# Patient Record
Sex: MALE | Race: WHITE | Employment: OTHER | URBAN - METROPOLITAN AREA
[De-identification: names, ages, dates, MRNs, and addresses within clinical notes are randomized per-mention and may not be internally consistent; named-entity substitution may affect disease eponyms.]

---

## 2017-02-20 ENCOUNTER — OFFICE VISIT (OUTPATIENT)
Dept: FAMILY MEDICINE CLINIC | Age: 67
End: 2017-02-20

## 2017-02-20 VITALS
BODY MASS INDEX: 31.07 KG/M2 | HEIGHT: 72 IN | HEART RATE: 70 BPM | OXYGEN SATURATION: 98 % | DIASTOLIC BLOOD PRESSURE: 76 MMHG | RESPIRATION RATE: 12 BRPM | SYSTOLIC BLOOD PRESSURE: 122 MMHG | WEIGHT: 229.4 LBS

## 2017-02-20 DIAGNOSIS — Z00.00 WELL ADULT HEALTH CHECK: Primary | ICD-10-CM

## 2017-02-20 PROCEDURE — 99396 PREV VISIT EST AGE 40-64: CPT | Performed by: FAMILY MEDICINE

## 2017-02-20 RX ORDER — ALLOPURINOL 300 MG/1
TABLET ORAL
Qty: 90 TABLET | Refills: 3 | Status: SHIPPED | OUTPATIENT
Start: 2017-02-20 | End: 2018-01-22 | Stop reason: SDUPTHER

## 2017-02-20 RX ORDER — IRBESARTAN AND HYDROCHLOROTHIAZIDE 150; 12.5 MG/1; MG/1
1 TABLET, FILM COATED ORAL DAILY
Qty: 90 TABLET | Refills: 3 | Status: SHIPPED | OUTPATIENT
Start: 2017-02-20 | End: 2018-01-24 | Stop reason: SDUPTHER

## 2017-02-20 RX ORDER — METOPROLOL SUCCINATE 25 MG/1
TABLET, EXTENDED RELEASE ORAL
Qty: 90 TABLET | Refills: 3 | Status: SHIPPED | OUTPATIENT
Start: 2017-02-20 | End: 2018-01-22 | Stop reason: SDUPTHER

## 2017-02-20 RX ORDER — IRBESARTAN AND HYDROCHLOROTHIAZIDE 150; 12.5 MG/1; MG/1
1 TABLET, FILM COATED ORAL DAILY
Qty: 90 TABLET | Refills: 3 | Status: CANCELLED | OUTPATIENT
Start: 2017-02-20

## 2017-02-20 ASSESSMENT — ENCOUNTER SYMPTOMS
RESPIRATORY NEGATIVE: 1
GASTROINTESTINAL NEGATIVE: 1

## 2017-06-20 ENCOUNTER — OFFICE VISIT (OUTPATIENT)
Dept: FAMILY MEDICINE CLINIC | Age: 67
End: 2017-06-20

## 2017-06-20 VITALS
OXYGEN SATURATION: 96 % | HEART RATE: 87 BPM | HEIGHT: 72 IN | WEIGHT: 225 LBS | DIASTOLIC BLOOD PRESSURE: 80 MMHG | SYSTOLIC BLOOD PRESSURE: 136 MMHG | RESPIRATION RATE: 14 BRPM | BODY MASS INDEX: 30.48 KG/M2

## 2017-06-20 DIAGNOSIS — M54.12 CERVICAL RADICULITIS: Primary | ICD-10-CM

## 2017-06-20 PROCEDURE — G8417 CALC BMI ABV UP PARAM F/U: HCPCS | Performed by: FAMILY MEDICINE

## 2017-06-20 PROCEDURE — 1036F TOBACCO NON-USER: CPT | Performed by: FAMILY MEDICINE

## 2017-06-20 PROCEDURE — G8427 DOCREV CUR MEDS BY ELIG CLIN: HCPCS | Performed by: FAMILY MEDICINE

## 2017-06-20 PROCEDURE — 3017F COLORECTAL CA SCREEN DOC REV: CPT | Performed by: FAMILY MEDICINE

## 2017-06-20 PROCEDURE — 99213 OFFICE O/P EST LOW 20 MIN: CPT | Performed by: FAMILY MEDICINE

## 2017-06-20 RX ORDER — AMITRIPTYLINE HYDROCHLORIDE 50 MG/1
50 TABLET, FILM COATED ORAL NIGHTLY
Qty: 30 TABLET | Refills: 1 | Status: SHIPPED | OUTPATIENT
Start: 2017-06-20 | End: 2019-03-07

## 2017-06-20 RX ORDER — KETOROLAC TROMETHAMINE 10 MG/1
10 TABLET, FILM COATED ORAL EVERY 6 HOURS PRN
Qty: 20 TABLET | Refills: 0 | Status: SHIPPED | OUTPATIENT
Start: 2017-06-20 | End: 2018-02-01 | Stop reason: ALTCHOICE

## 2017-06-20 ASSESSMENT — PATIENT HEALTH QUESTIONNAIRE - PHQ9
1. LITTLE INTEREST OR PLEASURE IN DOING THINGS: 0
2. FEELING DOWN, DEPRESSED OR HOPELESS: 0
SUM OF ALL RESPONSES TO PHQ QUESTIONS 1-9: 0
SUM OF ALL RESPONSES TO PHQ9 QUESTIONS 1 & 2: 0

## 2017-06-20 ASSESSMENT — ENCOUNTER SYMPTOMS
GASTROINTESTINAL NEGATIVE: 1
RESPIRATORY NEGATIVE: 1

## 2017-12-21 LAB
ALBUMIN SERPL-MCNC: 4 G/DL (ref 3.2–5.3)
ALK PHOSPHATASE: 85 IU/L (ref 35–121)
ALT SERPL-CCNC: 31 IU/L (ref 5–59)
ANION GAP SERPL CALCULATED.3IONS-SCNC: 12 MMOL/L
AST SERPL-CCNC: 27 IU/L (ref 10–42)
BILIRUB SERPL-MCNC: 0.5 MG/DL (ref 0.2–1.3)
BUN BLDV-MCNC: 19 MG/DL (ref 10–20)
CALCIUM SERPL-MCNC: 9.6 MG/DL (ref 8.7–10.8)
CHLORIDE BLD-SCNC: 109 MMOL/L (ref 95–111)
CHOLESTEROL/HDL RATIO: 3.6
CHOLESTEROL: 164 MG/DL
CO2: 25 MMOL/L (ref 21–32)
CREAT SERPL-MCNC: 0.7 MG/DL (ref 0.5–1.3)
EGFR AFRICAN AMERICAN: 137
EGFR IF NONAFRICAN AMERICAN: 113
GLUCOSE: 108 MG/DL (ref 70–100)
HDLC SERPL-MCNC: 46 MG/DL (ref 40–60)
LDL CHOLESTEROL CALCULATED: 80 MG/DL
LDL/HDL RATIO: 1.7
POTASSIUM SERPL-SCNC: 4 MMOL/L (ref 3.5–5.4)
PSA, ULTRASENSITIVE: 0.45 NG/ML
SODIUM BLD-SCNC: 142 MMOL/L (ref 134–147)
TOTAL PROTEIN: 6.5 G/DL (ref 5.8–8)
TRIGL SERPL-MCNC: 192 MG/DL
TSH SERPL DL<=0.05 MIU/L-ACNC: 3.08 UIU/ML (ref 0.4–4.4)
URIC ACID: 4.7 MG/DL (ref 3.8–7.8)
VLDLC SERPL CALC-MCNC: 38 MG/DL

## 2017-12-22 LAB
AVERAGE GLUCOSE: 117 MG/DL (ref 66–114)
HBA1C MFR BLD: 5.7 % (ref 4.2–5.8)

## 2018-01-22 RX ORDER — METOPROLOL SUCCINATE 25 MG/1
TABLET, EXTENDED RELEASE ORAL
Qty: 90 TABLET | Refills: 3 | Status: SHIPPED | OUTPATIENT
Start: 2018-01-22 | End: 2018-02-06 | Stop reason: SDUPTHER

## 2018-01-22 RX ORDER — ALLOPURINOL 300 MG/1
TABLET ORAL
Qty: 90 TABLET | Refills: 3 | Status: SHIPPED | OUTPATIENT
Start: 2018-01-22 | End: 2018-02-06 | Stop reason: SDUPTHER

## 2018-01-24 RX ORDER — IRBESARTAN AND HYDROCHLOROTHIAZIDE 150; 12.5 MG/1; MG/1
1 TABLET, FILM COATED ORAL DAILY
Qty: 90 TABLET | Refills: 3 | Status: SHIPPED | OUTPATIENT
Start: 2018-01-24 | End: 2018-12-20 | Stop reason: SDUPTHER

## 2018-02-01 ENCOUNTER — OFFICE VISIT (OUTPATIENT)
Dept: FAMILY MEDICINE CLINIC | Age: 68
End: 2018-02-01
Payer: MEDICARE

## 2018-02-01 VITALS
WEIGHT: 227.8 LBS | RESPIRATION RATE: 13 BRPM | HEART RATE: 92 BPM | HEIGHT: 72 IN | DIASTOLIC BLOOD PRESSURE: 82 MMHG | BODY MASS INDEX: 30.85 KG/M2 | SYSTOLIC BLOOD PRESSURE: 120 MMHG | OXYGEN SATURATION: 98 %

## 2018-02-01 DIAGNOSIS — Z23 NEED FOR PROPHYLACTIC VACCINATION AGAINST STREPTOCOCCUS PNEUMONIAE (PNEUMOCOCCUS): ICD-10-CM

## 2018-02-01 DIAGNOSIS — Z23 NEED FOR INFLUENZA VACCINATION: ICD-10-CM

## 2018-02-01 DIAGNOSIS — I10 ESSENTIAL HYPERTENSION: Primary | ICD-10-CM

## 2018-02-01 DIAGNOSIS — M17.11 PRIMARY OSTEOARTHRITIS OF RIGHT KNEE: ICD-10-CM

## 2018-02-01 DIAGNOSIS — M10.00 IDIOPATHIC GOUT, UNSPECIFIED CHRONICITY, UNSPECIFIED SITE: ICD-10-CM

## 2018-02-01 DIAGNOSIS — Z00.00 ROUTINE GENERAL MEDICAL EXAMINATION AT A HEALTH CARE FACILITY: ICD-10-CM

## 2018-02-01 PROCEDURE — 3017F COLORECTAL CA SCREEN DOC REV: CPT | Performed by: FAMILY MEDICINE

## 2018-02-01 PROCEDURE — 90670 PCV13 VACCINE IM: CPT | Performed by: FAMILY MEDICINE

## 2018-02-01 PROCEDURE — 99214 OFFICE O/P EST MOD 30 MIN: CPT | Performed by: FAMILY MEDICINE

## 2018-02-01 PROCEDURE — 1036F TOBACCO NON-USER: CPT | Performed by: FAMILY MEDICINE

## 2018-02-01 PROCEDURE — 4040F PNEUMOC VAC/ADMIN/RCVD: CPT | Performed by: FAMILY MEDICINE

## 2018-02-01 PROCEDURE — G8484 FLU IMMUNIZE NO ADMIN: HCPCS | Performed by: FAMILY MEDICINE

## 2018-02-01 PROCEDURE — G0009 ADMIN PNEUMOCOCCAL VACCINE: HCPCS | Performed by: FAMILY MEDICINE

## 2018-02-01 PROCEDURE — G0008 ADMIN INFLUENZA VIRUS VAC: HCPCS | Performed by: FAMILY MEDICINE

## 2018-02-01 PROCEDURE — G8417 CALC BMI ABV UP PARAM F/U: HCPCS | Performed by: FAMILY MEDICINE

## 2018-02-01 PROCEDURE — G8427 DOCREV CUR MEDS BY ELIG CLIN: HCPCS | Performed by: FAMILY MEDICINE

## 2018-02-01 PROCEDURE — 1123F ACP DISCUSS/DSCN MKR DOCD: CPT | Performed by: FAMILY MEDICINE

## 2018-02-01 PROCEDURE — 90662 IIV NO PRSV INCREASED AG IM: CPT | Performed by: FAMILY MEDICINE

## 2018-02-01 PROCEDURE — G0402 INITIAL PREVENTIVE EXAM: HCPCS | Performed by: FAMILY MEDICINE

## 2018-02-01 ASSESSMENT — ENCOUNTER SYMPTOMS
GASTROINTESTINAL NEGATIVE: 1
RESPIRATORY NEGATIVE: 1

## 2018-02-01 ASSESSMENT — PATIENT HEALTH QUESTIONNAIRE - PHQ9: SUM OF ALL RESPONSES TO PHQ QUESTIONS 1-9: 0

## 2018-02-01 ASSESSMENT — LIFESTYLE VARIABLES: HOW OFTEN DO YOU HAVE A DRINK CONTAINING ALCOHOL: 0

## 2018-02-01 ASSESSMENT — ANXIETY QUESTIONNAIRES: GAD7 TOTAL SCORE: 0

## 2018-02-01 NOTE — PATIENT INSTRUCTIONS
Personalized Preventive Plan for Chris Holman - 2/1/2018  Medicare offers a range of preventive health benefits. Some of the tests and screenings are paid in full while other may be subject to a deductible, co-insurance, and/or copay. Some of these benefits include a comprehensive review of your medical history including lifestyle, illnesses that may run in your family, and various assessments and screenings as appropriate. After reviewing your medical record and screening and assessments performed today your provider may have ordered immunizations, labs, imaging, and/or referrals for you. A list of these orders (if applicable) as well as your Preventive Care list are included within your After Visit Summary for your review. Other Preventive Recommendations:    · A preventive eye exam performed by an eye specialist is recommended every 1-2 years to screen for glaucoma; cataracts, macular degeneration, and other eye disorders. · A preventive dental visit is recommended every 6 months. · Try to get at least 150 minutes of exercise per week or 10,000 steps per day on a pedometer . · Order or download the FREE \"Exercise & Physical Activity: Your Everyday Guide\" from [employer]. Call 8-801.822.3905 or search [employer] online. · You need 8941-7616 mg of calcium and 1959-1521 IU of vitamin D per day. It is possible to meet your calcium requirement with diet alone, but a vitamin D supplement is usually necessary to meet this goal.  · When exposed to the sun, use a sunscreen that protects against both UVA and UVB radiation with an SPF of 30 or greater. Reapply every 2 to 3 hours or after sweating, drying off with a towel, or swimming. · Always wear a seat belt when traveling in a car. Always wear a helmet when riding a bicycle or motorcycle.

## 2018-02-06 RX ORDER — METOPROLOL SUCCINATE 25 MG/1
TABLET, EXTENDED RELEASE ORAL
Qty: 90 TABLET | Refills: 3 | Status: SHIPPED | OUTPATIENT
Start: 2018-02-06 | End: 2019-02-14 | Stop reason: SDUPTHER

## 2018-02-06 RX ORDER — ALLOPURINOL 300 MG/1
TABLET ORAL
Qty: 90 TABLET | Refills: 3 | Status: SHIPPED | OUTPATIENT
Start: 2018-02-06 | End: 2019-02-14 | Stop reason: SDUPTHER

## 2018-02-23 ENCOUNTER — HOSPITAL ENCOUNTER (OUTPATIENT)
Dept: PHYSICAL THERAPY | Age: 68
Setting detail: THERAPIES SERIES
Discharge: HOME | End: 2018-02-23
Payer: MEDICARE

## 2018-02-23 PROCEDURE — 97110 THERAPEUTIC EXERCISES: CPT

## 2018-02-23 PROCEDURE — G8979 MOBILITY GOAL STATUS: HCPCS

## 2018-02-23 PROCEDURE — 97161 PT EVAL LOW COMPLEX 20 MIN: CPT

## 2018-02-23 PROCEDURE — G8978 MOBILITY CURRENT STATUS: HCPCS

## 2018-02-23 ASSESSMENT — PAIN SCALES - GENERAL: PAINLEVEL_OUTOF10: 2

## 2018-02-23 ASSESSMENT — PAIN DESCRIPTION - ORIENTATION: ORIENTATION: RIGHT

## 2018-02-23 ASSESSMENT — PAIN DESCRIPTION - LOCATION: LOCATION: KNEE

## 2018-02-23 ASSESSMENT — PAIN DESCRIPTION - PAIN TYPE: TYPE: CHRONIC PAIN;SURGICAL PAIN

## 2018-02-23 NOTE — PROGRESS NOTES
I certify that I have examined the patient below and determined that Physical Medicine and Rehabilitation service is necessary; that the secondary diagnosis for the provision of rehabilitation services is consistent with identified needs; that service will be furnished on an outpatient basis while the patient is in my care; that I approve the above plan of care for up to 90 days or as specifically noted above and will review it within that time frame or more often if the patients condition requires. Attestation, signature or co-signature of physician indicates approval of certification requirements.    ________________________ ____________ __________  Physician Signature   Date   Time       [address]     Time In: 1000  Time Out: 1055  Minutes: 55  Timed Code Treatment Minutes: 23 Minutes     Date: 2018  Patient Name: Nash Jolly,  Gender:  male        CSN: [id number]   : 1950  (74 y.o.)        Referring Practitioner: Cyndi Woody MD      Diagnosis: right s/p right total knee arthroplasty  Treatment Diagnosis: s/p right knee arthroplasty with difficulty walking, limited knee ROM/strength and knee pain  Additional Pertinent Hx: comorbidities: s/p right total knee arthroplasty 18, HTN       General:  PT Visit Information  Onset Date: 18  PT Insurance Information: Medicare $3700 cap per calendar year for PT and ST. $3700 cap per calendar year for OT.  aquatic therapy covered. modalties covered. iontophoresis not covered. HP/CP are not covered. Total # of Visits to Date: 1  Plan of Care/Certification Expiration Date: 18  Progress Note Counter: 1/10 for progress note                         See Medical History Questionnaire for information related to allergies and medications. Subjective:  Chart Reviewed: Yes  Comments: Patient follows up on 3/7/18 for staples to be removed.     Other Patient Tolerated treatment well    Assessment: Body structures, Functions, Activity limitations: Decreased functional mobility , Decreased ADL status, Decreased ROM, Decreased strength, Decreased balance  Assessment: 74year old male with s/p Right TKR on 2/21/18. Patient having minimal pain at right knee, moderate restriction with right knee ROM, decreased strength and gait deviations. Patient would benefit from PT 2-3x per week for up to 6 weeks. Prognosis: Good  Discharge Recommendations: Continue to assess pending progress    Patient Education:  Patient Education: Patient educated in plan of care. Issued home ex program to follow per ex in patient care record section. Instructed to continue to ice and elevate. Plan:  Times per week: 2-3x   Plan weeks: 6 weeks  Specific instructions for Next Treatment: Post total knee rehab for knee ROM, strengthening right hip and knee m., gait training, balance. Nustep, mat exercises with progression to standing 3 way hip open and closed chain, knee extension concentration for ROM and decreased quad lag,  balance. step negotiation. Current Treatment Recommendations: Strengthening, ROM, Balance Training, Transfer Training, Gait Training, Safety Education & Training, Home Exercise Program, Stair training    History: Personal factors or comorbidities that impact plan of care -  Moderate Complexity: 1-2 personal factors or comorbidities. See history section above for details. Examination: Body structures and functions, activity limitations, participation restrictions; using standardized tests and measures - Moderate Complexity: 3 or morebody structures and functional, activity limitations and/or participation restrictions. See restrictions and objective section above for details. Clinical Presentation: Low - Stable and Uncomplicated: Patient is s/p total knee Right, with limited knee ROM, strength, gait deviations and balance.      Decision Making:

## 2018-02-26 ENCOUNTER — HOSPITAL ENCOUNTER (OUTPATIENT)
Dept: PHYSICAL THERAPY | Age: 68
Setting detail: THERAPIES SERIES
Discharge: HOME | End: 2018-02-26
Payer: MEDICARE

## 2018-02-26 PROCEDURE — 97110 THERAPEUTIC EXERCISES: CPT

## 2018-02-26 ASSESSMENT — PAIN SCALES - GENERAL: PAINLEVEL_OUTOF10: 2

## 2018-02-26 ASSESSMENT — PAIN DESCRIPTION - PAIN TYPE: TYPE: CHRONIC PAIN;SURGICAL PAIN

## 2018-02-26 ASSESSMENT — PAIN DESCRIPTION - LOCATION: LOCATION: KNEE

## 2018-02-26 ASSESSMENT — PAIN DESCRIPTION - ORIENTATION: ORIENTATION: RIGHT

## 2018-02-26 NOTE — PROGRESS NOTES
Training, Transfer Training, Gait Training, Safety Education & Training, Home Exercise Program, Stair training    Goals:  Patient goals : Get full function at right knee back. Short term goals  Time Frame for Short term goals: 3 weeks  Short term goal 1: Patient to demonstrate decreased swelling at right knee from 46cm to 43 cm with increased ease with ROM and decreased knee pain. Short term goal 2: Patient to demonstrate increased right knee ROM from 20 degrees to 97 degrees flexion to 10 degrees to 110 degrees knee flexion with increased ease with transfers, improved walking pattern and negotiating steps. Short term goal 3: Patient to demonstrate increased strength right knee and hip m with hip strength at flexors from 3+/5 to 4-/5, no right quad lag with strength 3+/5, with increased stability with walking and standing. Short term goal 4: Patient to demonstrate ambulation with straight cane with equal step length, improved wt shift through RLE and knee extension at stance phase. Short term goal 5: Patient to demonstrate LE functional scale from 28/80 to at least 35/80 with less than 60% deficit. Long term goals  Time Frame for Long term goals : 6 weeks  Long term goal 1: Patient to demonstrate right knee ROM from  degrees flexion to 0 degrees to 120 degrees flexion with increased ease with transfers, ambulation and negotiating steps. Long term goal 2: Patient to demonstrate RLE hip and knee strength from 3+/5 to 4/5 at hip adn knee musculature with stability with walking and negotiating steps. Long term goal 3: Patient to ambulate without assistive aid with no gait deviations with equal wt shift, step length for household and community distances. Long term goal 4: LE Functional Scale no more than 40% deficit. Long term goal 5: Patient independent in home ex program in order to meet above goals.           Donavan Gil, [id number]

## 2018-02-28 ENCOUNTER — HOSPITAL ENCOUNTER (OUTPATIENT)
Dept: PHYSICAL THERAPY | Age: 68
Setting detail: THERAPIES SERIES
Discharge: HOME | End: 2018-02-28
Payer: MEDICARE

## 2018-02-28 PROCEDURE — 97110 THERAPEUTIC EXERCISES: CPT

## 2018-02-28 ASSESSMENT — PAIN DESCRIPTION - ORIENTATION: ORIENTATION: RIGHT

## 2018-02-28 ASSESSMENT — PAIN DESCRIPTION - LOCATION: LOCATION: KNEE

## 2018-02-28 ASSESSMENT — PAIN SCALES - GENERAL: PAINLEVEL_OUTOF10: 2

## 2018-02-28 ASSESSMENT — PAIN DESCRIPTION - PAIN TYPE: TYPE: SURGICAL PAIN

## 2018-02-28 NOTE — PROGRESS NOTES
[place]     Time In: 0845  Time Out: 9861  Minutes: 41  Timed Code Treatment Minutes: 41 Minutes     Date: 2018  Patient Name: Tessy Jain,  Gender:  male        CSN: [id number]   : 1950  (74 y.o.)       Referring Practitioner: Jackelin Ordaz MD      Diagnosis: right s/p right total knee arthroplasty  Treatment Diagnosis: s/p right knee arthroplasty with difficulty walking, limited knee ROM/strength and knee pain   Additional Pertinent Hx: comorbidities: s/p right total knee arthroplasty 18, HTN    General:  PT Visit Information  Onset Date: 18  PT Insurance Information: Medicare $3700 cap per calendar year for PT and ST. $3700 cap per calendar year for OT.  aquatic therapy covered. modalties covered. iontophoresis not covered. HP/CP are not covered. Total # of Visits to Date: 3  Progress Note Counter: 3/10 for PN. Subjective:  Chart Reviewed: Yes  Family / Caregiver Present: Yes  Comments: Patient follows up on 3/7/18 for staples to be removed. Other (Comment): PT 3x per week for 4 weeks     Subjective: Patient states he took perocet before therapy, \" it seems to help. \"     Pain:  Patient Currently in Pain: Yes  Pain Assessment: 0-10  Pain Level: 2  Pain Type: Surgical pain  Pain Location: Knee  Pain Orientation: Right  Objective     Exercises  Exercise 1: Airdyne seat 5 x 5 minutes rocking back/forth   Exercise 2: Stretches at step: knee flexion, hamstring, calf stretch 10-15 x 3 on R. Exercise 4: heel slides x 15, quad sets on bolster 15 x 5 second hold, SAQ x 15 hold for 5 seconds   Exercise 7: knee ROM -12 degrees to 103 degrees flexion at end of session following heel slides after stretches and distraction. Exercise 8: Prone hang x 2 minutes -adding opposite foot crossed over R foot for added over pressure to help with extension.   Exercise 9: heel raises, marching,

## 2018-03-01 ENCOUNTER — HOSPITAL ENCOUNTER (OUTPATIENT)
Age: 68
Discharge: HOME | End: 2018-03-01
Payer: MEDICARE

## 2018-03-01 LAB
HCT VFR BLD CALC: 39.9 % (ref 42–52)
HEMOGLOBIN: 13 GM/DL (ref 14–18)
MCH RBC QN AUTO: 30.8 PG (ref 27–31)
MCHC RBC AUTO-ENTMCNC: 32.5 GM/DL (ref 33–37)
MCV RBC AUTO: 94.6 FL (ref 80–94)
PDW BLD-RTO: 15.1 % (ref 11.5–14.5)
PLATELET # BLD: 342 THOU/MM3 (ref 130–400)
PMV BLD AUTO: 8.8 FL (ref 7.4–10.4)
RBC # BLD: 4.22 MILL/MM3 (ref 4.7–6.1)
WBC # BLD: 8.5 THOU/MM3 (ref 4.8–10.8)

## 2018-03-01 PROCEDURE — 36415 COLL VENOUS BLD VENIPUNCTURE: CPT

## 2018-03-01 PROCEDURE — 85027 COMPLETE CBC AUTOMATED: CPT

## 2018-03-02 ENCOUNTER — HOSPITAL ENCOUNTER (OUTPATIENT)
Dept: PHYSICAL THERAPY | Age: 68
Setting detail: THERAPIES SERIES
Discharge: HOME | End: 2018-03-02
Payer: MEDICARE

## 2018-03-02 PROCEDURE — 97110 THERAPEUTIC EXERCISES: CPT

## 2018-03-02 ASSESSMENT — PAIN SCALES - GENERAL: PAINLEVEL_OUTOF10: 2

## 2018-03-02 NOTE — PROGRESS NOTES
knee flexion with increased ease with transfers, improved walking pattern and negotiating steps. Short term goal 3: Patient to demonstrate increased strength right knee and hip m with hip strength at flexors from 3+/5 to 4-/5, no right quad lag with strength 3+/5, with increased stability with walking and standing. Short term goal 4: Patient to demonstrate ambulation with straight cane with equal step length, improved wt shift through RLE and knee extension at stance phase. Short term goal 5: Patient to demonstrate LE functional scale from 28/80 to at least 35/80 with less than 60% deficit. Long term goals  Time Frame for Long term goals : 6 weeks  Long term goal 1: Patient to demonstrate right knee ROM from  degrees flexion to 0 degrees to 120 degrees flexion with increased ease with transfers, ambulation and negotiating steps. Long term goal 2: Patient to demonstrate RLE hip and knee strength from 3+/5 to 4/5 at hip adn knee musculature with stability with walking and negotiating steps. Long term goal 3: Patient to ambulate without assistive aid with no gait deviations with equal wt shift, step length for household and community distances. Long term goal 4: LE Functional Scale no more than 40% deficit. Long term goal 5: Patient independent in home ex program in order to meet above goals. Chon Garcia.  Emanuel Blackwood # [id number]

## 2018-03-05 ENCOUNTER — HOSPITAL ENCOUNTER (OUTPATIENT)
Dept: PHYSICAL THERAPY | Age: 68
Setting detail: THERAPIES SERIES
Discharge: HOME | End: 2018-03-05
Payer: MEDICARE

## 2018-03-05 PROCEDURE — 97110 THERAPEUTIC EXERCISES: CPT

## 2018-03-05 ASSESSMENT — PAIN DESCRIPTION - PAIN TYPE: TYPE: SURGICAL PAIN

## 2018-03-05 ASSESSMENT — PAIN DESCRIPTION - LOCATION: LOCATION: KNEE

## 2018-03-05 ASSESSMENT — PAIN DESCRIPTION - ORIENTATION: ORIENTATION: RIGHT

## 2018-03-05 ASSESSMENT — PAIN SCALES - GENERAL: PAINLEVEL_OUTOF10: 1

## 2018-03-05 NOTE — PROGRESS NOTES
quad lag with strength 3+/5, with increased stability with walking and standing. Short term goal 4: Patient to demonstrate ambulation with straight cane with equal step length, improved wt shift through RLE and knee extension at stance phase. Short term goal 5: Patient to demonstrate LE functional scale from 28/80 to at least 35/80 with less than 60% deficit. Long term goals  Time Frame for Long term goals : 6 weeks  Long term goal 1: Patient to demonstrate right knee ROM from  degrees flexion to 0 degrees to 120 degrees flexion with increased ease with transfers, ambulation and negotiating steps. Long term goal 2: Patient to demonstrate RLE hip and knee strength from 3+/5 to 4/5 at hip adn knee musculature with stability with walking and negotiating steps. Long term goal 3: Patient to ambulate without assistive aid with no gait deviations with equal wt shift, step length for household and community distances. Long term goal 4: LE Functional Scale no more than 40% deficit. Long term goal 5: Patient independent in home ex program in order to meet above goals.           Andrea Medina, [id number]

## 2018-03-06 ENCOUNTER — HOSPITAL ENCOUNTER (OUTPATIENT)
Dept: PHYSICAL THERAPY | Age: 68
Setting detail: THERAPIES SERIES
Discharge: HOME | End: 2018-03-06
Payer: MEDICARE

## 2018-03-06 PROCEDURE — 97110 THERAPEUTIC EXERCISES: CPT

## 2018-03-06 ASSESSMENT — PAIN DESCRIPTION - LOCATION: LOCATION: KNEE

## 2018-03-06 ASSESSMENT — PAIN DESCRIPTION - ORIENTATION: ORIENTATION: RIGHT

## 2018-03-06 ASSESSMENT — PAIN SCALES - GENERAL: PAINLEVEL_OUTOF10: 2

## 2018-03-06 NOTE — PROGRESS NOTES
from 20 degrees to 97 degrees flexion to 10 degrees to 110 degrees knee flexion with increased ease with transfers, improved walking pattern and negotiating steps. Short term goal 3: Patient to demonstrate increased strength right knee and hip m with hip strength at flexors from 3+/5 to 4-/5, no right quad lag with strength 3+/5, with increased stability with walking and standing. Short term goal 4: Patient to demonstrate ambulation with straight cane with equal step length, improved wt shift through RLE and knee extension at stance phase. Short term goal 5: Patient to demonstrate LE functional scale from 28/80 to at least 35/80 with less than 60% deficit. Long term goals  Time Frame for Long term goals : 6 weeks  Long term goal 1: Patient to demonstrate right knee ROM from  degrees flexion to 0 degrees to 120 degrees flexion with increased ease with transfers, ambulation and negotiating steps. Long term goal 2: Patient to demonstrate RLE hip and knee strength from 3+/5 to 4/5 at hip adn knee musculature with stability with walking and negotiating steps. Long term goal 3: Patient to ambulate without assistive aid with no gait deviations with equal wt shift, step length for household and community distances. Long term goal 4: LE Functional Scale no more than 40% deficit. Long term goal 5: Patient independent in home ex program in order to meet above goals. Mundo Gallegos.  [place] # [id number]

## 2018-03-09 ENCOUNTER — HOSPITAL ENCOUNTER (OUTPATIENT)
Dept: PHYSICAL THERAPY | Age: 68
Setting detail: THERAPIES SERIES
Discharge: HOME | End: 2018-03-09
Payer: MEDICARE

## 2018-03-09 PROCEDURE — 97110 THERAPEUTIC EXERCISES: CPT

## 2018-03-09 ASSESSMENT — PAIN DESCRIPTION - PAIN TYPE: TYPE: SURGICAL PAIN

## 2018-03-09 ASSESSMENT — PAIN SCALES - GENERAL: PAINLEVEL_OUTOF10: 2

## 2018-03-09 ASSESSMENT — PAIN DESCRIPTION - LOCATION: LOCATION: KNEE

## 2018-03-09 ASSESSMENT — PAIN DESCRIPTION - ORIENTATION: ORIENTATION: RIGHT

## 2018-03-09 NOTE — PROGRESS NOTES
[address] PHYSICAL THERAPY  DAILY NOTE  [facility].     Time In: 7631  Time Out: 1440  Minutes: 43  Timed Code Treatment Minutes: 43 Minutes     Date: 3/9/2018  Patient Name: Elaine Bhakta,  Gender:  male        CSN: [id number]   : 1950  (74 y.o.)       Referring Practitioner: Arley Carter MD      Diagnosis: right s/p right total knee arthroplasty   Additional Pertinent Hx: comorbidities: s/p right total knee arthroplasty 18, HTN                  General:  PT Visit Information  Onset Date: 18  PT Insurance Information: Medicare $3700 cap per calendar year for PT and ST. $3700 cap per calendar year for OT.  aquatic therapy covered. modalties covered. iontophoresis not covered. HP/CP are not covered. Total # of Visits to Date: 7  Plan of Care/Certification Expiration Date: 18  Progress Note Counter: 7/10 for PN. Subjective:  Family / Caregiver Present: No  Comments: f/u with MD on .  6/10 for PN. cert due 86  Other (Comment): PT 3x per week for 4 weeks     Subjective: Pt reporting that knee pain is 2/10 today and reporting good compliance with HEP. Pain:  Patient Currently in Pain: Yes  Pain Assessment: 0-10  Pain Level: 2  Pain Type: Surgical pain  Pain Location: Knee  Pain Orientation: Right      Objective  Exercises  Exercise 1: Airdyne seat 5 x 5 minutes full forward revolution.    Exercise 2: heel slide with strap 30 x  Exercise 3: quad set ankle on bolster 30 x 5 seconds (AROM R knee 7- 110 degrees)  Exercise 4: SLR 20x 5  seconds  Exercise 5: prone hang with over pressure from L 5 minutes  Exercise 6: step up forward and lateral 4 inch R 20 x - cues for knee flexion during   Exercise 8: step flexion, extension, calf 10 x 5 seconds  Exercise 9: standing heel raises, mini-squat 20 x  Exercise 10: Rocker Board 2 directions x 20 ; balance 30 seconds each direction with min UE support  Exercise 11: total gym

## 2018-03-12 ENCOUNTER — HOSPITAL ENCOUNTER (OUTPATIENT)
Dept: PHYSICAL THERAPY | Age: 68
Setting detail: THERAPIES SERIES
Discharge: HOME | End: 2018-03-12
Payer: MEDICARE

## 2018-03-12 PROCEDURE — G8979 MOBILITY GOAL STATUS: HCPCS

## 2018-03-12 PROCEDURE — 97110 THERAPEUTIC EXERCISES: CPT

## 2018-03-12 PROCEDURE — G8978 MOBILITY CURRENT STATUS: HCPCS

## 2018-03-12 NOTE — PROGRESS NOTES
extension, 0 x 10 seconds  Exercise 9: standing heel raises, mini-squat 20 x  Exercise 10: Rocker Board 2 directions x 20 ; balance 30 seconds each direction with min UE support  Exercise 11: total gym level 8 B squat 30x  Exercise 13:    Exercise 14: Activity Tolerance:  Activity Tolerance: Patient Tolerated treatment well    Assessment: Body structures, Functions, Activity limitations: Decreased functional mobility , Decreased strength, Decreased endurance, Decreased balance, Decreased ADL status  Assessment: patient is progressing well.  extension AROM was very tight prior to TKR , is having slow but steady progress  Prognosis: Good  REQUIRES PT FOLLOW UP: Yes  Discharge Recommendations: Continue to assess pending progress    Patient Education:  Patient Education: Continue with HEP and ice. Keep cane with pt and working on heel strike. Barriers to Learning: none                      Plan:  Times per week: 2-3x   Plan weeks: 4 weeks  Specific instructions for Next Treatment: Post total knee rehab for knee ROM, strengthening right hip and knee m., gait training, balance. Nustep, mat exercises with progression to standing 3 way hip open and closed chain, knee extension concentration for ROM and decreased quad lag,  balance. step negotiation. Current Treatment Recommendations: Strengthening, ROM, Balance Training, Transfer Training, Gait Training, Safety Education & Training, Home Exercise Program, Stair training    Goals:  Patient goals : Get full function at right knee back. Short term goals  Time Frame for Short term goals: 3 weeks  Short term goal 1: Patient to demonstrate decreased swelling at right knee from 46cm to 43 cm with increased ease with ROM and decreased knee pain.    MET- R KNEE JOINT LINE 43 CM  Short term goal 2: Patient to demonstrate increased right knee ROM from 20 degrees to 97 degrees flexion to 10 degrees to 110 degrees knee flexion with increased ease with transfers,

## 2018-03-14 ENCOUNTER — HOSPITAL ENCOUNTER (OUTPATIENT)
Dept: PHYSICAL THERAPY | Age: 68
Setting detail: THERAPIES SERIES
Discharge: HOME | End: 2018-03-14
Payer: MEDICARE

## 2018-03-14 PROCEDURE — 97110 THERAPEUTIC EXERCISES: CPT

## 2018-03-14 NOTE — PROGRESS NOTES
flexion to 0 degrees to 120 degrees flexion with increased ease with transfers, ambulation and negotiating steps. Long term goal 2: Patient to demonstrate RLE hip and knee strength from 3+/5 to 4/5 at hip adn knee musculature with stability with walking and negotiating steps. Long term goal 3: Patient to ambulate without assistive aid with no gait deviations with equal wt shift, step length for household and community distances. Long term goal 4: Patient independent in home ex program in order to meet above goals. Deepak Louis.  Arlet Woody # [id number]

## 2018-03-15 ENCOUNTER — HOSPITAL ENCOUNTER (OUTPATIENT)
Dept: PHYSICAL THERAPY | Age: 68
Setting detail: THERAPIES SERIES
Discharge: HOME | End: 2018-03-15
Payer: MEDICARE

## 2018-03-15 PROCEDURE — 97110 THERAPEUTIC EXERCISES: CPT

## 2018-03-15 NOTE — PROGRESS NOTES
independent in home ex program in order to meet above goals. Long term goal 5: Patient independent in home ex program in order to meet above goals.   [address], [id number]

## 2018-03-20 ENCOUNTER — HOSPITAL ENCOUNTER (OUTPATIENT)
Dept: PHYSICAL THERAPY | Age: 68
Setting detail: THERAPIES SERIES
Discharge: HOME | End: 2018-03-20
Payer: MEDICARE

## 2018-03-20 PROCEDURE — 97110 THERAPEUTIC EXERCISES: CPT

## 2018-03-20 NOTE — PROGRESS NOTES
lateral 4 inch R 30 x -  Exercise 8: step flexion, extension, 10 x 10 seconds  Exercise 9: standing heel raises, mini-squat 30 x  Exercise 10: Rocker Board 2 directions x 30 ; balance 30 seconds each direction with min UE support  Exercise 11: total gym level 8 B squat 30x         Activity Tolerance:  Activity Tolerance: Patient Tolerated treatment well    Assessment: Body structures, Functions, Activity limitations: Decreased functional mobility , Decreased strength, Decreased endurance, Decreased balance, Decreased ADL status  Assessment: PT added PROM for R knee extension to continue to work on extension AROM which was down to 4 degrees after quad set and prone  Prognosis: Good  REQUIRES PT FOLLOW UP: Yes    Patient Education:  Patient Education: Continue with HEP and importance of correct technique with. Heel strike as needed. Barriers to Learning: none                      Plan:  Times per week: 2-3x   Plan weeks: 4 weeks  Specific instructions for Next Treatment: Post total knee rehab for knee ROM, strengthening right hip and knee m., gait training, balance. Nustep, mat exercises with progression to standing 3 way hip open and closed chain, knee extension concentration for ROM and decreased quad lag,  balance. step negotiation. Current Treatment Recommendations: Strengthening, ROM, Balance Training, Transfer Training, Gait Training, Safety Education & Training, Home Exercise Program, Stair training  Plan Comment: Try to lower seat height on bike on 3/21    Goals:  Patient goals : Get full function at right knee back. Short term goals  Time Frame for Short term goals: deferred to LTG's    Long term goals  Time Frame for Long term goals : 4 weeks  Long term goal 1: Patient to demonstrate right knee ROM from  degrees flexion to 0 degrees to 120 degrees flexion with increased ease with transfers, ambulation and negotiating steps.    Long term goal 2: Patient to demonstrate RLE hip and knee strength

## 2018-03-21 ENCOUNTER — HOSPITAL ENCOUNTER (OUTPATIENT)
Dept: PHYSICAL THERAPY | Age: 68
Setting detail: THERAPIES SERIES
Discharge: HOME | End: 2018-03-21
Payer: MEDICARE

## 2018-03-21 PROCEDURE — 97110 THERAPEUTIC EXERCISES: CPT

## 2018-03-21 NOTE — PROGRESS NOTES
[facility] 62  OUTPATIENT PHYSICAL THERAPY  DAILY NOTE  [facility].     Time In: 3146  Time Out: [address]  Minutes: 29  Timed Code Treatment Minutes: 29 Minutes     Date: 3/21/2018  Patient Name: Jessica Nicolas,  Gender:  male        CSN: [id number]   : 1950  (74 y.o.)       Referring Practitioner: Lisa Avina MD      Diagnosis: right s/p right total knee arthroplasty  Treatment Diagnosis: s/p right knee arthroplasty with difficulty walking, limited knee ROM/strength and knee pain   Additional Pertinent Hx: comorbidities: s/p right total knee arthroplasty 18, HTN                  General:  PT Visit Information  Onset Date: 18  PT Insurance Information: Medicare $3700 cap per calendar year for PT and ST. $3700 cap per calendar year for OT.  aquatic therapy covered. modalties covered. iontophoresis not covered. HP/CP are not covered. Total # of Visits to Date: 6  Plan of Care/Certification Expiration Date: 18  Progress Note Counter: 3/10 for PN. Subjective:  Family / Caregiver Present: No  Comments: f/u with MD on .  6/10 for PN. cert due 52  Other (Comment): PT 3x per week for 4 weeks     Subjective: Pt reporting not having any knee pain today. Did do HEP yesterday and was having more pain at night but took a tylenol and that took his pain away. Pain:  Patient Currently in Pain: Denies         Objective  Exercises  Exercise 1: Airdyne seat 4 x 5 minutes full forward revolution.    Exercise 2: heel slide with strap 30 x  Exercise 3: quad set ankle on bolster 30 x 5 seconds (AROM R knee 4- 115 degrees)  Exercise 4: SLR 2 x 15 x 5  seconds  Exercise 5: PROM by PT knee extension with ankle on bolster 10 x 5 seconds-needing break in middle  Exercise 7: step up forward and lateral 4 inch R 30 x -  Exercise 8: step flexion, extension, 10 x 10 seconds  Exercise 11: total gym level 8 B squat 30x         Activity Tolerance:  Activity

## 2018-03-22 ENCOUNTER — HOSPITAL ENCOUNTER (OUTPATIENT)
Dept: OCCUPATIONAL THERAPY | Age: 68
Setting detail: THERAPIES SERIES
Discharge: HOME | End: 2018-03-22
Payer: MEDICARE

## 2018-03-22 PROCEDURE — G8985 CARRY GOAL STATUS: HCPCS

## 2018-03-22 PROCEDURE — G8984 CARRY CURRENT STATUS: HCPCS

## 2018-03-22 PROCEDURE — 97110 THERAPEUTIC EXERCISES: CPT

## 2018-03-22 PROCEDURE — 97165 OT EVAL LOW COMPLEX 30 MIN: CPT

## 2018-03-23 ENCOUNTER — HOSPITAL ENCOUNTER (OUTPATIENT)
Dept: PHYSICAL THERAPY | Age: 68
Setting detail: THERAPIES SERIES
Discharge: HOME | End: 2018-03-23
Payer: MEDICARE

## 2018-03-23 PROCEDURE — 97110 THERAPEUTIC EXERCISES: CPT

## 2018-03-23 NOTE — PROGRESS NOTES
[facility] PHYSICAL THERAPY  DAILY NOTE  [address] [facility].     Time In: 5298  Time Out: 8831  Minutes: 40  Timed Code Treatment Minutes: 40 Minutes     Date: 3/23/2018  Patient Name: Parris Collins,  Gender:  male        CSN: [id number]   : 1950  (74 y.o.)       Referring Practitioner: Rach Null MD      Diagnosis: right s/p right total knee arthroplasty  Treatment Diagnosis: s/p right knee arthroplasty with difficulty walking, limited knee ROM/strength and knee pain   Additional Pertinent Hx: comorbidities: s/p right total knee arthroplasty 18, HTN                  General:  PT Visit Information  Onset Date: 18  PT Insurance Information: Medicare $3700 cap per calendar year for PT and ST. $3700 cap per calendar year for OT.  aquatic therapy covered. modalties covered. iontophoresis not covered. HP/CP are not covered. Total # of Visits to Date: 15  Plan of Care/Certification Expiration Date: 18  Progress Note Due Date: 18  Progress Note Counter: 4/10 for PN. Subjective:  Family / Caregiver Present: No  Comments: f/u with MD on .  6/10 for PN. 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    Exercise 2: heel slide with strap 30 x  Exercise 3: quad set ankle on bolster 30 x 5 seconds (AROM R knee 4- 118 degrees)  Exercise 4: SLR 2 x 15 x 5  seconds  Exercise 5: Joint mobilization, AP mobs grade II- III  patient supine x 15 reps, PROM by PT knee extension with ankle on bolster 10 x 5 seconds-needing break in middle  Exercise 6: prone hang with 3 # ankle weight on x 4 minutes  Exercise 7: step up forward and lateral 6 inch R 10 x -  Exercise 8: step flexion, extension, 15 x 10 seconds  Exercise 10: Rocker Board 2 directions x 30 ; balance 30 seconds each direction with min UE support  Exercise 11: total gym level 8 B squat 30x         Activity Tolerance:  Activity Tolerance: Patient Tolerated treatment well    Assessment: Body structures, Functions, Activity limitations: Decreased functional mobility , Decreased strength, Decreased endurance, Decreased balance, Decreased ADL status  Assessment: extension AROM improved to 3 degrees after extension stretching. increased time with increased reps  Prognosis: Good  REQUIRES PT FOLLOW UP: Yes    Patient Education:  Patient Education: Continue with HEP and working on range of motion. Barriers to Learning: none                      Plan:  Times per week: 2-3x   Plan weeks: 4 weeks  Specific instructions for Next Treatment: Post total knee rehab for knee ROM, strengthening right hip and knee m., gait training, balance. Nustep, mat exercises with progression to standing 3 way hip open and closed chain, knee extension concentration for ROM and decreased quad lag,  balance. step negotiation. Current Treatment Recommendations: Strengthening, ROM, Balance Training, Transfer Training, Gait Training, Safety Education & Training, Home Exercise Program, Stair training  Plan Comment:      Goals:  Patient goals : Get full function at right knee back. Short term goals  Time Frame for Short term goals: deferred to LTG's    Long term goals  Time Frame for Long term goals : 4 weeks  Long term goal 1: Patient to demonstrate right knee ROM from  degrees flexion to 0 degrees to 120 degrees flexion with increased ease with transfers, ambulation and negotiating steps. Long term goal 2: Patient to demonstrate RLE hip and knee strength from 3+/5 to 4/5 at hip adn knee musculature with stability with walking and negotiating steps.    Long term goal 3: Patient to ambulate without assistive aid with no gait deviations with equal wt shift, step length for household and community distances. Long term goal 4: Patient independent in home ex program in order to meet above goals. Long term goal 5: Patient independent in home ex program in order to meet above goals. Iqra Stack # [id number]

## 2018-03-26 ENCOUNTER — HOSPITAL ENCOUNTER (OUTPATIENT)
Dept: OCCUPATIONAL THERAPY | Age: 68
Setting detail: THERAPIES SERIES
Discharge: HOME | End: 2018-03-26
Payer: MEDICARE

## 2018-03-27 ENCOUNTER — HOSPITAL ENCOUNTER (OUTPATIENT)
Dept: PHYSICAL THERAPY | Age: 68
Setting detail: THERAPIES SERIES
Discharge: HOME | End: 2018-03-27
Payer: MEDICARE

## 2018-03-27 PROCEDURE — 97110 THERAPEUTIC EXERCISES: CPT

## 2018-03-27 NOTE — PROGRESS NOTES
knee extension with ankle on bolster 10 x 5 seconds-needing break in middle  Exercise 6: prone hang with 3 # ankle weight on x 5 minutes  Exercise 7: step up forward and lateral 6 inch R 10 x -  Exercise 8: step flexion, extension, 15 x 10 seconds  Exercise 10: Rocker Board 2 directions x 30 ; balance 30 seconds each direction with min UE support  Exercise 11: total gym level 8 B squat 30x         Activity Tolerance:  Activity Tolerance: Patient Tolerated treatment well    Assessment: Body structures, Functions, Activity limitations: Decreased functional mobility , Decreased strength, Decreased endurance, Decreased balance, Decreased ADL status  Assessment: worked 8 hours yesterday, soreness in knee at end of day after walking in afternoon was 1/10. Increased time with increased reps and time with prone hang  Prognosis: Good  REQUIRES PT FOLLOW UP: Yes    Patient Education:  Patient Education: Continue with HEP and working on range of motion. Barriers to Learning: none                      Plan:  Times per week: 2-3x   Plan weeks: 4 weeks  Specific instructions for Next Treatment: Post total knee rehab for knee ROM, strengthening right hip and knee m., gait training, balance. Nustep, mat exercises with progression to standing 3 way hip open and closed chain, knee extension concentration for ROM and decreased quad lag,  balance. step negotiation. Current Treatment Recommendations: Strengthening, ROM, Balance Training, Transfer Training, Gait Training, Safety Education & Training, Home Exercise Program, Stair training  Plan Comment:      Goals:  Patient goals : Get full function at right knee back.      Short term goals  Time Frame for Short term goals: deferred to LTG's    Long term goals  Time Frame for Long term goals : 4 weeks  Long term goal 1: Patient to demonstrate right knee ROM from  degrees flexion to 0 degrees to 120 degrees flexion with increased ease with transfers, ambulation and negotiating steps.   Long term goal 2: Patient to demonstrate RLE hip and knee strength from 3+/5 to 4/5 at hip adn knee musculature with stability with walking and negotiating steps. Long term goal 3: Patient to ambulate without assistive aid with no gait deviations with equal wt shift, step length for household and community distances. Long term goal 4: Patient independent in home ex program in order to meet above goals. Long term goal 5: Patient independent in home ex program in order to meet above goals. Adriana Yang # [id number]

## 2018-03-28 ENCOUNTER — HOSPITAL ENCOUNTER (OUTPATIENT)
Dept: PHYSICAL THERAPY | Age: 68
Setting detail: THERAPIES SERIES
Discharge: HOME | End: 2018-03-28
Payer: MEDICARE

## 2018-03-28 PROCEDURE — 97110 THERAPEUTIC EXERCISES: CPT

## 2018-03-28 NOTE — PROGRESS NOTES
break in middle  Exercise 6: prone hang with 4 # ankle weight on x 4 minutes  Exercise 7: step up forward and lateral 6 inch R 20 x -  Exercise 8: step flexion, extension, 15 x 10 seconds  Exercise 10: Rocker Board 2 directions x 30 ; balance 30 seconds each direction with min UE support, balance in middle x 30 seconds  Exercise 11: total gym level 8 B squat 30x         Activity Tolerance:  Activity Tolerance: Patient Tolerated treatment well    Assessment: Body structures, Functions, Activity limitations: Decreased functional mobility , Decreased strength, Decreased endurance, Decreased balance, Decreased ADL status  Assessment: increased extension AROM slowly but down to 2 degrees today. Prognosis: Good  REQUIRES PT FOLLOW UP: Yes    Patient Education:  Patient Education: Continue with HEP and working on range of motion. Barriers to Learning: none                      Plan:  Times per week: 2-3x   Plan weeks: 4 weeks  Specific instructions for Next Treatment: Post total knee rehab for knee ROM, strengthening right hip and knee m., gait training, balance. Nustep, mat exercises with progression to standing 3 way hip open and closed chain, knee extension concentration for ROM and decreased quad lag,  balance. step negotiation. Current Treatment Recommendations: Strengthening, ROM, Balance Training, Transfer Training, Gait Training, Safety Education & Training, Home Exercise Program, Stair training  Plan Comment:      Goals:  Patient goals : Get full function at right knee back. Short term goals  Time Frame for Short term goals: deferred to LTG's    Long term goals  Time Frame for Long term goals : 4 weeks  Long term goal 1: Patient to demonstrate right knee ROM from  degrees flexion to 0 degrees to 120 degrees flexion with increased ease with transfers, ambulation and negotiating steps.    Long term goal 2: Patient to demonstrate RLE hip and knee strength from 3+/5 to 4/5 at hip adn knee musculature

## 2018-03-30 ENCOUNTER — HOSPITAL ENCOUNTER (OUTPATIENT)
Dept: PHYSICAL THERAPY | Age: 68
Setting detail: THERAPIES SERIES
Discharge: HOME | End: 2018-03-30
Payer: MEDICARE

## 2018-03-30 PROCEDURE — 97110 THERAPEUTIC EXERCISES: CPT

## 2018-04-02 ENCOUNTER — HOSPITAL ENCOUNTER (OUTPATIENT)
Dept: PHYSICAL THERAPY | Age: 68
Setting detail: THERAPIES SERIES
Discharge: HOME | End: 2018-04-02
Payer: MEDICARE

## 2018-04-02 ENCOUNTER — HOSPITAL ENCOUNTER (OUTPATIENT)
Dept: OCCUPATIONAL THERAPY | Age: 68
Setting detail: THERAPIES SERIES
Discharge: HOME | End: 2018-04-02
Payer: MEDICARE

## 2018-04-02 PROCEDURE — 97110 THERAPEUTIC EXERCISES: CPT

## 2018-04-04 ENCOUNTER — HOSPITAL ENCOUNTER (OUTPATIENT)
Dept: PHYSICAL THERAPY | Age: 68
Setting detail: THERAPIES SERIES
Discharge: HOME | End: 2018-04-04
Payer: MEDICARE

## 2018-04-04 PROCEDURE — 97110 THERAPEUTIC EXERCISES: CPT

## 2018-04-05 ENCOUNTER — HOSPITAL ENCOUNTER (OUTPATIENT)
Dept: PHYSICAL THERAPY | Age: 68
Setting detail: THERAPIES SERIES
Discharge: HOME | End: 2018-04-05
Payer: MEDICARE

## 2018-04-05 PROCEDURE — 97110 THERAPEUTIC EXERCISES: CPT

## 2018-04-05 PROCEDURE — G8980 MOBILITY D/C STATUS: HCPCS

## 2018-04-05 PROCEDURE — G8979 MOBILITY GOAL STATUS: HCPCS

## 2018-04-09 ENCOUNTER — HOSPITAL ENCOUNTER (OUTPATIENT)
Dept: OCCUPATIONAL THERAPY | Age: 68
Setting detail: THERAPIES SERIES
Discharge: HOME | End: 2018-04-09
Payer: MEDICARE

## 2018-04-09 PROCEDURE — G8985 CARRY GOAL STATUS: HCPCS

## 2018-04-09 PROCEDURE — G8986 CARRY D/C STATUS: HCPCS

## 2018-04-09 PROCEDURE — 97110 THERAPEUTIC EXERCISES: CPT

## 2018-04-16 ENCOUNTER — APPOINTMENT (OUTPATIENT)
Dept: OCCUPATIONAL THERAPY | Age: 68
End: 2018-04-16
Payer: MEDICARE

## 2018-09-20 ENCOUNTER — HOSPITAL ENCOUNTER (OUTPATIENT)
Age: 68
Discharge: HOME | End: 2018-09-20
Payer: MEDICARE

## 2018-09-20 LAB
ANION GAP SERPL CALCULATED.3IONS-SCNC: 16 MEQ/L (ref 8–16)
BUN BLDV-MCNC: 16 MG/DL (ref 7–22)
CHLORIDE BLD-SCNC: 105 MEQ/L (ref 98–111)
CO2: 25 MEQ/L (ref 23–33)
CREAT SERPL-MCNC: 0.8 MG/DL (ref 0.4–1.2)
EKG ATRIAL RATE: 88 BPM
EKG P AXIS: 45 DEGREES
EKG P-R INTERVAL: 186 MS
EKG Q-T INTERVAL: 382 MS
EKG QRS DURATION: 110 MS
EKG QTC CALCULATION (BAZETT): 462 MS
EKG R AXIS: 5 DEGREES
EKG T AXIS: 37 DEGREES
EKG VENTRICULAR RATE: 88 BPM
GFR SERPL CREATININE-BSD FRML MDRD: > 90 ML/MIN/1.73M2
POTASSIUM SERPL-SCNC: 3.6 MEQ/L (ref 3.5–5.2)
SODIUM BLD-SCNC: 146 MEQ/L (ref 135–145)

## 2018-09-20 PROCEDURE — 80051 ELECTROLYTE PANEL: CPT

## 2018-09-20 PROCEDURE — 93005 ELECTROCARDIOGRAM TRACING: CPT | Performed by: ORTHOPAEDIC SURGERY

## 2018-09-20 PROCEDURE — 82565 ASSAY OF CREATININE: CPT

## 2018-09-20 PROCEDURE — 36415 COLL VENOUS BLD VENIPUNCTURE: CPT

## 2018-09-20 PROCEDURE — 84520 ASSAY OF UREA NITROGEN: CPT

## 2018-09-20 PROCEDURE — 93010 ELECTROCARDIOGRAM REPORT: CPT | Performed by: NUCLEAR MEDICINE

## 2018-10-11 ENCOUNTER — HOSPITAL ENCOUNTER (OUTPATIENT)
Dept: OCCUPATIONAL THERAPY | Age: 68
Setting detail: THERAPIES SERIES
Discharge: HOME | End: 2018-10-11
Payer: MEDICARE

## 2018-10-11 PROCEDURE — 97165 OT EVAL LOW COMPLEX 30 MIN: CPT

## 2018-10-11 PROCEDURE — G8988 SELF CARE GOAL STATUS: HCPCS

## 2018-10-11 PROCEDURE — G8987 SELF CARE CURRENT STATUS: HCPCS

## 2018-10-11 PROCEDURE — 97110 THERAPEUTIC EXERCISES: CPT

## 2018-10-11 ASSESSMENT — PAIN DESCRIPTION - PAIN TYPE: TYPE: SURGICAL PAIN

## 2018-10-11 ASSESSMENT — PAIN DESCRIPTION - ORIENTATION: ORIENTATION: RIGHT

## 2018-10-11 ASSESSMENT — PAIN DESCRIPTION - LOCATION: LOCATION: ELBOW

## 2018-10-11 ASSESSMENT — PAIN SCALES - GENERAL: PAINLEVEL_OUTOF10: 1

## 2018-10-16 ENCOUNTER — HOSPITAL ENCOUNTER (OUTPATIENT)
Dept: OCCUPATIONAL THERAPY | Age: 68
Setting detail: THERAPIES SERIES
Discharge: HOME | End: 2018-10-16
Payer: MEDICARE

## 2018-10-16 PROCEDURE — 97110 THERAPEUTIC EXERCISES: CPT

## 2018-10-16 PROCEDURE — 97597 DBRDMT OPN WND 1ST 20 CM/<: CPT

## 2018-10-18 ENCOUNTER — HOSPITAL ENCOUNTER (OUTPATIENT)
Dept: OCCUPATIONAL THERAPY | Age: 68
Setting detail: THERAPIES SERIES
Discharge: HOME | End: 2018-10-18
Payer: MEDICARE

## 2018-10-18 PROCEDURE — 97110 THERAPEUTIC EXERCISES: CPT

## 2018-10-18 NOTE — PROGRESS NOTES
[facility]  OUTPATIENT OCCUPATIONAL THERAPY  Daily Note  [address] [facility].    Time In: 2802  Time Out: 2799  Minutes: 30  Timed Code Treatment Minutes: 30 Minutes     Date: 10/18/2018  Patient Name: Cheryl Holguin        CSN: [id number]   : 1950  (74 y.o.)  Gender: male   Referring Practitioner: Dr. Emerald Murguia  Diagnosis: right elbow ulnar nerve transposition, right elbow arthroscopic removal of loose body, and right elbow arthroscopic debridement          General:  OT Visit Information  Onset Date: 10/11/18  OT Insurance Information: Medicare - no visit limit; no ionto, no hot/cold packs  Total # of Visits to Date: 3  Certification Period Expiration Date: 18  Progress Note Counter: 3/10 for PN  Comments: returns to physician on        Restrictions/Precautions:       Position Activity Restriction  Other position/activity restrictions: right ulnar nerve transposition 10/4/18; HTN; no lifting more than 10# for 6 weeks from 10/10/18. Subjective:  Subjective: Patient reports he tolerated last session well. No concerns at this time. Pain:  Patient Currently in Pain: No (No pain at rest today. )       Objective:     Upper Extremity Function  UE AROM: Supine active R elbow flexion/extension with forearm in neutral and supination x10 reps each, forearm pronation/supination x10.   UE PROM: Supine PROM and slow/progressive stretch to R elbow with forearm in neutral, supination, and pronation to tolerance. Passive forearm pronation and supination to tolerance. Manual massage to free up to forearm and elbow surrounding scars for edema management and scar tissue. Activity Tolerance: Additional Comments: Tolerated treatment well    Assessment:  Assessment: Progressing toward goals    Patient Education:  Patient Education: No changes to HEP.              Plan:  Plan Comment: Continue per established POC                       [employer] Pt needs to go to ER .

## 2018-10-23 ENCOUNTER — HOSPITAL ENCOUNTER (OUTPATIENT)
Dept: OCCUPATIONAL THERAPY | Age: 68
Setting detail: THERAPIES SERIES
Discharge: HOME | End: 2018-10-23
Payer: MEDICARE

## 2018-10-23 PROCEDURE — 97110 THERAPEUTIC EXERCISES: CPT

## 2018-10-23 NOTE — PROGRESS NOTES
[facility]  OUTPATIENT OCCUPATIONAL THERAPY  Daily Note  [address] [facility].    Time In: 9069  Time Out: 1520  Minutes: 30  Timed Code Treatment Minutes: 30 Minutes     Date: 10/23/2018  Patient Name: Rocio Sharif        CSN: [id number]   : 1950  (74 y.o.)  Gender: male   Referring Practitioner: Dr. Owen Spurling  Diagnosis: right elbow ulnar nerve transposition, right elbow arthroscopic removal of loose body, and right elbow arthroscopic debridement          General:  OT Visit Information  Onset Date: 10/11/18  OT Insurance Information: Medicare - no visit limit; no ionto, no hot/cold packs  Total # of Visits to Date: 4  Certification Period Expiration Date: 18  Progress Note Counter: 4/10 for PN  Comments: returns to physician on        Restrictions/Precautions:       Position Activity Restriction  Other position/activity restrictions: right ulnar nerve transposition 10/4/18; HTN; no lifting more than 10# for 6 weeks from 10/10/18. Subjective:  Subjective: States that he is finding that he can do things now that he couldn't do before the surgery such as shaving with right hand, eating with right hand, and reaching for things with right hand. Pain:  Patient Currently in Pain: No (Reports no pain, but does have tenderness along incision)       Objective:     Upper Extremity Function  UE PROM: pulleys for right elbow extension and shoulder flexion x 20 reps; supine PROM to right elbow for flexion and extension - prolonged hold at end of available motion in both directions   UE Stretching: scar massage to right elbow to assist with scar management                                                Activity Tolerance: Additional Comments:  Tolerated treatment well    Assessment:  Assessment: Progressing toward goals    Patient Education:        No new patient education completed this date       Plan:  Plan Comment: Continue per established POC   Specific

## 2018-10-25 ENCOUNTER — HOSPITAL ENCOUNTER (OUTPATIENT)
Dept: OCCUPATIONAL THERAPY | Age: 68
Setting detail: THERAPIES SERIES
Discharge: HOME | End: 2018-10-25
Payer: MEDICARE

## 2018-10-25 PROCEDURE — 97110 THERAPEUTIC EXERCISES: CPT

## 2018-10-25 NOTE — PROGRESS NOTES
[address] [address]  OUTPATIENT OCCUPATIONAL THERAPY  Daily Note  [address] [facility].    Time In: 6708  Time Out: [address]  Minutes: 25  Timed Code Treatment Minutes: 25 Minutes     Date: 10/25/2018  Patient Name: Morelia Das        CSN: [id number]   : 1950  (74 y.o.)  Gender: male   Referring Practitioner: Dr. Evin López  Diagnosis: right elbow ulnar nerve transposition, right elbow arthroscopic removal of loose body, and right elbow arthroscopic debridement          General:  OT Visit Information  Onset Date: 10/11/18  OT Insurance Information: Medicare - no visit limit; no ionto, no hot/cold packs  Total # of Visits to Date: 5  Certification Period Expiration Date: 18  Progress Note Counter: 5/10 for PN  Comments: returns to physician on        Restrictions/Precautions:       Position Activity Restriction  Other position/activity restrictions: right ulnar nerve transposition 10/4/18; HTN; no lifting more than 10# for 6 weeks from 10/10/18. Subjective:  Subjective: States that his right shoulder does get sore after stretching his elbow, but relates that it doesn't last long          Pain:  Patient Currently in Pain: No (Reports no pain, but does have tenderness along incision)       Objective:     Upper Extremity Function  UE PROM: pulleys for right elbow extension and shoulder flexion x 20 reps; PROM to right elbow for flexion and extension with prolonged hold for maximal ROM; PROM completed for right supination/pronation to patient tolerace  UE AAROM: seated saeboglide with right UE for elbow extension  UE Stretching: deep massage to right anterior elbow for soft tissue release - tightness in bicep region, but loosed nicely                                                Activity Tolerance: Additional Comments:  Tolerated treatment well    Assessment:  Assessment: Progressing toward goals    Patient Education:     No new patient education completed this date

## 2018-11-06 ENCOUNTER — HOSPITAL ENCOUNTER (OUTPATIENT)
Dept: OCCUPATIONAL THERAPY | Age: 68
Setting detail: THERAPIES SERIES
Discharge: HOME | End: 2018-11-06
Payer: MEDICARE

## 2018-11-06 PROCEDURE — 97110 THERAPEUTIC EXERCISES: CPT

## 2018-11-08 ENCOUNTER — HOSPITAL ENCOUNTER (OUTPATIENT)
Dept: OCCUPATIONAL THERAPY | Age: 68
Setting detail: THERAPIES SERIES
Discharge: HOME | End: 2018-11-08
Payer: MEDICARE

## 2018-11-08 PROCEDURE — G8989 SELF CARE D/C STATUS: HCPCS

## 2018-11-08 PROCEDURE — 97110 THERAPEUTIC EXERCISES: CPT

## 2018-11-08 PROCEDURE — G8988 SELF CARE GOAL STATUS: HCPCS

## 2018-12-20 RX ORDER — IRBESARTAN AND HYDROCHLOROTHIAZIDE 150; 12.5 MG/1; MG/1
TABLET, FILM COATED ORAL
Qty: 90 TABLET | Refills: 3 | Status: SHIPPED | OUTPATIENT
Start: 2018-12-20 | End: 2020-01-27 | Stop reason: SDUPTHER

## 2019-02-15 RX ORDER — METOPROLOL SUCCINATE 25 MG/1
TABLET, EXTENDED RELEASE ORAL
Qty: 90 TABLET | Refills: 3 | Status: SHIPPED | OUTPATIENT
Start: 2019-02-15 | End: 2019-05-07 | Stop reason: SDUPTHER

## 2019-02-15 RX ORDER — ALLOPURINOL 300 MG/1
TABLET ORAL
Qty: 90 TABLET | Refills: 3 | Status: SHIPPED | OUTPATIENT
Start: 2019-02-15 | End: 2020-01-27 | Stop reason: SDUPTHER

## 2019-03-07 ENCOUNTER — OFFICE VISIT (OUTPATIENT)
Dept: FAMILY MEDICINE CLINIC | Age: 69
End: 2019-03-07
Payer: MEDICARE

## 2019-03-07 VITALS
WEIGHT: 229 LBS | OXYGEN SATURATION: 96 % | DIASTOLIC BLOOD PRESSURE: 80 MMHG | RESPIRATION RATE: 14 BRPM | SYSTOLIC BLOOD PRESSURE: 134 MMHG | HEIGHT: 72 IN | HEART RATE: 105 BPM | BODY MASS INDEX: 31.02 KG/M2

## 2019-03-07 DIAGNOSIS — Z00.00 ROUTINE GENERAL MEDICAL EXAMINATION AT A HEALTH CARE FACILITY: ICD-10-CM

## 2019-03-07 DIAGNOSIS — E78.00 PURE HYPERCHOLESTEROLEMIA: ICD-10-CM

## 2019-03-07 DIAGNOSIS — Z12.5 SCREENING FOR PROSTATE CANCER: ICD-10-CM

## 2019-03-07 DIAGNOSIS — M10.00 IDIOPATHIC GOUT, UNSPECIFIED CHRONICITY, UNSPECIFIED SITE: ICD-10-CM

## 2019-03-07 DIAGNOSIS — Z23 NEED FOR 23-POLYVALENT PNEUMOCOCCAL POLYSACCHARIDE VACCINE: ICD-10-CM

## 2019-03-07 DIAGNOSIS — R73.01 IFG (IMPAIRED FASTING GLUCOSE): ICD-10-CM

## 2019-03-07 DIAGNOSIS — I10 ESSENTIAL HYPERTENSION: Primary | ICD-10-CM

## 2019-03-07 PROCEDURE — 90732 PPSV23 VACC 2 YRS+ SUBQ/IM: CPT | Performed by: FAMILY MEDICINE

## 2019-03-07 PROCEDURE — G0009 ADMIN PNEUMOCOCCAL VACCINE: HCPCS | Performed by: FAMILY MEDICINE

## 2019-03-07 PROCEDURE — 4040F PNEUMOC VAC/ADMIN/RCVD: CPT | Performed by: FAMILY MEDICINE

## 2019-03-07 PROCEDURE — 1123F ACP DISCUSS/DSCN MKR DOCD: CPT | Performed by: FAMILY MEDICINE

## 2019-03-07 PROCEDURE — 99214 OFFICE O/P EST MOD 30 MIN: CPT | Performed by: FAMILY MEDICINE

## 2019-03-07 PROCEDURE — 3017F COLORECTAL CA SCREEN DOC REV: CPT | Performed by: FAMILY MEDICINE

## 2019-03-07 PROCEDURE — G8427 DOCREV CUR MEDS BY ELIG CLIN: HCPCS | Performed by: FAMILY MEDICINE

## 2019-03-07 PROCEDURE — G8417 CALC BMI ABV UP PARAM F/U: HCPCS | Performed by: FAMILY MEDICINE

## 2019-03-07 PROCEDURE — G8484 FLU IMMUNIZE NO ADMIN: HCPCS | Performed by: FAMILY MEDICINE

## 2019-03-07 PROCEDURE — G0439 PPPS, SUBSEQ VISIT: HCPCS | Performed by: FAMILY MEDICINE

## 2019-03-07 PROCEDURE — 1101F PT FALLS ASSESS-DOCD LE1/YR: CPT | Performed by: FAMILY MEDICINE

## 2019-03-07 PROCEDURE — 1036F TOBACCO NON-USER: CPT | Performed by: FAMILY MEDICINE

## 2019-03-07 ASSESSMENT — PATIENT HEALTH QUESTIONNAIRE - PHQ9
SUM OF ALL RESPONSES TO PHQ QUESTIONS 1-9: 0
SUM OF ALL RESPONSES TO PHQ QUESTIONS 1-9: 0

## 2019-03-07 ASSESSMENT — ANXIETY QUESTIONNAIRES: GAD7 TOTAL SCORE: 0

## 2019-03-07 ASSESSMENT — ENCOUNTER SYMPTOMS
RESPIRATORY NEGATIVE: 1
GASTROINTESTINAL NEGATIVE: 1

## 2019-03-07 ASSESSMENT — LIFESTYLE VARIABLES: HOW OFTEN DO YOU HAVE A DRINK CONTAINING ALCOHOL: 0

## 2019-03-08 ENCOUNTER — HOSPITAL ENCOUNTER (OUTPATIENT)
Age: 69
Discharge: HOME | End: 2019-03-08
Payer: MEDICARE

## 2019-03-08 DIAGNOSIS — Z12.5 SCREENING FOR PROSTATE CANCER: ICD-10-CM

## 2019-03-08 DIAGNOSIS — R73.01 IFG (IMPAIRED FASTING GLUCOSE): ICD-10-CM

## 2019-03-08 DIAGNOSIS — E78.00 PURE HYPERCHOLESTEROLEMIA: ICD-10-CM

## 2019-03-08 DIAGNOSIS — I10 ESSENTIAL HYPERTENSION: ICD-10-CM

## 2019-03-08 DIAGNOSIS — M10.00 IDIOPATHIC GOUT, UNSPECIFIED CHRONICITY, UNSPECIFIED SITE: ICD-10-CM

## 2019-03-08 LAB
ALBUMIN SERPL-MCNC: 4.6 G/DL (ref 3.5–5.1)
ALP BLD-CCNC: 96 U/L (ref 38–126)
ALT SERPL-CCNC: 26 U/L (ref 11–66)
ANION GAP SERPL CALCULATED.3IONS-SCNC: 14 MEQ/L (ref 8–16)
AST SERPL-CCNC: 27 U/L (ref 5–40)
AVERAGE GLUCOSE: 111 MG/DL (ref 70–126)
BASOPHILS # BLD: 0.8 %
BASOPHILS ABSOLUTE: 0 THOU/MM3 (ref 0–0.1)
BILIRUB SERPL-MCNC: 0.7 MG/DL (ref 0.3–1.2)
BUN BLDV-MCNC: 17 MG/DL (ref 7–22)
CALCIUM SERPL-MCNC: 9.2 MG/DL (ref 8.5–10.5)
CHLORIDE BLD-SCNC: 104 MEQ/L (ref 98–111)
CHOLESTEROL, TOTAL: 159 MG/DL (ref 100–199)
CO2: 24 MEQ/L (ref 23–33)
CREAT SERPL-MCNC: 0.8 MG/DL (ref 0.4–1.2)
EOSINOPHIL # BLD: 1.1 %
EOSINOPHILS ABSOLUTE: 0.1 THOU/MM3 (ref 0–0.4)
ERYTHROCYTE [DISTWIDTH] IN BLOOD BY AUTOMATED COUNT: 13.5 % (ref 11.5–14.5)
ERYTHROCYTE [DISTWIDTH] IN BLOOD BY AUTOMATED COUNT: 47.5 FL (ref 35–45)
GFR SERPL CREATININE-BSD FRML MDRD: > 90 ML/MIN/1.73M2
GLUCOSE BLD-MCNC: 106 MG/DL (ref 70–108)
HBA1C MFR BLD: 5.7 % (ref 4.4–6.4)
HCT VFR BLD CALC: 48.2 % (ref 42–52)
HDLC SERPL-MCNC: 43 MG/DL
HEMOGLOBIN: 15.7 GM/DL (ref 14–18)
IMMATURE GRANS (ABS): 0.01 THOU/MM3 (ref 0–0.07)
IMMATURE GRANULOCYTES: 0.2 %
LDL CHOLESTEROL CALCULATED: 93 MG/DL
LYMPHOCYTES # BLD: 19.9 %
LYMPHOCYTES ABSOLUTE: 1.2 THOU/MM3 (ref 1–4.8)
MAGNESIUM: 2.3 MG/DL (ref 1.6–2.4)
MCH RBC QN AUTO: 31 PG (ref 26–33)
MCHC RBC AUTO-ENTMCNC: 32.6 GM/DL (ref 32.2–35.5)
MCV RBC AUTO: 95.3 FL (ref 80–94)
MONOCYTES # BLD: 10.4 %
MONOCYTES ABSOLUTE: 0.6 THOU/MM3 (ref 0.4–1.3)
NUCLEATED RED BLOOD CELLS: 0 /100 WBC
PLATELET # BLD: 222 THOU/MM3 (ref 130–400)
PMV BLD AUTO: 10.6 FL (ref 9.4–12.4)
POTASSIUM SERPL-SCNC: 3.8 MEQ/L (ref 3.5–5.2)
PROSTATE SPECIFIC ANTIGEN: 0.63 NG/ML (ref 0–1)
RBC # BLD: 5.06 MILL/MM3 (ref 4.7–6.1)
SEG NEUTROPHILS: 67.6 %
SEGMENTED NEUTROPHILS ABSOLUTE COUNT: 4.2 THOU/MM3 (ref 1.8–7.7)
SODIUM BLD-SCNC: 142 MEQ/L (ref 135–145)
TOTAL PROTEIN: 7.1 G/DL (ref 6.1–8)
TRIGL SERPL-MCNC: 116 MG/DL (ref 0–199)
TSH SERPL DL<=0.05 MIU/L-ACNC: 3.82 UIU/ML (ref 0.4–4.2)
URIC ACID: 5.4 MG/DL (ref 3.7–7)
WBC # BLD: 6.2 THOU/MM3 (ref 4.8–10.8)

## 2019-03-08 PROCEDURE — 80053 COMPREHEN METABOLIC PANEL: CPT

## 2019-03-08 PROCEDURE — 83735 ASSAY OF MAGNESIUM: CPT

## 2019-03-08 PROCEDURE — 83036 HEMOGLOBIN GLYCOSYLATED A1C: CPT

## 2019-03-08 PROCEDURE — 84550 ASSAY OF BLOOD/URIC ACID: CPT

## 2019-03-08 PROCEDURE — G0103 PSA SCREENING: HCPCS

## 2019-03-08 PROCEDURE — 80061 LIPID PANEL: CPT

## 2019-03-08 PROCEDURE — 84443 ASSAY THYROID STIM HORMONE: CPT

## 2019-03-08 PROCEDURE — 84153 ASSAY OF PSA TOTAL: CPT

## 2019-03-08 PROCEDURE — 36415 COLL VENOUS BLD VENIPUNCTURE: CPT

## 2019-03-08 PROCEDURE — 85025 COMPLETE CBC W/AUTO DIFF WBC: CPT

## 2019-05-07 RX ORDER — METOPROLOL SUCCINATE 25 MG/1
TABLET, EXTENDED RELEASE ORAL
Qty: 90 TABLET | Refills: 3 | Status: SHIPPED | OUTPATIENT
Start: 2019-05-07 | End: 2020-01-27 | Stop reason: SDUPTHER

## 2019-09-26 ENCOUNTER — HOSPITAL ENCOUNTER (OUTPATIENT)
Dept: MRI IMAGING | Age: 69
Discharge: HOME | End: 2019-09-26
Payer: MEDICARE

## 2019-09-26 DIAGNOSIS — R52 PAIN: ICD-10-CM

## 2019-09-26 DIAGNOSIS — M75.101 TEAR OF RIGHT ROTATOR CUFF, UNSPECIFIED TEAR EXTENT, UNSPECIFIED WHETHER TRAUMATIC: ICD-10-CM

## 2019-09-26 PROCEDURE — 73221 MRI JOINT UPR EXTREM W/O DYE: CPT

## 2019-10-09 ENCOUNTER — HOSPITAL ENCOUNTER (OUTPATIENT)
Age: 69
Discharge: HOME | End: 2019-10-09
Payer: MEDICARE

## 2019-10-09 LAB
ANION GAP SERPL CALCULATED.3IONS-SCNC: 13 MEQ/L (ref 8–16)
BUN BLDV-MCNC: 12 MG/DL (ref 7–22)
CHLORIDE BLD-SCNC: 105 MEQ/L (ref 98–111)
CO2: 23 MEQ/L (ref 23–33)
CREAT SERPL-MCNC: 0.7 MG/DL (ref 0.4–1.2)
EKG ATRIAL RATE: 74 BPM
EKG P AXIS: 20 DEGREES
EKG P-R INTERVAL: 174 MS
EKG Q-T INTERVAL: 392 MS
EKG QRS DURATION: 104 MS
EKG QTC CALCULATION (BAZETT): 435 MS
EKG R AXIS: -8 DEGREES
EKG T AXIS: 22 DEGREES
EKG VENTRICULAR RATE: 74 BPM
ERYTHROCYTE [DISTWIDTH] IN BLOOD BY AUTOMATED COUNT: 14 % (ref 11.5–14.5)
ERYTHROCYTE [DISTWIDTH] IN BLOOD BY AUTOMATED COUNT: 49.8 FL (ref 35–45)
GFR SERPL CREATININE-BSD FRML MDRD: > 90 ML/MIN/1.73M2
HCT VFR BLD CALC: 50.2 % (ref 42–52)
HEMOGLOBIN: 16.1 GM/DL (ref 14–18)
MCH RBC QN AUTO: 30.9 PG (ref 26–33)
MCHC RBC AUTO-ENTMCNC: 32.1 GM/DL (ref 32.2–35.5)
MCV RBC AUTO: 96.4 FL (ref 80–94)
PLATELET # BLD: 213 THOU/MM3 (ref 130–400)
PMV BLD AUTO: 10.6 FL (ref 9.4–12.4)
POTASSIUM SERPL-SCNC: 4.7 MEQ/L (ref 3.5–5.2)
RBC # BLD: 5.21 MILL/MM3 (ref 4.7–6.1)
SODIUM BLD-SCNC: 141 MEQ/L (ref 135–145)
WBC # BLD: 6.1 THOU/MM3 (ref 4.8–10.8)

## 2019-10-09 PROCEDURE — 93010 ELECTROCARDIOGRAM REPORT: CPT | Performed by: NUCLEAR MEDICINE

## 2019-10-09 PROCEDURE — 36415 COLL VENOUS BLD VENIPUNCTURE: CPT

## 2019-10-09 PROCEDURE — 84520 ASSAY OF UREA NITROGEN: CPT

## 2019-10-09 PROCEDURE — 85027 COMPLETE CBC AUTOMATED: CPT

## 2019-10-09 PROCEDURE — 93005 ELECTROCARDIOGRAM TRACING: CPT

## 2019-10-09 PROCEDURE — 82565 ASSAY OF CREATININE: CPT

## 2019-10-09 PROCEDURE — 80051 ELECTROLYTE PANEL: CPT

## 2019-10-17 ENCOUNTER — APPOINTMENT (OUTPATIENT)
Dept: ULTRASOUND IMAGING | Age: 69
DRG: 414 | End: 2019-10-17
Payer: MEDICARE

## 2019-10-17 ENCOUNTER — APPOINTMENT (OUTPATIENT)
Dept: GENERAL RADIOLOGY | Age: 69
DRG: 414 | End: 2019-10-17
Payer: MEDICARE

## 2019-10-17 ENCOUNTER — APPOINTMENT (OUTPATIENT)
Dept: CT IMAGING | Age: 69
DRG: 414 | End: 2019-10-17
Payer: MEDICARE

## 2019-10-17 ENCOUNTER — HOSPITAL ENCOUNTER (INPATIENT)
Age: 69
LOS: 11 days | Discharge: HOME | DRG: 414 | End: 2019-10-28
Attending: NURSE PRACTITIONER | Admitting: INTERNAL MEDICINE
Payer: MEDICARE

## 2019-10-17 DIAGNOSIS — K85.90 PANCREATITIS DUE TO COMMON BILE DUCT STONE: Primary | ICD-10-CM

## 2019-10-17 DIAGNOSIS — G89.18 ACUTE POSTOPERATIVE PAIN: ICD-10-CM

## 2019-10-17 DIAGNOSIS — Z90.49 S/P CHOLECYSTECTOMY: ICD-10-CM

## 2019-10-17 DIAGNOSIS — K80.50 PANCREATITIS DUE TO COMMON BILE DUCT STONE: Primary | ICD-10-CM

## 2019-10-17 LAB
ALBUMIN SERPL-MCNC: 3.6 G/DL (ref 3.5–5.1)
ALP BLD-CCNC: 87 U/L (ref 38–126)
ALT SERPL-CCNC: 152 U/L (ref 11–66)
AST SERPL-CCNC: 277 U/L (ref 5–40)
BACTERIA: ABNORMAL /HPF
BASOPHILS # BLD: 0.3 %
BASOPHILS ABSOLUTE: 0 THOU/MM3 (ref 0–0.1)
BILIRUB SERPL-MCNC: 1.6 MG/DL (ref 0.3–1.2)
BILIRUBIN DIRECT: 1.1 MG/DL (ref 0–0.3)
BILIRUBIN URINE: ABNORMAL
BLOOD, URINE: NEGATIVE
BUN, WHOLE BLOOD: 20 MG/DL (ref 8–26)
CASTS 2: ABNORMAL /LPF
CASTS UA: ABNORMAL /LPF
CHARACTER, URINE: ABNORMAL
CHLORIDE, WHOLE BLOOD: 104 MEQ/L (ref 98–109)
COLOR: ABNORMAL
CREAT SERPL-MCNC: 0.8 MG/DL (ref 0.5–1.2)
CRYSTALS, UA: ABNORMAL
EOSINOPHIL # BLD: 0.8 %
EOSINOPHILS ABSOLUTE: 0.1 THOU/MM3 (ref 0–0.4)
EPITHELIAL CELLS, UA: ABNORMAL /HPF
GFR, ESTIMATED: > 90 ML/MIN/1.73M2
GLUCOSE BLD-MCNC: 149 MG/DL (ref 70–108)
GLUCOSE URINE: NEGATIVE MG/DL
GLUCOSE, WHOLE BLOOD: 173 MG/DL (ref 70–108)
HCT VFR BLD CALC: 47.2 % (ref 42–52)
HEMOGLOBIN: 16.3 GM/DL (ref 14–18)
ICTOTEST: POSITIVE
IMMATURE GRANS (ABS): 0.09 THOU/MM3 (ref 0–0.07)
IMMATURE GRANULOCYTES: 0.6 %
KETONES, URINE: ABNORMAL
LEUKOCYTE ESTERASE, URINE: ABNORMAL
LIPASE: > 3000 U/L (ref 5.6–51.3)
LYMPHOCYTES # BLD: 3.7 %
LYMPHOCYTES ABSOLUTE: 0.6 THOU/MM3 (ref 1–4.8)
MCH RBC QN AUTO: 31.8 PG (ref 27–31)
MCHC RBC AUTO-ENTMCNC: 34.5 GM/DL (ref 33–37)
MCV RBC AUTO: 92 FL (ref 80–94)
MISCELLANEOUS 2: ABNORMAL
MONOCYTES # BLD: 4.9 %
MONOCYTES ABSOLUTE: 0.7 THOU/MM3 (ref 0.4–1.3)
MUCUS: ABNORMAL
NITRITE, URINE: NEGATIVE
NUCLEATED RED BLOOD CELLS: 0 /100 WBC
PDW BLD-RTO: 14.9 % (ref 11.5–14.5)
PH UA: 5.5 (ref 5–9)
PLATELET # BLD: 213 THOU/MM3 (ref 130–400)
PMV BLD AUTO: 10.8 FL (ref 7.4–10.4)
POTASSIUM, WHOLE BLOOD: 3.8 MEQ/L (ref 3.5–4.9)
PROTEIN UA: 30
RBC # BLD: 5.13 MILL/MM3 (ref 4.7–6.1)
RBC URINE: ABNORMAL /HPF
RENAL EPITHELIAL, UA: ABNORMAL
SEG NEUTROPHILS: 89.7 %
SEGMENTED NEUTROPHILS ABSOLUTE COUNT: 13.6 THOU/MM3 (ref 1.8–7.7)
SODIUM BLD-SCNC: 141 MEQ/L (ref 138–146)
SPECIFIC GRAVITY, URINE: > 1.03 (ref 1–1.03)
TOTAL CO2, WHOLE BLOOD: 26 MEQ/L (ref 23–33)
TOTAL PROTEIN: 6.4 G/DL (ref 6.1–8)
UROBILINOGEN, URINE: 1 EU/DL (ref 0–1)
WBC # BLD: 15.2 THOU/MM3 (ref 4.8–10.8)
WBC UA: ABNORMAL /HPF
YEAST: ABNORMAL

## 2019-10-17 PROCEDURE — 84520 ASSAY OF UREA NITROGEN: CPT

## 2019-10-17 PROCEDURE — 99215 OFFICE O/P EST HI 40 MIN: CPT

## 2019-10-17 PROCEDURE — 82948 REAGENT STRIP/BLOOD GLUCOSE: CPT

## 2019-10-17 PROCEDURE — 6360000002 HC RX W HCPCS: Performed by: NURSE PRACTITIONER

## 2019-10-17 PROCEDURE — 96376 TX/PRO/DX INJ SAME DRUG ADON: CPT

## 2019-10-17 PROCEDURE — 2580000003 HC RX 258: Performed by: NURSE PRACTITIONER

## 2019-10-17 PROCEDURE — 36415 COLL VENOUS BLD VENIPUNCTURE: CPT

## 2019-10-17 PROCEDURE — 2580000003 HC RX 258: Performed by: PHYSICIAN ASSISTANT

## 2019-10-17 PROCEDURE — 85025 COMPLETE CBC W/AUTO DIFF WBC: CPT

## 2019-10-17 PROCEDURE — 76705 ECHO EXAM OF ABDOMEN: CPT

## 2019-10-17 PROCEDURE — 2709999900 HC NON-CHARGEABLE SUPPLY

## 2019-10-17 PROCEDURE — 6360000002 HC RX W HCPCS: Performed by: PHYSICIAN ASSISTANT

## 2019-10-17 PROCEDURE — 2060000000 HC ICU INTERMEDIATE R&B

## 2019-10-17 PROCEDURE — 6360000004 HC RX CONTRAST MEDICATION: Performed by: PHYSICIAN ASSISTANT

## 2019-10-17 PROCEDURE — 99223 1ST HOSP IP/OBS HIGH 75: CPT | Performed by: NURSE PRACTITIONER

## 2019-10-17 PROCEDURE — 2500000003 HC RX 250 WO HCPCS: Performed by: PHYSICIAN ASSISTANT

## 2019-10-17 PROCEDURE — 99285 EMERGENCY DEPT VISIT HI MDM: CPT

## 2019-10-17 PROCEDURE — 71046 X-RAY EXAM CHEST 2 VIEWS: CPT

## 2019-10-17 PROCEDURE — 96374 THER/PROPH/DIAG INJ IV PUSH: CPT

## 2019-10-17 PROCEDURE — 80076 HEPATIC FUNCTION PANEL: CPT

## 2019-10-17 PROCEDURE — 82947 ASSAY GLUCOSE BLOOD QUANT: CPT

## 2019-10-17 PROCEDURE — 80051 ELECTROLYTE PANEL: CPT

## 2019-10-17 PROCEDURE — 82565 ASSAY OF CREATININE: CPT

## 2019-10-17 PROCEDURE — 81001 URINALYSIS AUTO W/SCOPE: CPT

## 2019-10-17 PROCEDURE — 96375 TX/PRO/DX INJ NEW DRUG ADDON: CPT

## 2019-10-17 PROCEDURE — 6370000000 HC RX 637 (ALT 250 FOR IP): Performed by: NURSE PRACTITIONER

## 2019-10-17 PROCEDURE — 83690 ASSAY OF LIPASE: CPT

## 2019-10-17 PROCEDURE — 74177 CT ABD & PELVIS W/CONTRAST: CPT

## 2019-10-17 RX ORDER — MORPHINE SULFATE 4 MG/ML
4 INJECTION, SOLUTION INTRAMUSCULAR; INTRAVENOUS ONCE
Status: COMPLETED | OUTPATIENT
Start: 2019-10-17 | End: 2019-10-17

## 2019-10-17 RX ORDER — METOPROLOL TARTRATE 5 MG/5ML
5 INJECTION INTRAVENOUS ONCE
Status: COMPLETED | OUTPATIENT
Start: 2019-10-17 | End: 2019-10-17

## 2019-10-17 RX ORDER — ONDANSETRON 2 MG/ML
4 INJECTION INTRAMUSCULAR; INTRAVENOUS ONCE
Status: COMPLETED | OUTPATIENT
Start: 2019-10-17 | End: 2019-10-17

## 2019-10-17 RX ORDER — LOSARTAN POTASSIUM 50 MG/1
50 TABLET ORAL DAILY
Status: DISCONTINUED | OUTPATIENT
Start: 2019-10-17 | End: 2019-10-28 | Stop reason: HOSPADM

## 2019-10-17 RX ORDER — SODIUM CHLORIDE 0.9 % (FLUSH) 0.9 %
10 SYRINGE (ML) INJECTION PRN
Status: DISCONTINUED | OUTPATIENT
Start: 2019-10-17 | End: 2019-10-21 | Stop reason: SDUPTHER

## 2019-10-17 RX ORDER — HYDROCHLOROTHIAZIDE 25 MG/1
12.5 TABLET ORAL DAILY
Status: DISCONTINUED | OUTPATIENT
Start: 2019-10-17 | End: 2019-10-28 | Stop reason: HOSPADM

## 2019-10-17 RX ORDER — MORPHINE SULFATE 2 MG/ML
2 INJECTION, SOLUTION INTRAMUSCULAR; INTRAVENOUS
Status: DISCONTINUED | OUTPATIENT
Start: 2019-10-17 | End: 2019-10-18

## 2019-10-17 RX ORDER — POTASSIUM CHLORIDE 7.45 MG/ML
10 INJECTION INTRAVENOUS PRN
Status: DISCONTINUED | OUTPATIENT
Start: 2019-10-17 | End: 2019-10-28 | Stop reason: HOSPADM

## 2019-10-17 RX ORDER — NICOTINE POLACRILEX 4 MG
15 LOZENGE BUCCAL PRN
Status: DISCONTINUED | OUTPATIENT
Start: 2019-10-17 | End: 2019-10-28 | Stop reason: HOSPADM

## 2019-10-17 RX ORDER — SODIUM CHLORIDE 9 MG/ML
INJECTION, SOLUTION INTRAVENOUS CONTINUOUS
Status: DISCONTINUED | OUTPATIENT
Start: 2019-10-17 | End: 2019-10-17 | Stop reason: HOSPADM

## 2019-10-17 RX ORDER — IRBESARTAN AND HYDROCHLOROTHIAZIDE 150; 12.5 MG/1; MG/1
1 TABLET, FILM COATED ORAL DAILY
Status: DISCONTINUED | OUTPATIENT
Start: 2019-10-17 | End: 2019-10-17 | Stop reason: CLARIF

## 2019-10-17 RX ORDER — SODIUM CHLORIDE, SODIUM LACTATE, POTASSIUM CHLORIDE, CALCIUM CHLORIDE 600; 310; 30; 20 MG/100ML; MG/100ML; MG/100ML; MG/100ML
INJECTION, SOLUTION INTRAVENOUS CONTINUOUS
Status: ACTIVE | OUTPATIENT
Start: 2019-10-17 | End: 2019-10-20

## 2019-10-17 RX ORDER — SODIUM CHLORIDE, SODIUM LACTATE, POTASSIUM CHLORIDE, CALCIUM CHLORIDE 600; 310; 30; 20 MG/100ML; MG/100ML; MG/100ML; MG/100ML
150 INJECTION, SOLUTION INTRAVENOUS CONTINUOUS
Status: DISCONTINUED | OUTPATIENT
Start: 2019-10-17 | End: 2019-10-17

## 2019-10-17 RX ORDER — ACETAMINOPHEN 325 MG/1
650 TABLET ORAL EVERY 4 HOURS PRN
Status: DISCONTINUED | OUTPATIENT
Start: 2019-10-17 | End: 2019-10-28 | Stop reason: HOSPADM

## 2019-10-17 RX ORDER — METOPROLOL SUCCINATE 25 MG/1
25 TABLET, EXTENDED RELEASE ORAL DAILY
Status: DISCONTINUED | OUTPATIENT
Start: 2019-10-17 | End: 2019-10-19 | Stop reason: DRUGHIGH

## 2019-10-17 RX ORDER — ONDANSETRON 2 MG/ML
4 INJECTION INTRAMUSCULAR; INTRAVENOUS EVERY 6 HOURS PRN
Status: DISCONTINUED | OUTPATIENT
Start: 2019-10-17 | End: 2019-10-18

## 2019-10-17 RX ORDER — KETOROLAC TROMETHAMINE 30 MG/ML
30 INJECTION, SOLUTION INTRAMUSCULAR; INTRAVENOUS ONCE
Status: COMPLETED | OUTPATIENT
Start: 2019-10-17 | End: 2019-10-17

## 2019-10-17 RX ORDER — SODIUM CHLORIDE 0.9 % (FLUSH) 0.9 %
10 SYRINGE (ML) INJECTION EVERY 12 HOURS SCHEDULED
Status: DISCONTINUED | OUTPATIENT
Start: 2019-10-17 | End: 2019-10-21 | Stop reason: SDUPTHER

## 2019-10-17 RX ORDER — ONDANSETRON 2 MG/ML
4 INJECTION INTRAMUSCULAR; INTRAVENOUS EVERY 6 HOURS PRN
Status: DISCONTINUED | OUTPATIENT
Start: 2019-10-17 | End: 2019-10-17 | Stop reason: SDUPTHER

## 2019-10-17 RX ORDER — 0.9 % SODIUM CHLORIDE 0.9 %
500 INTRAVENOUS SOLUTION INTRAVENOUS ONCE
Status: COMPLETED | OUTPATIENT
Start: 2019-10-17 | End: 2019-10-17

## 2019-10-17 RX ADMIN — INSULIN LISPRO 1 UNITS: 100 INJECTION, SOLUTION INTRAVENOUS; SUBCUTANEOUS at 21:15

## 2019-10-17 RX ADMIN — ENOXAPARIN SODIUM 40 MG: 40 INJECTION SUBCUTANEOUS at 21:14

## 2019-10-17 RX ADMIN — KETOROLAC TROMETHAMINE 30 MG: 30 INJECTION, SOLUTION INTRAMUSCULAR at 11:00

## 2019-10-17 RX ADMIN — SODIUM CHLORIDE 500 ML: 9 INJECTION, SOLUTION INTRAVENOUS at 12:57

## 2019-10-17 RX ADMIN — SODIUM CHLORIDE, POTASSIUM CHLORIDE, SODIUM LACTATE AND CALCIUM CHLORIDE: 600; 310; 30; 20 INJECTION, SOLUTION INTRAVENOUS at 21:14

## 2019-10-17 RX ADMIN — IOPAMIDOL 80 ML: 755 INJECTION, SOLUTION INTRAVENOUS at 14:08

## 2019-10-17 RX ADMIN — SODIUM CHLORIDE: 9 INJECTION, SOLUTION INTRAVENOUS at 11:13

## 2019-10-17 RX ADMIN — METOPROLOL TARTRATE 5 MG: 5 INJECTION INTRAVENOUS at 15:33

## 2019-10-17 RX ADMIN — MORPHINE SULFATE 4 MG: 4 INJECTION, SOLUTION INTRAMUSCULAR; INTRAVENOUS at 12:58

## 2019-10-17 RX ADMIN — SODIUM CHLORIDE, PRESERVATIVE FREE 10 ML: 5 INJECTION INTRAVENOUS at 21:21

## 2019-10-17 RX ADMIN — ONDANSETRON 4 MG: 2 INJECTION INTRAMUSCULAR; INTRAVENOUS at 12:57

## 2019-10-17 RX ADMIN — MORPHINE SULFATE 4 MG: 4 INJECTION, SOLUTION INTRAMUSCULAR; INTRAVENOUS at 17:54

## 2019-10-17 ASSESSMENT — PAIN DESCRIPTION - ORIENTATION
ORIENTATION: MID
ORIENTATION: MID
ORIENTATION: RIGHT;LOWER
ORIENTATION: MID
ORIENTATION: RIGHT;LEFT
ORIENTATION: RIGHT;LOWER

## 2019-10-17 ASSESSMENT — PAIN SCALES - GENERAL
PAINLEVEL_OUTOF10: 5
PAINLEVEL_OUTOF10: 4
PAINLEVEL_OUTOF10: 10
PAINLEVEL_OUTOF10: 4
PAINLEVEL_OUTOF10: 5
PAINLEVEL_OUTOF10: 10
PAINLEVEL_OUTOF10: 8
PAINLEVEL_OUTOF10: 4
PAINLEVEL_OUTOF10: 4
PAINLEVEL_OUTOF10: 8

## 2019-10-17 ASSESSMENT — PAIN DESCRIPTION - PROGRESSION
CLINICAL_PROGRESSION: NOT CHANGED
CLINICAL_PROGRESSION: GRADUALLY WORSENING
CLINICAL_PROGRESSION: NOT CHANGED
CLINICAL_PROGRESSION: NOT CHANGED

## 2019-10-17 ASSESSMENT — PAIN DESCRIPTION - PAIN TYPE
TYPE: ACUTE PAIN

## 2019-10-17 ASSESSMENT — ENCOUNTER SYMPTOMS
NAUSEA: 1
SHORTNESS OF BREATH: 0
ABDOMINAL PAIN: 1
BACK PAIN: 0
DIARRHEA: 0
CONSTIPATION: 1
COLOR CHANGE: 0
COUGH: 0
SORE THROAT: 0
TROUBLE SWALLOWING: 0
VOMITING: 1

## 2019-10-17 ASSESSMENT — PAIN DESCRIPTION - ONSET
ONSET: ON-GOING
ONSET: AWAKENED FROM SLEEP
ONSET: ON-GOING
ONSET: ON-GOING

## 2019-10-17 ASSESSMENT — PAIN DESCRIPTION - LOCATION
LOCATION: ABDOMEN

## 2019-10-17 ASSESSMENT — PAIN - FUNCTIONAL ASSESSMENT
PAIN_FUNCTIONAL_ASSESSMENT: ACTIVITIES ARE NOT PREVENTED
PAIN_FUNCTIONAL_ASSESSMENT: PREVENTS OR INTERFERES SOME ACTIVE ACTIVITIES AND ADLS

## 2019-10-17 ASSESSMENT — PAIN DESCRIPTION - FREQUENCY
FREQUENCY: CONTINUOUS

## 2019-10-17 ASSESSMENT — PAIN DESCRIPTION - DESCRIPTORS
DESCRIPTORS: SHARP

## 2019-10-18 ENCOUNTER — APPOINTMENT (OUTPATIENT)
Dept: MRI IMAGING | Age: 69
DRG: 414 | End: 2019-10-18
Payer: MEDICARE

## 2019-10-18 LAB
ALBUMIN SERPL-MCNC: 3.4 G/DL (ref 3.5–5.1)
ALP BLD-CCNC: 100 U/L (ref 38–126)
ALT SERPL-CCNC: 228 U/L (ref 11–66)
ANION GAP SERPL CALCULATED.3IONS-SCNC: 13 MEQ/L (ref 8–16)
ANION GAP SERPL CALCULATED.3IONS-SCNC: 13 MEQ/L (ref 8–16)
AST SERPL-CCNC: 206 U/L (ref 5–40)
BASOPHILS # BLD: 0.1 %
BASOPHILS ABSOLUTE: 0 THOU/MM3 (ref 0–0.1)
BILIRUB SERPL-MCNC: 4.8 MG/DL (ref 0.3–1.2)
BILIRUBIN DIRECT: 4.3 MG/DL (ref 0–0.3)
BUN BLDV-MCNC: 17 MG/DL (ref 7–22)
BUN BLDV-MCNC: 21 MG/DL (ref 7–22)
CALCIUM SERPL-MCNC: 9.3 MG/DL (ref 8.5–10.5)
CALCIUM SERPL-MCNC: 9.4 MG/DL (ref 8.5–10.5)
CHLORIDE BLD-SCNC: 100 MEQ/L (ref 98–111)
CHLORIDE BLD-SCNC: 100 MEQ/L (ref 98–111)
CO2: 24 MEQ/L (ref 23–33)
CO2: 28 MEQ/L (ref 23–33)
CREAT SERPL-MCNC: 0.8 MG/DL (ref 0.4–1.2)
CREAT SERPL-MCNC: 0.8 MG/DL (ref 0.4–1.2)
EOSINOPHIL # BLD: 0 %
EOSINOPHILS ABSOLUTE: 0 THOU/MM3 (ref 0–0.4)
ERYTHROCYTE [DISTWIDTH] IN BLOOD BY AUTOMATED COUNT: 14.7 % (ref 11.5–14.5)
ERYTHROCYTE [DISTWIDTH] IN BLOOD BY AUTOMATED COUNT: 52.4 FL (ref 35–45)
GFR SERPL CREATININE-BSD FRML MDRD: > 90 ML/MIN/1.73M2
GFR SERPL CREATININE-BSD FRML MDRD: > 90 ML/MIN/1.73M2
GLUCOSE BLD-MCNC: 134 MG/DL (ref 70–108)
GLUCOSE BLD-MCNC: 139 MG/DL (ref 70–108)
GLUCOSE BLD-MCNC: 142 MG/DL (ref 70–108)
GLUCOSE BLD-MCNC: 154 MG/DL (ref 70–108)
GLUCOSE BLD-MCNC: 156 MG/DL (ref 70–108)
HCT VFR BLD CALC: 47.8 % (ref 42–52)
HEMOGLOBIN: 15.4 GM/DL (ref 14–18)
IMMATURE GRANS (ABS): 0.08 THOU/MM3 (ref 0–0.07)
IMMATURE GRANULOCYTES: 0.6 %
LIPASE: 2216.7 U/L (ref 5.6–51.3)
LV EF: 60 %
LVEF MODALITY: NORMAL
LYMPHOCYTES # BLD: 4.7 %
LYMPHOCYTES ABSOLUTE: 0.7 THOU/MM3 (ref 1–4.8)
MCH RBC QN AUTO: 31.2 PG (ref 26–33)
MCHC RBC AUTO-ENTMCNC: 32.2 GM/DL (ref 32.2–35.5)
MCV RBC AUTO: 97 FL (ref 80–94)
MONOCYTES # BLD: 4.7 %
MONOCYTES ABSOLUTE: 0.7 THOU/MM3 (ref 0.4–1.3)
NUCLEATED RED BLOOD CELLS: 0 /100 WBC
PLATELET # BLD: 186 THOU/MM3 (ref 130–400)
PMV BLD AUTO: 10.1 FL (ref 9.4–12.4)
POTASSIUM REFLEX MAGNESIUM: 3.9 MEQ/L (ref 3.5–5.2)
POTASSIUM REFLEX MAGNESIUM: 4.5 MEQ/L (ref 3.5–5.2)
RBC # BLD: 4.93 MILL/MM3 (ref 4.7–6.1)
SEG NEUTROPHILS: 89.9 %
SEGMENTED NEUTROPHILS ABSOLUTE COUNT: 12.6 THOU/MM3 (ref 1.8–7.7)
SODIUM BLD-SCNC: 137 MEQ/L (ref 135–145)
SODIUM BLD-SCNC: 141 MEQ/L (ref 135–145)
TOTAL PROTEIN: 6.2 G/DL (ref 6.1–8)
WBC # BLD: 14 THOU/MM3 (ref 4.8–10.8)

## 2019-10-18 PROCEDURE — 6370000000 HC RX 637 (ALT 250 FOR IP): Performed by: NURSE PRACTITIONER

## 2019-10-18 PROCEDURE — 36415 COLL VENOUS BLD VENIPUNCTURE: CPT

## 2019-10-18 PROCEDURE — 74183 MRI ABD W/O CNTR FLWD CNTR: CPT

## 2019-10-18 PROCEDURE — 83690 ASSAY OF LIPASE: CPT

## 2019-10-18 PROCEDURE — 2580000003 HC RX 258: Performed by: NURSE PRACTITIONER

## 2019-10-18 PROCEDURE — 82948 REAGENT STRIP/BLOOD GLUCOSE: CPT

## 2019-10-18 PROCEDURE — 80076 HEPATIC FUNCTION PANEL: CPT

## 2019-10-18 PROCEDURE — 90686 IIV4 VACC NO PRSV 0.5 ML IM: CPT | Performed by: NURSE PRACTITIONER

## 2019-10-18 PROCEDURE — 99232 SBSQ HOSP IP/OBS MODERATE 35: CPT | Performed by: NURSE PRACTITIONER

## 2019-10-18 PROCEDURE — 2060000000 HC ICU INTERMEDIATE R&B

## 2019-10-18 PROCEDURE — 99221 1ST HOSP IP/OBS SF/LOW 40: CPT | Performed by: SURGERY

## 2019-10-18 PROCEDURE — 85025 COMPLETE CBC W/AUTO DIFF WBC: CPT

## 2019-10-18 PROCEDURE — 2700000000 HC OXYGEN THERAPY PER DAY

## 2019-10-18 PROCEDURE — G0008 ADMIN INFLUENZA VIRUS VAC: HCPCS | Performed by: NURSE PRACTITIONER

## 2019-10-18 PROCEDURE — A9579 GAD-BASE MR CONTRAST NOS,1ML: HCPCS | Performed by: NURSE PRACTITIONER

## 2019-10-18 PROCEDURE — 93306 TTE W/DOPPLER COMPLETE: CPT

## 2019-10-18 PROCEDURE — 6360000004 HC RX CONTRAST MEDICATION: Performed by: NURSE PRACTITIONER

## 2019-10-18 PROCEDURE — 94760 N-INVAS EAR/PLS OXIMETRY 1: CPT

## 2019-10-18 PROCEDURE — 6360000002 HC RX W HCPCS: Performed by: NURSE PRACTITIONER

## 2019-10-18 PROCEDURE — 80048 BASIC METABOLIC PNL TOTAL CA: CPT

## 2019-10-18 PROCEDURE — 2709999900 HC NON-CHARGEABLE SUPPLY

## 2019-10-18 RX ORDER — ONDANSETRON 2 MG/ML
4 INJECTION INTRAMUSCULAR; INTRAVENOUS EVERY 6 HOURS PRN
Status: DISCONTINUED | OUTPATIENT
Start: 2019-10-18 | End: 2019-10-21 | Stop reason: SDUPTHER

## 2019-10-18 RX ORDER — SODIUM CHLORIDE 9 MG/ML
INJECTION, SOLUTION INTRAVENOUS CONTINUOUS
Status: DISCONTINUED | OUTPATIENT
Start: 2019-10-18 | End: 2019-10-18

## 2019-10-18 RX ORDER — DOCUSATE SODIUM 100 MG/1
100 CAPSULE, LIQUID FILLED ORAL 2 TIMES DAILY
Status: DISCONTINUED | OUTPATIENT
Start: 2019-10-18 | End: 2019-10-28 | Stop reason: HOSPADM

## 2019-10-18 RX ORDER — SODIUM CHLORIDE, SODIUM LACTATE, POTASSIUM CHLORIDE, CALCIUM CHLORIDE 600; 310; 30; 20 MG/100ML; MG/100ML; MG/100ML; MG/100ML
INJECTION, SOLUTION INTRAVENOUS ONCE
Status: COMPLETED | OUTPATIENT
Start: 2019-10-18 | End: 2019-10-18

## 2019-10-18 RX ORDER — POLYETHYLENE GLYCOL 3350 17 G/17G
17 POWDER, FOR SOLUTION ORAL DAILY
Status: DISCONTINUED | OUTPATIENT
Start: 2019-10-18 | End: 2019-10-28 | Stop reason: HOSPADM

## 2019-10-18 RX ORDER — MORPHINE SULFATE 4 MG/ML
4 INJECTION, SOLUTION INTRAMUSCULAR; INTRAVENOUS
Status: DISCONTINUED | OUTPATIENT
Start: 2019-10-18 | End: 2019-10-21

## 2019-10-18 RX ADMIN — SODIUM CHLORIDE, POTASSIUM CHLORIDE, SODIUM LACTATE AND CALCIUM CHLORIDE: 600; 310; 30; 20 INJECTION, SOLUTION INTRAVENOUS at 22:47

## 2019-10-18 RX ADMIN — MORPHINE SULFATE 4 MG: 4 INJECTION, SOLUTION INTRAMUSCULAR; INTRAVENOUS at 21:43

## 2019-10-18 RX ADMIN — MORPHINE SULFATE 4 MG: 4 INJECTION, SOLUTION INTRAMUSCULAR; INTRAVENOUS at 18:32

## 2019-10-18 RX ADMIN — POLYETHYLENE GLYCOL 3350 17 G: 17 POWDER, FOR SOLUTION ORAL at 17:14

## 2019-10-18 RX ADMIN — MORPHINE SULFATE 2 MG: 2 INJECTION, SOLUTION INTRAMUSCULAR; INTRAVENOUS at 01:12

## 2019-10-18 RX ADMIN — MORPHINE SULFATE 2 MG: 2 INJECTION, SOLUTION INTRAMUSCULAR; INTRAVENOUS at 06:43

## 2019-10-18 RX ADMIN — SODIUM CHLORIDE, POTASSIUM CHLORIDE, SODIUM LACTATE AND CALCIUM CHLORIDE: 600; 310; 30; 20 INJECTION, SOLUTION INTRAVENOUS at 12:36

## 2019-10-18 RX ADMIN — ENOXAPARIN SODIUM 40 MG: 40 INJECTION SUBCUTANEOUS at 20:32

## 2019-10-18 RX ADMIN — INSULIN LISPRO 1 UNITS: 100 INJECTION, SOLUTION INTRAVENOUS; SUBCUTANEOUS at 20:39

## 2019-10-18 RX ADMIN — MORPHINE SULFATE 2 MG: 2 INJECTION, SOLUTION INTRAMUSCULAR; INTRAVENOUS at 14:49

## 2019-10-18 RX ADMIN — INFLUENZA A VIRUS A/BRISBANE/02/2018 IVR-190 (H1N1) ANTIGEN (PROPIOLACTONE INACTIVATED), INFLUENZA A VIRUS A/KANSAS/14/2017 X-327 (H3N2) ANTIGEN (PROPIOLACTONE INACTIVATED), INFLUENZA B VIRUS B/MARYLAND/15/2016 ANTIGEN (PROPIOLACTONE INACTIVATED), INFLUENZA B VIRUS B/PHUKET/3073/2013 BVR-1B ANTIGEN (PROPIOLACTONE INACTIVATED) 0.5 ML: 15; 15; 15; 15 INJECTION, SUSPENSION INTRAMUSCULAR at 08:37

## 2019-10-18 RX ADMIN — DOCUSATE SODIUM 100 MG: 100 CAPSULE, LIQUID FILLED ORAL at 11:32

## 2019-10-18 RX ADMIN — GADOTERIDOL 20 ML: 279.3 INJECTION, SOLUTION INTRAVENOUS at 17:00

## 2019-10-18 RX ADMIN — DOCUSATE SODIUM 100 MG: 100 CAPSULE, LIQUID FILLED ORAL at 20:32

## 2019-10-18 RX ADMIN — METOPROLOL SUCCINATE 25 MG: 25 TABLET, FILM COATED, EXTENDED RELEASE ORAL at 08:21

## 2019-10-18 RX ADMIN — SODIUM CHLORIDE, POTASSIUM CHLORIDE, SODIUM LACTATE AND CALCIUM CHLORIDE: 600; 310; 30; 20 INJECTION, SOLUTION INTRAVENOUS at 07:09

## 2019-10-18 RX ADMIN — SODIUM CHLORIDE, POTASSIUM CHLORIDE, SODIUM LACTATE AND CALCIUM CHLORIDE: 600; 310; 30; 20 INJECTION, SOLUTION INTRAVENOUS at 19:10

## 2019-10-18 RX ADMIN — MORPHINE SULFATE 2 MG: 2 INJECTION, SOLUTION INTRAMUSCULAR; INTRAVENOUS at 11:23

## 2019-10-18 RX ADMIN — SODIUM CHLORIDE, POTASSIUM CHLORIDE, SODIUM LACTATE AND CALCIUM CHLORIDE: 600; 310; 30; 20 INJECTION, SOLUTION INTRAVENOUS at 17:18

## 2019-10-18 ASSESSMENT — PAIN DESCRIPTION - FREQUENCY
FREQUENCY: INTERMITTENT
FREQUENCY: CONTINUOUS
FREQUENCY: INTERMITTENT
FREQUENCY: CONTINUOUS

## 2019-10-18 ASSESSMENT — PAIN SCALES - GENERAL
PAINLEVEL_OUTOF10: 6
PAINLEVEL_OUTOF10: 4
PAINLEVEL_OUTOF10: 4
PAINLEVEL_OUTOF10: 5
PAINLEVEL_OUTOF10: 5
PAINLEVEL_OUTOF10: 4
PAINLEVEL_OUTOF10: 0
PAINLEVEL_OUTOF10: 2
PAINLEVEL_OUTOF10: 5
PAINLEVEL_OUTOF10: 6
PAINLEVEL_OUTOF10: 5
PAINLEVEL_OUTOF10: 4

## 2019-10-18 ASSESSMENT — PAIN DESCRIPTION - LOCATION
LOCATION: ABDOMEN

## 2019-10-18 ASSESSMENT — PAIN DESCRIPTION - PAIN TYPE
TYPE: ACUTE PAIN

## 2019-10-18 ASSESSMENT — PAIN DESCRIPTION - ONSET
ONSET: ON-GOING
ONSET: GRADUAL
ONSET: GRADUAL
ONSET: ON-GOING

## 2019-10-18 ASSESSMENT — PAIN DESCRIPTION - ORIENTATION
ORIENTATION: RIGHT
ORIENTATION: RIGHT
ORIENTATION: RIGHT;UPPER
ORIENTATION: RIGHT

## 2019-10-18 ASSESSMENT — PAIN DESCRIPTION - DESCRIPTORS
DESCRIPTORS: SHARP

## 2019-10-18 ASSESSMENT — PAIN - FUNCTIONAL ASSESSMENT
PAIN_FUNCTIONAL_ASSESSMENT: ACTIVITIES ARE NOT PREVENTED
PAIN_FUNCTIONAL_ASSESSMENT: PREVENTS OR INTERFERES SOME ACTIVE ACTIVITIES AND ADLS
PAIN_FUNCTIONAL_ASSESSMENT: ACTIVITIES ARE NOT PREVENTED
PAIN_FUNCTIONAL_ASSESSMENT: PREVENTS OR INTERFERES SOME ACTIVE ACTIVITIES AND ADLS
PAIN_FUNCTIONAL_ASSESSMENT: ACTIVITIES ARE NOT PREVENTED

## 2019-10-18 ASSESSMENT — PAIN DESCRIPTION - PROGRESSION
CLINICAL_PROGRESSION: GRADUALLY IMPROVING
CLINICAL_PROGRESSION: GRADUALLY WORSENING
CLINICAL_PROGRESSION: GRADUALLY WORSENING
CLINICAL_PROGRESSION: GRADUALLY IMPROVING
CLINICAL_PROGRESSION: GRADUALLY WORSENING
CLINICAL_PROGRESSION: GRADUALLY WORSENING
CLINICAL_PROGRESSION: GRADUALLY IMPROVING

## 2019-10-19 ENCOUNTER — APPOINTMENT (OUTPATIENT)
Dept: GENERAL RADIOLOGY | Age: 69
DRG: 414 | End: 2019-10-19
Payer: MEDICARE

## 2019-10-19 LAB
ALBUMIN SERPL-MCNC: 2.6 G/DL (ref 3.5–5.1)
ALP BLD-CCNC: 111 U/L (ref 38–126)
ALT SERPL-CCNC: 115 U/L (ref 11–66)
ANION GAP SERPL CALCULATED.3IONS-SCNC: 11 MEQ/L (ref 8–16)
AST SERPL-CCNC: 66 U/L (ref 5–40)
BACTERIA: ABNORMAL /HPF
BASOPHILS # BLD: 0.1 %
BASOPHILS ABSOLUTE: 0 THOU/MM3 (ref 0–0.1)
BILIRUB SERPL-MCNC: 4.7 MG/DL (ref 0.3–1.2)
BILIRUBIN DIRECT: 4.3 MG/DL (ref 0–0.3)
BILIRUBIN URINE: ABNORMAL
BLOOD, URINE: ABNORMAL
BUN BLDV-MCNC: 16 MG/DL (ref 7–22)
CALCIUM SERPL-MCNC: 9 MG/DL (ref 8.5–10.5)
CASTS 2: ABNORMAL /LPF
CASTS UA: ABNORMAL /LPF
CHARACTER, URINE: ABNORMAL
CHLORIDE BLD-SCNC: 105 MEQ/L (ref 98–111)
CO2: 25 MEQ/L (ref 23–33)
COLOR: ABNORMAL
CREAT SERPL-MCNC: 0.7 MG/DL (ref 0.4–1.2)
CRYSTALS, UA: ABNORMAL
EOSINOPHIL # BLD: 0 %
EOSINOPHILS ABSOLUTE: 0 THOU/MM3 (ref 0–0.4)
EPITHELIAL CELLS, UA: ABNORMAL /HPF
ERYTHROCYTE [DISTWIDTH] IN BLOOD BY AUTOMATED COUNT: 14.8 % (ref 11.5–14.5)
ERYTHROCYTE [DISTWIDTH] IN BLOOD BY AUTOMATED COUNT: 53.1 FL (ref 35–45)
GFR SERPL CREATININE-BSD FRML MDRD: > 90 ML/MIN/1.73M2
GLUCOSE BLD-MCNC: 126 MG/DL (ref 70–108)
GLUCOSE BLD-MCNC: 148 MG/DL (ref 70–108)
GLUCOSE URINE: NEGATIVE MG/DL
HCT VFR BLD CALC: 44.9 % (ref 42–52)
HEMOGLOBIN: 14.2 GM/DL (ref 14–18)
ICTOTEST: POSITIVE
IMMATURE GRANS (ABS): 0.11 THOU/MM3 (ref 0–0.07)
IMMATURE GRANULOCYTES: 0.8 %
KETONES, URINE: 15
LACTIC ACID, SEPSIS: 2 MMOL/L (ref 0.5–1.9)
LACTIC ACID: 1.6 MMOL/L (ref 0.5–2.2)
LEUKOCYTE ESTERASE, URINE: ABNORMAL
LIPASE: 482.6 U/L (ref 5.6–51.3)
LYMPHOCYTES # BLD: 3.7 %
LYMPHOCYTES ABSOLUTE: 0.5 THOU/MM3 (ref 1–4.8)
MCH RBC QN AUTO: 30.6 PG (ref 26–33)
MCHC RBC AUTO-ENTMCNC: 31.6 GM/DL (ref 32.2–35.5)
MCV RBC AUTO: 96.8 FL (ref 80–94)
MISCELLANEOUS 2: ABNORMAL
MONOCYTES # BLD: 5.5 %
MONOCYTES ABSOLUTE: 0.7 THOU/MM3 (ref 0.4–1.3)
NITRITE, URINE: POSITIVE
NUCLEATED RED BLOOD CELLS: 0 /100 WBC
PH UA: 6 (ref 5–9)
PLATELET # BLD: 146 THOU/MM3 (ref 130–400)
PMV BLD AUTO: 9.9 FL (ref 9.4–12.4)
POTASSIUM REFLEX MAGNESIUM: 4 MEQ/L (ref 3.5–5.2)
PROTEIN UA: 100
RBC # BLD: 4.64 MILL/MM3 (ref 4.7–6.1)
RBC URINE: ABNORMAL /HPF
RENAL EPITHELIAL, UA: ABNORMAL
SEG NEUTROPHILS: 89.9 %
SEGMENTED NEUTROPHILS ABSOLUTE COUNT: 12 THOU/MM3 (ref 1.8–7.7)
SODIUM BLD-SCNC: 141 MEQ/L (ref 135–145)
SPECIFIC GRAVITY, URINE: > 1.03 (ref 1–1.03)
TOTAL PROTEIN: 5.6 G/DL (ref 6.1–8)
UROBILINOGEN, URINE: 1 EU/DL (ref 0–1)
WBC # BLD: 13.4 THOU/MM3 (ref 4.8–10.8)
WBC UA: ABNORMAL /HPF
YEAST: ABNORMAL

## 2019-10-19 PROCEDURE — 85025 COMPLETE CBC W/AUTO DIFF WBC: CPT

## 2019-10-19 PROCEDURE — 2580000003 HC RX 258: Performed by: NURSE PRACTITIONER

## 2019-10-19 PROCEDURE — 36415 COLL VENOUS BLD VENIPUNCTURE: CPT

## 2019-10-19 PROCEDURE — 99233 SBSQ HOSP IP/OBS HIGH 50: CPT | Performed by: SURGERY

## 2019-10-19 PROCEDURE — 99233 SBSQ HOSP IP/OBS HIGH 50: CPT | Performed by: NURSE PRACTITIONER

## 2019-10-19 PROCEDURE — 81001 URINALYSIS AUTO W/SCOPE: CPT

## 2019-10-19 PROCEDURE — 6370000000 HC RX 637 (ALT 250 FOR IP): Performed by: NURSE PRACTITIONER

## 2019-10-19 PROCEDURE — 80076 HEPATIC FUNCTION PANEL: CPT

## 2019-10-19 PROCEDURE — 2060000000 HC ICU INTERMEDIATE R&B

## 2019-10-19 PROCEDURE — 87040 BLOOD CULTURE FOR BACTERIA: CPT

## 2019-10-19 PROCEDURE — 2709999900 HC NON-CHARGEABLE SUPPLY

## 2019-10-19 PROCEDURE — 83690 ASSAY OF LIPASE: CPT

## 2019-10-19 PROCEDURE — 82948 REAGENT STRIP/BLOOD GLUCOSE: CPT

## 2019-10-19 PROCEDURE — 87086 URINE CULTURE/COLONY COUNT: CPT

## 2019-10-19 PROCEDURE — 80048 BASIC METABOLIC PNL TOTAL CA: CPT

## 2019-10-19 PROCEDURE — 6360000002 HC RX W HCPCS: Performed by: NURSE PRACTITIONER

## 2019-10-19 PROCEDURE — 71046 X-RAY EXAM CHEST 2 VIEWS: CPT

## 2019-10-19 PROCEDURE — 2700000000 HC OXYGEN THERAPY PER DAY

## 2019-10-19 PROCEDURE — 94760 N-INVAS EAR/PLS OXIMETRY 1: CPT

## 2019-10-19 PROCEDURE — 83605 ASSAY OF LACTIC ACID: CPT

## 2019-10-19 RX ORDER — METOPROLOL SUCCINATE 50 MG/1
50 TABLET, EXTENDED RELEASE ORAL DAILY
Status: DISCONTINUED | OUTPATIENT
Start: 2019-10-20 | End: 2019-10-28 | Stop reason: HOSPADM

## 2019-10-19 RX ORDER — METOPROLOL SUCCINATE 25 MG/1
25 TABLET, EXTENDED RELEASE ORAL ONCE
Status: COMPLETED | OUTPATIENT
Start: 2019-10-19 | End: 2019-10-19

## 2019-10-19 RX ADMIN — SODIUM CHLORIDE, POTASSIUM CHLORIDE, SODIUM LACTATE AND CALCIUM CHLORIDE: 600; 310; 30; 20 INJECTION, SOLUTION INTRAVENOUS at 04:10

## 2019-10-19 RX ADMIN — POLYETHYLENE GLYCOL 3350 17 G: 17 POWDER, FOR SOLUTION ORAL at 08:07

## 2019-10-19 RX ADMIN — SODIUM CHLORIDE, POTASSIUM CHLORIDE, SODIUM LACTATE AND CALCIUM CHLORIDE: 600; 310; 30; 20 INJECTION, SOLUTION INTRAVENOUS at 20:53

## 2019-10-19 RX ADMIN — MORPHINE SULFATE 4 MG: 4 INJECTION, SOLUTION INTRAMUSCULAR; INTRAVENOUS at 02:42

## 2019-10-19 RX ADMIN — ACETAMINOPHEN 650 MG: 325 TABLET ORAL at 10:32

## 2019-10-19 RX ADMIN — SODIUM CHLORIDE, PRESERVATIVE FREE 10 ML: 5 INJECTION INTRAVENOUS at 20:54

## 2019-10-19 RX ADMIN — ENOXAPARIN SODIUM 40 MG: 40 INJECTION SUBCUTANEOUS at 20:45

## 2019-10-19 RX ADMIN — MORPHINE SULFATE 4 MG: 4 INJECTION, SOLUTION INTRAMUSCULAR; INTRAVENOUS at 15:11

## 2019-10-19 RX ADMIN — METOPROLOL SUCCINATE 25 MG: 25 TABLET, FILM COATED, EXTENDED RELEASE ORAL at 08:11

## 2019-10-19 RX ADMIN — SODIUM CHLORIDE, POTASSIUM CHLORIDE, SODIUM LACTATE AND CALCIUM CHLORIDE: 600; 310; 30; 20 INJECTION, SOLUTION INTRAVENOUS at 09:53

## 2019-10-19 RX ADMIN — MORPHINE SULFATE 4 MG: 4 INJECTION, SOLUTION INTRAMUSCULAR; INTRAVENOUS at 23:01

## 2019-10-19 RX ADMIN — DOCUSATE SODIUM 100 MG: 100 CAPSULE, LIQUID FILLED ORAL at 08:07

## 2019-10-19 RX ADMIN — MEROPENEM 1 G: 1 INJECTION, POWDER, FOR SOLUTION INTRAVENOUS at 20:44

## 2019-10-19 RX ADMIN — MORPHINE SULFATE 4 MG: 4 INJECTION, SOLUTION INTRAMUSCULAR; INTRAVENOUS at 11:31

## 2019-10-19 RX ADMIN — MORPHINE SULFATE 4 MG: 4 INJECTION, SOLUTION INTRAMUSCULAR; INTRAVENOUS at 08:07

## 2019-10-19 RX ADMIN — DOCUSATE SODIUM 100 MG: 100 CAPSULE, LIQUID FILLED ORAL at 20:45

## 2019-10-19 RX ADMIN — MEROPENEM 1 G: 1 INJECTION, POWDER, FOR SOLUTION INTRAVENOUS at 11:34

## 2019-10-19 RX ADMIN — LOSARTAN POTASSIUM 50 MG: 50 TABLET, FILM COATED ORAL at 08:11

## 2019-10-19 RX ADMIN — MORPHINE SULFATE 4 MG: 4 INJECTION, SOLUTION INTRAMUSCULAR; INTRAVENOUS at 20:48

## 2019-10-19 RX ADMIN — SODIUM CHLORIDE, POTASSIUM CHLORIDE, SODIUM LACTATE AND CALCIUM CHLORIDE: 600; 310; 30; 20 INJECTION, SOLUTION INTRAVENOUS at 15:14

## 2019-10-19 RX ADMIN — METOPROLOL SUCCINATE 25 MG: 25 TABLET, FILM COATED, EXTENDED RELEASE ORAL at 10:32

## 2019-10-19 RX ADMIN — MORPHINE SULFATE 4 MG: 4 INJECTION, SOLUTION INTRAMUSCULAR; INTRAVENOUS at 18:36

## 2019-10-19 ASSESSMENT — PAIN DESCRIPTION - ORIENTATION
ORIENTATION: RIGHT

## 2019-10-19 ASSESSMENT — PAIN SCALES - GENERAL
PAINLEVEL_OUTOF10: 2
PAINLEVEL_OUTOF10: 6
PAINLEVEL_OUTOF10: 4
PAINLEVEL_OUTOF10: 4
PAINLEVEL_OUTOF10: 6
PAINLEVEL_OUTOF10: 4
PAINLEVEL_OUTOF10: 5
PAINLEVEL_OUTOF10: 4
PAINLEVEL_OUTOF10: 4
PAINLEVEL_OUTOF10: 6
PAINLEVEL_OUTOF10: 4
PAINLEVEL_OUTOF10: 6
PAINLEVEL_OUTOF10: 2
PAINLEVEL_OUTOF10: 3
PAINLEVEL_OUTOF10: 4
PAINLEVEL_OUTOF10: 4

## 2019-10-19 ASSESSMENT — PAIN DESCRIPTION - DESCRIPTORS
DESCRIPTORS: SHARP

## 2019-10-19 ASSESSMENT — PAIN DESCRIPTION - FREQUENCY
FREQUENCY: INTERMITTENT

## 2019-10-19 ASSESSMENT — PAIN DESCRIPTION - PROGRESSION
CLINICAL_PROGRESSION: GRADUALLY IMPROVING
CLINICAL_PROGRESSION: GRADUALLY WORSENING
CLINICAL_PROGRESSION: GRADUALLY IMPROVING
CLINICAL_PROGRESSION: GRADUALLY WORSENING
CLINICAL_PROGRESSION: GRADUALLY WORSENING
CLINICAL_PROGRESSION: GRADUALLY IMPROVING
CLINICAL_PROGRESSION: NOT CHANGED
CLINICAL_PROGRESSION: GRADUALLY WORSENING
CLINICAL_PROGRESSION: GRADUALLY IMPROVING
CLINICAL_PROGRESSION: GRADUALLY WORSENING

## 2019-10-19 ASSESSMENT — ENCOUNTER SYMPTOMS
COUGH: 0
EYE REDNESS: 0
VOMITING: 0
NAUSEA: 1
CHEST TIGHTNESS: 0
ROS SKIN COMMENTS: SCLERA ANICTERIC
ABDOMINAL DISTENTION: 1
PHOTOPHOBIA: 0
TROUBLE SWALLOWING: 0
ABDOMINAL PAIN: 1
BACK PAIN: 1
SHORTNESS OF BREATH: 0
COLOR CHANGE: 1
DIARRHEA: 0

## 2019-10-19 ASSESSMENT — PAIN - FUNCTIONAL ASSESSMENT
PAIN_FUNCTIONAL_ASSESSMENT: PREVENTS OR INTERFERES SOME ACTIVE ACTIVITIES AND ADLS
PAIN_FUNCTIONAL_ASSESSMENT: ACTIVITIES ARE NOT PREVENTED

## 2019-10-19 ASSESSMENT — PAIN DESCRIPTION - LOCATION
LOCATION: ABDOMEN

## 2019-10-19 ASSESSMENT — PAIN DESCRIPTION - PAIN TYPE
TYPE: ACUTE PAIN

## 2019-10-19 ASSESSMENT — PAIN DESCRIPTION - ONSET
ONSET: ON-GOING

## 2019-10-20 LAB
ALBUMIN SERPL-MCNC: 2.7 G/DL (ref 3.5–5.1)
ALP BLD-CCNC: 124 U/L (ref 38–126)
ALT SERPL-CCNC: 74 U/L (ref 11–66)
ANION GAP SERPL CALCULATED.3IONS-SCNC: 13 MEQ/L (ref 8–16)
AST SERPL-CCNC: 45 U/L (ref 5–40)
BASOPHILS # BLD: 0.2 %
BASOPHILS ABSOLUTE: 0 THOU/MM3 (ref 0–0.1)
BILIRUB SERPL-MCNC: 4.1 MG/DL (ref 0.3–1.2)
BILIRUBIN DIRECT: 3.3 MG/DL (ref 0–0.3)
BUN BLDV-MCNC: 17 MG/DL (ref 7–22)
CALCIUM SERPL-MCNC: 9.3 MG/DL (ref 8.5–10.5)
CHLORIDE BLD-SCNC: 102 MEQ/L (ref 98–111)
CO2: 24 MEQ/L (ref 23–33)
CREAT SERPL-MCNC: 0.6 MG/DL (ref 0.4–1.2)
EOSINOPHIL # BLD: 0.1 %
EOSINOPHILS ABSOLUTE: 0 THOU/MM3 (ref 0–0.4)
ERYTHROCYTE [DISTWIDTH] IN BLOOD BY AUTOMATED COUNT: 15 % (ref 11.5–14.5)
ERYTHROCYTE [DISTWIDTH] IN BLOOD BY AUTOMATED COUNT: 52.9 FL (ref 35–45)
GFR SERPL CREATININE-BSD FRML MDRD: > 90 ML/MIN/1.73M2
GLUCOSE BLD-MCNC: 151 MG/DL (ref 70–108)
HCT VFR BLD CALC: 42 % (ref 42–52)
HEMOGLOBIN: 13.6 GM/DL (ref 14–18)
IMMATURE GRANS (ABS): 0.13 THOU/MM3 (ref 0–0.07)
IMMATURE GRANULOCYTES: 1.1 %
LIPASE: 74.5 U/L (ref 5.6–51.3)
LYMPHOCYTES # BLD: 3.1 %
LYMPHOCYTES ABSOLUTE: 0.4 THOU/MM3 (ref 1–4.8)
MCH RBC QN AUTO: 30.8 PG (ref 26–33)
MCHC RBC AUTO-ENTMCNC: 32.4 GM/DL (ref 32.2–35.5)
MCV RBC AUTO: 95.2 FL (ref 80–94)
MONOCYTES # BLD: 6.6 %
MONOCYTES ABSOLUTE: 0.8 THOU/MM3 (ref 0.4–1.3)
NUCLEATED RED BLOOD CELLS: 0 /100 WBC
PLATELET # BLD: 166 THOU/MM3 (ref 130–400)
PMV BLD AUTO: 10.1 FL (ref 9.4–12.4)
POTASSIUM REFLEX MAGNESIUM: 4 MEQ/L (ref 3.5–5.2)
RBC # BLD: 4.41 MILL/MM3 (ref 4.7–6.1)
SEG NEUTROPHILS: 88.9 %
SEGMENTED NEUTROPHILS ABSOLUTE COUNT: 11 THOU/MM3 (ref 1.8–7.7)
SODIUM BLD-SCNC: 139 MEQ/L (ref 135–145)
TOTAL PROTEIN: 6 G/DL (ref 6.1–8)
WBC # BLD: 12.4 THOU/MM3 (ref 4.8–10.8)

## 2019-10-20 PROCEDURE — 36415 COLL VENOUS BLD VENIPUNCTURE: CPT

## 2019-10-20 PROCEDURE — 2580000003 HC RX 258: Performed by: NURSE PRACTITIONER

## 2019-10-20 PROCEDURE — 99233 SBSQ HOSP IP/OBS HIGH 50: CPT | Performed by: SURGERY

## 2019-10-20 PROCEDURE — 83690 ASSAY OF LIPASE: CPT

## 2019-10-20 PROCEDURE — 80048 BASIC METABOLIC PNL TOTAL CA: CPT

## 2019-10-20 PROCEDURE — 2060000000 HC ICU INTERMEDIATE R&B

## 2019-10-20 PROCEDURE — 6360000002 HC RX W HCPCS: Performed by: NURSE PRACTITIONER

## 2019-10-20 PROCEDURE — 80076 HEPATIC FUNCTION PANEL: CPT

## 2019-10-20 PROCEDURE — 6370000000 HC RX 637 (ALT 250 FOR IP): Performed by: NURSE PRACTITIONER

## 2019-10-20 PROCEDURE — 85025 COMPLETE CBC W/AUTO DIFF WBC: CPT

## 2019-10-20 PROCEDURE — 99233 SBSQ HOSP IP/OBS HIGH 50: CPT | Performed by: NURSE PRACTITIONER

## 2019-10-20 PROCEDURE — 2709999900 HC NON-CHARGEABLE SUPPLY

## 2019-10-20 RX ORDER — BISACODYL 10 MG
10 SUPPOSITORY, RECTAL RECTAL DAILY PRN
Status: DISCONTINUED | OUTPATIENT
Start: 2019-10-20 | End: 2019-10-28 | Stop reason: HOSPADM

## 2019-10-20 RX ORDER — METOPROLOL TARTRATE 5 MG/5ML
5 INJECTION INTRAVENOUS EVERY 6 HOURS PRN
Status: DISCONTINUED | OUTPATIENT
Start: 2019-10-20 | End: 2019-10-28 | Stop reason: HOSPADM

## 2019-10-20 RX ADMIN — MORPHINE SULFATE 4 MG: 4 INJECTION, SOLUTION INTRAMUSCULAR; INTRAVENOUS at 18:57

## 2019-10-20 RX ADMIN — MORPHINE SULFATE 4 MG: 4 INJECTION, SOLUTION INTRAMUSCULAR; INTRAVENOUS at 15:59

## 2019-10-20 RX ADMIN — DOCUSATE SODIUM 100 MG: 100 CAPSULE, LIQUID FILLED ORAL at 07:43

## 2019-10-20 RX ADMIN — ACETAMINOPHEN 650 MG: 325 TABLET ORAL at 13:13

## 2019-10-20 RX ADMIN — SODIUM CHLORIDE, PRESERVATIVE FREE 10 ML: 5 INJECTION INTRAVENOUS at 07:44

## 2019-10-20 RX ADMIN — METOPROLOL SUCCINATE 50 MG: 50 TABLET, EXTENDED RELEASE ORAL at 07:44

## 2019-10-20 RX ADMIN — DOCUSATE SODIUM 100 MG: 100 CAPSULE, LIQUID FILLED ORAL at 20:15

## 2019-10-20 RX ADMIN — MORPHINE SULFATE 4 MG: 4 INJECTION, SOLUTION INTRAMUSCULAR; INTRAVENOUS at 13:01

## 2019-10-20 RX ADMIN — ENOXAPARIN SODIUM 40 MG: 40 INJECTION SUBCUTANEOUS at 20:16

## 2019-10-20 RX ADMIN — SODIUM CHLORIDE, POTASSIUM CHLORIDE, SODIUM LACTATE AND CALCIUM CHLORIDE: 600; 310; 30; 20 INJECTION, SOLUTION INTRAVENOUS at 03:00

## 2019-10-20 RX ADMIN — BISACODYL 10 MG: 10 SUPPOSITORY RECTAL at 13:05

## 2019-10-20 RX ADMIN — MORPHINE SULFATE 4 MG: 4 INJECTION, SOLUTION INTRAMUSCULAR; INTRAVENOUS at 07:43

## 2019-10-20 RX ADMIN — MEROPENEM 1 G: 1 INJECTION, POWDER, FOR SOLUTION INTRAVENOUS at 18:58

## 2019-10-20 RX ADMIN — MEROPENEM 1 G: 1 INJECTION, POWDER, FOR SOLUTION INTRAVENOUS at 12:59

## 2019-10-20 RX ADMIN — SODIUM CHLORIDE, POTASSIUM CHLORIDE, SODIUM LACTATE AND CALCIUM CHLORIDE: 600; 310; 30; 20 INJECTION, SOLUTION INTRAVENOUS at 09:00

## 2019-10-20 RX ADMIN — SODIUM CHLORIDE, PRESERVATIVE FREE 10 ML: 5 INJECTION INTRAVENOUS at 20:16

## 2019-10-20 RX ADMIN — POLYETHYLENE GLYCOL 3350 17 G: 17 POWDER, FOR SOLUTION ORAL at 07:43

## 2019-10-20 RX ADMIN — LOSARTAN POTASSIUM 50 MG: 50 TABLET, FILM COATED ORAL at 07:44

## 2019-10-20 RX ADMIN — MEROPENEM 1 G: 1 INJECTION, POWDER, FOR SOLUTION INTRAVENOUS at 04:29

## 2019-10-20 ASSESSMENT — ENCOUNTER SYMPTOMS
CHEST TIGHTNESS: 0
DIARRHEA: 0
VOMITING: 0
NAUSEA: 1
ABDOMINAL DISTENTION: 1
PHOTOPHOBIA: 0
BACK PAIN: 1
COUGH: 0
COLOR CHANGE: 1
ROS SKIN COMMENTS: SCLERA ANICTERIC
SHORTNESS OF BREATH: 0
TROUBLE SWALLOWING: 0
EYE REDNESS: 0
ABDOMINAL PAIN: 1

## 2019-10-20 ASSESSMENT — PAIN DESCRIPTION - DESCRIPTORS
DESCRIPTORS: SHARP

## 2019-10-20 ASSESSMENT — PAIN - FUNCTIONAL ASSESSMENT
PAIN_FUNCTIONAL_ASSESSMENT: PREVENTS OR INTERFERES SOME ACTIVE ACTIVITIES AND ADLS

## 2019-10-20 ASSESSMENT — PAIN DESCRIPTION - PROGRESSION
CLINICAL_PROGRESSION: NOT CHANGED
CLINICAL_PROGRESSION: GRADUALLY WORSENING
CLINICAL_PROGRESSION: GRADUALLY WORSENING
CLINICAL_PROGRESSION: NOT CHANGED

## 2019-10-20 ASSESSMENT — PAIN DESCRIPTION - PAIN TYPE
TYPE: ACUTE PAIN

## 2019-10-20 ASSESSMENT — PAIN DESCRIPTION - ONSET
ONSET: GRADUAL
ONSET: ON-GOING

## 2019-10-20 ASSESSMENT — PAIN SCALES - GENERAL
PAINLEVEL_OUTOF10: 4
PAINLEVEL_OUTOF10: 5
PAINLEVEL_OUTOF10: 3
PAINLEVEL_OUTOF10: 3
PAINLEVEL_OUTOF10: 4

## 2019-10-20 ASSESSMENT — PAIN DESCRIPTION - ORIENTATION
ORIENTATION: RIGHT

## 2019-10-20 ASSESSMENT — PAIN DESCRIPTION - FREQUENCY
FREQUENCY: INTERMITTENT

## 2019-10-20 ASSESSMENT — PAIN DESCRIPTION - LOCATION
LOCATION: ABDOMEN

## 2019-10-21 ENCOUNTER — ANESTHESIA EVENT (OUTPATIENT)
Dept: OPERATING ROOM | Age: 69
DRG: 414 | End: 2019-10-21
Payer: MEDICARE

## 2019-10-21 ENCOUNTER — ANESTHESIA (OUTPATIENT)
Dept: OPERATING ROOM | Age: 69
DRG: 414 | End: 2019-10-21
Payer: MEDICARE

## 2019-10-21 VITALS
OXYGEN SATURATION: 81 % | TEMPERATURE: 99.5 F | DIASTOLIC BLOOD PRESSURE: 68 MMHG | SYSTOLIC BLOOD PRESSURE: 109 MMHG | RESPIRATION RATE: 3 BRPM

## 2019-10-21 LAB
ABO: NORMAL
ALBUMIN SERPL-MCNC: 2.6 G/DL (ref 3.5–5.1)
ALP BLD-CCNC: 131 U/L (ref 38–126)
ALT SERPL-CCNC: 56 U/L (ref 11–66)
ANION GAP SERPL CALCULATED.3IONS-SCNC: 12 MEQ/L (ref 8–16)
ANTIBODY SCREEN: NORMAL
APTT: 22.9 SECONDS (ref 22–38)
AST SERPL-CCNC: 38 U/L (ref 5–40)
BASOPHILS # BLD: 0.4 %
BASOPHILS ABSOLUTE: 0 THOU/MM3 (ref 0–0.1)
BILIRUB SERPL-MCNC: 3.2 MG/DL (ref 0.3–1.2)
BILIRUBIN DIRECT: 2.6 MG/DL (ref 0–0.3)
BUN BLDV-MCNC: 17 MG/DL (ref 7–22)
CALCIUM SERPL-MCNC: 9.2 MG/DL (ref 8.5–10.5)
CHLORIDE BLD-SCNC: 103 MEQ/L (ref 98–111)
CO2: 26 MEQ/L (ref 23–33)
CREAT SERPL-MCNC: 0.6 MG/DL (ref 0.4–1.2)
EOSINOPHIL # BLD: 0.3 %
EOSINOPHILS ABSOLUTE: 0 THOU/MM3 (ref 0–0.4)
ERYTHROCYTE [DISTWIDTH] IN BLOOD BY AUTOMATED COUNT: 15.2 % (ref 11.5–14.5)
ERYTHROCYTE [DISTWIDTH] IN BLOOD BY AUTOMATED COUNT: 52.7 FL (ref 35–45)
GFR SERPL CREATININE-BSD FRML MDRD: > 90 ML/MIN/1.73M2
GLUCOSE BLD-MCNC: 129 MG/DL (ref 70–108)
HCT VFR BLD CALC: 41.1 % (ref 42–52)
HEMOGLOBIN: 13.4 GM/DL (ref 14–18)
IMMATURE GRANS (ABS): 0.21 THOU/MM3 (ref 0–0.07)
IMMATURE GRANULOCYTES: 1.9 %
INR BLD: 1.13 (ref 0.85–1.13)
LIPASE: 26.3 U/L (ref 5.6–51.3)
LYMPHOCYTES # BLD: 4.4 %
LYMPHOCYTES ABSOLUTE: 0.5 THOU/MM3 (ref 1–4.8)
MCH RBC QN AUTO: 30.6 PG (ref 26–33)
MCHC RBC AUTO-ENTMCNC: 32.6 GM/DL (ref 32.2–35.5)
MCV RBC AUTO: 93.8 FL (ref 80–94)
MONOCYTES # BLD: 7.6 %
MONOCYTES ABSOLUTE: 0.9 THOU/MM3 (ref 0.4–1.3)
NUCLEATED RED BLOOD CELLS: 0 /100 WBC
PLATELET # BLD: 161 THOU/MM3 (ref 130–400)
PMV BLD AUTO: 10.5 FL (ref 9.4–12.4)
POTASSIUM REFLEX MAGNESIUM: 3.7 MEQ/L (ref 3.5–5.2)
RBC # BLD: 4.38 MILL/MM3 (ref 4.7–6.1)
RH FACTOR: NORMAL
SEG NEUTROPHILS: 85.4 %
SEGMENTED NEUTROPHILS ABSOLUTE COUNT: 9.7 THOU/MM3 (ref 1.8–7.7)
SODIUM BLD-SCNC: 141 MEQ/L (ref 135–145)
TOTAL PROTEIN: 6.1 G/DL (ref 6.1–8)
URINE CULTURE REFLEX: NORMAL
WBC # BLD: 11.3 THOU/MM3 (ref 4.8–10.8)

## 2019-10-21 PROCEDURE — 80048 BASIC METABOLIC PNL TOTAL CA: CPT

## 2019-10-21 PROCEDURE — 36415 COLL VENOUS BLD VENIPUNCTURE: CPT

## 2019-10-21 PROCEDURE — 85025 COMPLETE CBC W/AUTO DIFF WBC: CPT

## 2019-10-21 PROCEDURE — 6360000002 HC RX W HCPCS: Performed by: NURSE PRACTITIONER

## 2019-10-21 PROCEDURE — 2780000010 HC IMPLANT OTHER: Performed by: SURGERY

## 2019-10-21 PROCEDURE — 2060000000 HC ICU INTERMEDIATE R&B

## 2019-10-21 PROCEDURE — 2500000003 HC RX 250 WO HCPCS: Performed by: NURSE ANESTHETIST, CERTIFIED REGISTERED

## 2019-10-21 PROCEDURE — 0W3G0ZZ CONTROL BLEEDING IN PERITONEAL CAVITY, OPEN APPROACH: ICD-10-PCS | Performed by: SURGERY

## 2019-10-21 PROCEDURE — 3600000002 HC SURGERY LEVEL 2 BASE: Performed by: SURGERY

## 2019-10-21 PROCEDURE — 47600 CHOLECYSTECTOMY: CPT | Performed by: SURGERY

## 2019-10-21 PROCEDURE — 2580000003 HC RX 258: Performed by: SURGERY

## 2019-10-21 PROCEDURE — 80076 HEPATIC FUNCTION PANEL: CPT

## 2019-10-21 PROCEDURE — 6370000000 HC RX 637 (ALT 250 FOR IP): Performed by: NURSE PRACTITIONER

## 2019-10-21 PROCEDURE — 51798 US URINE CAPACITY MEASURE: CPT

## 2019-10-21 PROCEDURE — 6360000002 HC RX W HCPCS: Performed by: ANESTHESIOLOGY

## 2019-10-21 PROCEDURE — 86850 RBC ANTIBODY SCREEN: CPT

## 2019-10-21 PROCEDURE — 3600000012 HC SURGERY LEVEL 2 ADDTL 15MIN: Performed by: SURGERY

## 2019-10-21 PROCEDURE — 2580000003 HC RX 258: Performed by: NURSE ANESTHETIST, CERTIFIED REGISTERED

## 2019-10-21 PROCEDURE — 86923 COMPATIBILITY TEST ELECTRIC: CPT

## 2019-10-21 PROCEDURE — 7100000001 HC PACU RECOVERY - ADDTL 15 MIN: Performed by: SURGERY

## 2019-10-21 PROCEDURE — 2709999900 HC NON-CHARGEABLE SUPPLY: Performed by: SURGERY

## 2019-10-21 PROCEDURE — 2709999900 HC NON-CHARGEABLE SUPPLY

## 2019-10-21 PROCEDURE — 88304 TISSUE EXAM BY PATHOLOGIST: CPT

## 2019-10-21 PROCEDURE — 6370000000 HC RX 637 (ALT 250 FOR IP): Performed by: SURGERY

## 2019-10-21 PROCEDURE — 2500000003 HC RX 250 WO HCPCS: Performed by: SURGERY

## 2019-10-21 PROCEDURE — 85730 THROMBOPLASTIN TIME PARTIAL: CPT

## 2019-10-21 PROCEDURE — 0WJG4ZZ INSPECTION OF PERITONEAL CAVITY, PERCUTANEOUS ENDOSCOPIC APPROACH: ICD-10-PCS | Performed by: SURGERY

## 2019-10-21 PROCEDURE — 86901 BLOOD TYPING SEROLOGIC RH(D): CPT

## 2019-10-21 PROCEDURE — 99232 SBSQ HOSP IP/OBS MODERATE 35: CPT | Performed by: NURSE PRACTITIONER

## 2019-10-21 PROCEDURE — 83690 ASSAY OF LIPASE: CPT

## 2019-10-21 PROCEDURE — 2700000000 HC OXYGEN THERAPY PER DAY

## 2019-10-21 PROCEDURE — 94760 N-INVAS EAR/PLS OXIMETRY 1: CPT

## 2019-10-21 PROCEDURE — 6360000002 HC RX W HCPCS: Performed by: SURGERY

## 2019-10-21 PROCEDURE — 6360000002 HC RX W HCPCS: Performed by: NURSE ANESTHETIST, CERTIFIED REGISTERED

## 2019-10-21 PROCEDURE — 86900 BLOOD TYPING SEROLOGIC ABO: CPT

## 2019-10-21 PROCEDURE — 0FT40ZZ RESECTION OF GALLBLADDER, OPEN APPROACH: ICD-10-PCS | Performed by: SURGERY

## 2019-10-21 PROCEDURE — 85610 PROTHROMBIN TIME: CPT

## 2019-10-21 PROCEDURE — 7100000000 HC PACU RECOVERY - FIRST 15 MIN: Performed by: SURGERY

## 2019-10-21 PROCEDURE — 3700000000 HC ANESTHESIA ATTENDED CARE: Performed by: SURGERY

## 2019-10-21 PROCEDURE — 3700000001 HC ADD 15 MINUTES (ANESTHESIA): Performed by: SURGERY

## 2019-10-21 PROCEDURE — 2580000003 HC RX 258: Performed by: NURSE PRACTITIONER

## 2019-10-21 DEVICE — AGENT HEMOSTATIC SURGIFLOW MATRIX KIT W/THROMBIN: Type: IMPLANTABLE DEVICE | Site: ABDOMEN | Status: FUNCTIONAL

## 2019-10-21 DEVICE — SEALANT HEMSTAT 5ML HUM FIBRIN THROM 2 VI APPL DEV EVICEL: Type: IMPLANTABLE DEVICE | Site: ABDOMEN | Status: FUNCTIONAL

## 2019-10-21 RX ORDER — GLYCOPYRROLATE 1 MG/5 ML
SYRINGE (ML) INTRAVENOUS PRN
Status: DISCONTINUED | OUTPATIENT
Start: 2019-10-21 | End: 2019-10-21 | Stop reason: SDUPTHER

## 2019-10-21 RX ORDER — MORPHINE SULFATE 2 MG/ML
2 INJECTION, SOLUTION INTRAMUSCULAR; INTRAVENOUS EVERY 5 MIN PRN
Status: DISCONTINUED | OUTPATIENT
Start: 2019-10-21 | End: 2019-10-21 | Stop reason: HOSPADM

## 2019-10-21 RX ORDER — SODIUM CHLORIDE 0.9 % (FLUSH) 0.9 %
10 SYRINGE (ML) INJECTION EVERY 12 HOURS SCHEDULED
Status: DISCONTINUED | OUTPATIENT
Start: 2019-10-21 | End: 2019-10-28 | Stop reason: HOSPADM

## 2019-10-21 RX ORDER — ONDANSETRON 2 MG/ML
INJECTION INTRAMUSCULAR; INTRAVENOUS PRN
Status: DISCONTINUED | OUTPATIENT
Start: 2019-10-21 | End: 2019-10-21 | Stop reason: SDUPTHER

## 2019-10-21 RX ORDER — DEXAMETHASONE SODIUM PHOSPHATE 4 MG/ML
INJECTION, SOLUTION INTRA-ARTICULAR; INTRALESIONAL; INTRAMUSCULAR; INTRAVENOUS; SOFT TISSUE PRN
Status: DISCONTINUED | OUTPATIENT
Start: 2019-10-21 | End: 2019-10-21 | Stop reason: SDUPTHER

## 2019-10-21 RX ORDER — SODIUM CHLORIDE, SODIUM LACTATE, POTASSIUM CHLORIDE, CALCIUM CHLORIDE 600; 310; 30; 20 MG/100ML; MG/100ML; MG/100ML; MG/100ML
INJECTION, SOLUTION INTRAVENOUS CONTINUOUS PRN
Status: DISCONTINUED | OUTPATIENT
Start: 2019-10-21 | End: 2019-10-21 | Stop reason: SDUPTHER

## 2019-10-21 RX ORDER — ROCURONIUM BROMIDE 10 MG/ML
INJECTION, SOLUTION INTRAVENOUS PRN
Status: DISCONTINUED | OUTPATIENT
Start: 2019-10-21 | End: 2019-10-21 | Stop reason: SDUPTHER

## 2019-10-21 RX ORDER — PHENYLEPHRINE HYDROCHLORIDE 10 MG/ML
INJECTION INTRAVENOUS PRN
Status: DISCONTINUED | OUTPATIENT
Start: 2019-10-21 | End: 2019-10-21 | Stop reason: SDUPTHER

## 2019-10-21 RX ORDER — NEOSTIGMINE METHYLSULFATE 5 MG/5 ML
SYRINGE (ML) INTRAVENOUS PRN
Status: DISCONTINUED | OUTPATIENT
Start: 2019-10-21 | End: 2019-10-21 | Stop reason: SDUPTHER

## 2019-10-21 RX ORDER — LABETALOL 20 MG/4 ML (5 MG/ML) INTRAVENOUS SYRINGE
10 EVERY 10 MIN PRN
Status: DISCONTINUED | OUTPATIENT
Start: 2019-10-21 | End: 2019-10-21 | Stop reason: HOSPADM

## 2019-10-21 RX ORDER — FENTANYL CITRATE 50 UG/ML
INJECTION, SOLUTION INTRAMUSCULAR; INTRAVENOUS PRN
Status: DISCONTINUED | OUTPATIENT
Start: 2019-10-21 | End: 2019-10-21 | Stop reason: SDUPTHER

## 2019-10-21 RX ORDER — SODIUM CHLORIDE 9 MG/ML
INJECTION, SOLUTION INTRAVENOUS CONTINUOUS
Status: DISCONTINUED | OUTPATIENT
Start: 2019-10-21 | End: 2019-10-23

## 2019-10-21 RX ORDER — SODIUM CHLORIDE 9 MG/ML
INJECTION, SOLUTION INTRAVENOUS CONTINUOUS PRN
Status: DISCONTINUED | OUTPATIENT
Start: 2019-10-21 | End: 2019-10-21 | Stop reason: SDUPTHER

## 2019-10-21 RX ORDER — PROPOFOL 10 MG/ML
INJECTION, EMULSION INTRAVENOUS PRN
Status: DISCONTINUED | OUTPATIENT
Start: 2019-10-21 | End: 2019-10-21 | Stop reason: SDUPTHER

## 2019-10-21 RX ORDER — FENTANYL CITRATE 50 UG/ML
50 INJECTION, SOLUTION INTRAMUSCULAR; INTRAVENOUS EVERY 5 MIN PRN
Status: DISCONTINUED | OUTPATIENT
Start: 2019-10-21 | End: 2019-10-21 | Stop reason: HOSPADM

## 2019-10-21 RX ORDER — SUCCINYLCHOLINE/SOD CL,ISO/PF 200MG/10ML
SYRINGE (ML) INTRAVENOUS PRN
Status: DISCONTINUED | OUTPATIENT
Start: 2019-10-21 | End: 2019-10-21 | Stop reason: SDUPTHER

## 2019-10-21 RX ORDER — SODIUM CHLORIDE 0.9 % (FLUSH) 0.9 %
10 SYRINGE (ML) INJECTION PRN
Status: DISCONTINUED | OUTPATIENT
Start: 2019-10-21 | End: 2019-10-28 | Stop reason: HOSPADM

## 2019-10-21 RX ORDER — ONDANSETRON 2 MG/ML
4 INJECTION INTRAMUSCULAR; INTRAVENOUS EVERY 6 HOURS PRN
Status: DISCONTINUED | OUTPATIENT
Start: 2019-10-21 | End: 2019-10-28 | Stop reason: HOSPADM

## 2019-10-21 RX ORDER — LIDOCAINE HCL/PF 100 MG/5ML
SYRINGE (ML) INJECTION PRN
Status: DISCONTINUED | OUTPATIENT
Start: 2019-10-21 | End: 2019-10-21 | Stop reason: SDUPTHER

## 2019-10-21 RX ADMIN — MEROPENEM 1 G: 1 INJECTION, POWDER, FOR SOLUTION INTRAVENOUS at 15:18

## 2019-10-21 RX ADMIN — HYDROMORPHONE HYDROCHLORIDE 0.5 MG: 1 INJECTION, SOLUTION INTRAMUSCULAR; INTRAVENOUS; SUBCUTANEOUS at 08:02

## 2019-10-21 RX ADMIN — SODIUM CHLORIDE, POTASSIUM CHLORIDE, SODIUM LACTATE AND CALCIUM CHLORIDE: 600; 310; 30; 20 INJECTION, SOLUTION INTRAVENOUS at 11:45

## 2019-10-21 RX ADMIN — MORPHINE SULFATE 2 MG: 2 INJECTION, SOLUTION INTRAMUSCULAR; INTRAVENOUS at 13:06

## 2019-10-21 RX ADMIN — PROPOFOL 170 MG: 10 INJECTION, EMULSION INTRAVENOUS at 10:37

## 2019-10-21 RX ADMIN — SODIUM CHLORIDE: 9 INJECTION, SOLUTION INTRAVENOUS at 11:25

## 2019-10-21 RX ADMIN — SODIUM CHLORIDE, POTASSIUM CHLORIDE, SODIUM LACTATE AND CALCIUM CHLORIDE: 600; 310; 30; 20 INJECTION, SOLUTION INTRAVENOUS at 11:51

## 2019-10-21 RX ADMIN — DEXAMETHASONE SODIUM PHOSPHATE 10 MG: 4 INJECTION, SOLUTION INTRAMUSCULAR; INTRAVENOUS at 10:43

## 2019-10-21 RX ADMIN — Medication 0.8 MG: at 12:05

## 2019-10-21 RX ADMIN — PHENYLEPHRINE HYDROCHLORIDE 100 MCG: 10 INJECTION INTRAVENOUS at 11:18

## 2019-10-21 RX ADMIN — Medication 140 MG: at 10:37

## 2019-10-21 RX ADMIN — PHENYLEPHRINE HYDROCHLORIDE 100 MCG: 10 INJECTION INTRAVENOUS at 11:34

## 2019-10-21 RX ADMIN — MORPHINE SULFATE 2 MG: 2 INJECTION, SOLUTION INTRAMUSCULAR; INTRAVENOUS at 12:45

## 2019-10-21 RX ADMIN — ROCURONIUM BROMIDE 30 MG: 10 INJECTION INTRAVENOUS at 10:50

## 2019-10-21 RX ADMIN — SODIUM CHLORIDE: 9 INJECTION, SOLUTION INTRAVENOUS at 15:18

## 2019-10-21 RX ADMIN — SODIUM CHLORIDE, PRESERVATIVE FREE 10 ML: 5 INJECTION INTRAVENOUS at 08:02

## 2019-10-21 RX ADMIN — HYDROMORPHONE HYDROCHLORIDE 1 MG: 1 INJECTION, SOLUTION INTRAMUSCULAR; INTRAVENOUS; SUBCUTANEOUS at 18:50

## 2019-10-21 RX ADMIN — FENTANYL CITRATE 50 MCG: 50 INJECTION, SOLUTION INTRAMUSCULAR; INTRAVENOUS at 12:41

## 2019-10-21 RX ADMIN — Medication 100 MG: at 10:35

## 2019-10-21 RX ADMIN — MORPHINE SULFATE 4 MG: 4 INJECTION, SOLUTION INTRAMUSCULAR; INTRAVENOUS at 04:00

## 2019-10-21 RX ADMIN — SODIUM CHLORIDE: 9 INJECTION, SOLUTION INTRAVENOUS at 10:28

## 2019-10-21 RX ADMIN — MORPHINE SULFATE 2 MG: 2 INJECTION, SOLUTION INTRAMUSCULAR; INTRAVENOUS at 12:53

## 2019-10-21 RX ADMIN — METOPROLOL SUCCINATE 50 MG: 50 TABLET, EXTENDED RELEASE ORAL at 08:01

## 2019-10-21 RX ADMIN — DOCUSATE SODIUM 100 MG: 100 CAPSULE, LIQUID FILLED ORAL at 21:23

## 2019-10-21 RX ADMIN — FENTANYL CITRATE 50 MCG: 50 INJECTION, SOLUTION INTRAMUSCULAR; INTRAVENOUS at 12:57

## 2019-10-21 RX ADMIN — LOSARTAN POTASSIUM 50 MG: 50 TABLET, FILM COATED ORAL at 08:01

## 2019-10-21 RX ADMIN — HYDROMORPHONE HYDROCHLORIDE 1 MG: 1 INJECTION, SOLUTION INTRAMUSCULAR; INTRAVENOUS; SUBCUTANEOUS at 15:13

## 2019-10-21 RX ADMIN — ONDANSETRON HYDROCHLORIDE 4 MG: 4 INJECTION, SOLUTION INTRAMUSCULAR; INTRAVENOUS at 10:43

## 2019-10-21 RX ADMIN — FENTANYL CITRATE 50 MCG: 50 INJECTION INTRAMUSCULAR; INTRAVENOUS at 11:11

## 2019-10-21 RX ADMIN — MEROPENEM 1 G: 1 INJECTION, POWDER, FOR SOLUTION INTRAVENOUS at 03:52

## 2019-10-21 RX ADMIN — Medication 10 ML: at 03:53

## 2019-10-21 RX ADMIN — ROCURONIUM BROMIDE 20 MG: 10 INJECTION INTRAVENOUS at 11:18

## 2019-10-21 RX ADMIN — Medication 5 MG: at 12:05

## 2019-10-21 ASSESSMENT — PULMONARY FUNCTION TESTS
PIF_VALUE: 23
PIF_VALUE: 33
PIF_VALUE: 33
PIF_VALUE: 36
PIF_VALUE: 24
PIF_VALUE: 2
PIF_VALUE: 1
PIF_VALUE: 4
PIF_VALUE: 25
PIF_VALUE: 33
PIF_VALUE: 26
PIF_VALUE: 24
PIF_VALUE: 22
PIF_VALUE: 23
PIF_VALUE: 24
PIF_VALUE: 2
PIF_VALUE: 4
PIF_VALUE: 23
PIF_VALUE: 26
PIF_VALUE: 22
PIF_VALUE: 2
PIF_VALUE: 39
PIF_VALUE: 33
PIF_VALUE: 26
PIF_VALUE: 23
PIF_VALUE: 33
PIF_VALUE: 27
PIF_VALUE: 0
PIF_VALUE: 2
PIF_VALUE: 34
PIF_VALUE: 23
PIF_VALUE: 24
PIF_VALUE: 3
PIF_VALUE: 35
PIF_VALUE: 16
PIF_VALUE: 24
PIF_VALUE: 24
PIF_VALUE: 26
PIF_VALUE: 26
PIF_VALUE: 27
PIF_VALUE: 24
PIF_VALUE: 34
PIF_VALUE: 24
PIF_VALUE: 24
PIF_VALUE: 23
PIF_VALUE: 34
PIF_VALUE: 23
PIF_VALUE: 32
PIF_VALUE: 35
PIF_VALUE: 23
PIF_VALUE: 35
PIF_VALUE: 33
PIF_VALUE: 24
PIF_VALUE: 25
PIF_VALUE: 24
PIF_VALUE: 25
PIF_VALUE: 22
PIF_VALUE: 23
PIF_VALUE: 24
PIF_VALUE: 23
PIF_VALUE: 25
PIF_VALUE: 26
PIF_VALUE: 24
PIF_VALUE: 3
PIF_VALUE: 1
PIF_VALUE: 22
PIF_VALUE: 33
PIF_VALUE: 0
PIF_VALUE: 24
PIF_VALUE: 7
PIF_VALUE: 22
PIF_VALUE: 28
PIF_VALUE: 24
PIF_VALUE: 28
PIF_VALUE: 22
PIF_VALUE: 25
PIF_VALUE: 32
PIF_VALUE: 24
PIF_VALUE: 23
PIF_VALUE: 2
PIF_VALUE: 22
PIF_VALUE: 24
PIF_VALUE: 0
PIF_VALUE: 19
PIF_VALUE: 25
PIF_VALUE: 0
PIF_VALUE: 19
PIF_VALUE: 24
PIF_VALUE: 32
PIF_VALUE: 25
PIF_VALUE: 0
PIF_VALUE: 24
PIF_VALUE: 24
PIF_VALUE: 23
PIF_VALUE: 25
PIF_VALUE: 23
PIF_VALUE: 24
PIF_VALUE: 0
PIF_VALUE: 0

## 2019-10-21 ASSESSMENT — PAIN DESCRIPTION - ONSET
ONSET: ON-GOING
ONSET: ON-GOING

## 2019-10-21 ASSESSMENT — PAIN DESCRIPTION - FREQUENCY
FREQUENCY: INTERMITTENT
FREQUENCY: INTERMITTENT

## 2019-10-21 ASSESSMENT — PAIN SCALES - GENERAL
PAINLEVEL_OUTOF10: 6
PAINLEVEL_OUTOF10: 0
PAINLEVEL_OUTOF10: 8
PAINLEVEL_OUTOF10: 4
PAINLEVEL_OUTOF10: 4
PAINLEVEL_OUTOF10: 6
PAINLEVEL_OUTOF10: 6
PAINLEVEL_OUTOF10: 7
PAINLEVEL_OUTOF10: 7
PAINLEVEL_OUTOF10: 3
PAINLEVEL_OUTOF10: 7
PAINLEVEL_OUTOF10: 0
PAINLEVEL_OUTOF10: 5
PAINLEVEL_OUTOF10: 5
PAINLEVEL_OUTOF10: 7

## 2019-10-21 ASSESSMENT — PAIN DESCRIPTION - LOCATION
LOCATION: ABDOMEN
LOCATION: ABDOMEN

## 2019-10-21 ASSESSMENT — PAIN DESCRIPTION - ORIENTATION: ORIENTATION: RIGHT

## 2019-10-21 ASSESSMENT — PAIN DESCRIPTION - PAIN TYPE
TYPE: ACUTE PAIN
TYPE: ACUTE PAIN
TYPE: ACUTE PAIN;SURGICAL PAIN

## 2019-10-21 ASSESSMENT — PAIN - FUNCTIONAL ASSESSMENT: PAIN_FUNCTIONAL_ASSESSMENT: PREVENTS OR INTERFERES SOME ACTIVE ACTIVITIES AND ADLS

## 2019-10-21 ASSESSMENT — PAIN DESCRIPTION - PROGRESSION: CLINICAL_PROGRESSION: NOT CHANGED

## 2019-10-21 ASSESSMENT — PAIN DESCRIPTION - DESCRIPTORS: DESCRIPTORS: SHARP

## 2019-10-22 ENCOUNTER — APPOINTMENT (OUTPATIENT)
Dept: GENERAL RADIOLOGY | Age: 69
DRG: 414 | End: 2019-10-22
Payer: MEDICARE

## 2019-10-22 LAB
ALBUMIN SERPL-MCNC: 1.7 G/DL (ref 3.5–5.1)
ALP BLD-CCNC: 89 U/L (ref 38–126)
ALT SERPL-CCNC: 40 U/L (ref 11–66)
ANION GAP SERPL CALCULATED.3IONS-SCNC: 9 MEQ/L (ref 8–16)
AST SERPL-CCNC: 32 U/L (ref 5–40)
BASOPHILS # BLD: 0.2 %
BASOPHILS ABSOLUTE: 0 THOU/MM3 (ref 0–0.1)
BILIRUB SERPL-MCNC: 1.5 MG/DL (ref 0.3–1.2)
BILIRUBIN DIRECT: 0.8 MG/DL (ref 0–0.3)
BUN BLDV-MCNC: 17 MG/DL (ref 7–22)
CALCIUM SERPL-MCNC: 8.2 MG/DL (ref 8.5–10.5)
CHLORIDE BLD-SCNC: 104 MEQ/L (ref 98–111)
CO2: 23 MEQ/L (ref 23–33)
CREAT SERPL-MCNC: 0.5 MG/DL (ref 0.4–1.2)
EOSINOPHIL # BLD: 0.1 %
EOSINOPHILS ABSOLUTE: 0 THOU/MM3 (ref 0–0.4)
ERYTHROCYTE [DISTWIDTH] IN BLOOD BY AUTOMATED COUNT: 15.4 % (ref 11.5–14.5)
ERYTHROCYTE [DISTWIDTH] IN BLOOD BY AUTOMATED COUNT: 53.9 FL (ref 35–45)
GFR SERPL CREATININE-BSD FRML MDRD: > 90 ML/MIN/1.73M2
GLUCOSE BLD-MCNC: 173 MG/DL (ref 70–108)
GLUCOSE BLD-MCNC: 183 MG/DL (ref 70–108)
GLUCOSE BLD-MCNC: 235 MG/DL (ref 70–108)
HCT VFR BLD CALC: 25.5 % (ref 42–52)
HCT VFR BLD CALC: 26.2 % (ref 42–52)
HCT VFR BLD CALC: 30.3 % (ref 42–52)
HEMOGLOBIN: 8.3 GM/DL (ref 14–18)
HEMOGLOBIN: 8.8 GM/DL (ref 14–18)
HEMOGLOBIN: 9.9 GM/DL (ref 14–18)
IMMATURE GRANS (ABS): 0.56 THOU/MM3 (ref 0–0.07)
IMMATURE GRANULOCYTES: 3.6 %
LIPASE: 14.3 U/L (ref 5.6–51.3)
LYMPHOCYTES # BLD: 4.4 %
LYMPHOCYTES ABSOLUTE: 0.7 THOU/MM3 (ref 1–4.8)
MCH RBC QN AUTO: 31 PG (ref 26–33)
MCHC RBC AUTO-ENTMCNC: 32.7 GM/DL (ref 32.2–35.5)
MCV RBC AUTO: 95 FL (ref 80–94)
MONOCYTES # BLD: 6.8 %
MONOCYTES ABSOLUTE: 1.1 THOU/MM3 (ref 0.4–1.3)
NUCLEATED RED BLOOD CELLS: 0 /100 WBC
PLATELET # BLD: 192 THOU/MM3 (ref 130–400)
PMV BLD AUTO: 10.6 FL (ref 9.4–12.4)
POTASSIUM REFLEX MAGNESIUM: 4 MEQ/L (ref 3.5–5.2)
RBC # BLD: 3.19 MILL/MM3 (ref 4.7–6.1)
SEG NEUTROPHILS: 84.9 %
SEGMENTED NEUTROPHILS ABSOLUTE COUNT: 13.2 THOU/MM3 (ref 1.8–7.7)
SODIUM BLD-SCNC: 136 MEQ/L (ref 135–145)
TOTAL PROTEIN: 4.8 G/DL (ref 6.1–8)
WBC # BLD: 15.5 THOU/MM3 (ref 4.8–10.8)

## 2019-10-22 PROCEDURE — 99024 POSTOP FOLLOW-UP VISIT: CPT | Performed by: SURGERY

## 2019-10-22 PROCEDURE — 83690 ASSAY OF LIPASE: CPT

## 2019-10-22 PROCEDURE — 2580000003 HC RX 258: Performed by: NURSE PRACTITIONER

## 2019-10-22 PROCEDURE — APPSS30 APP SPLIT SHARED TIME 16-30 MINUTES: Performed by: NURSE PRACTITIONER

## 2019-10-22 PROCEDURE — 6370000000 HC RX 637 (ALT 250 FOR IP): Performed by: NURSE PRACTITIONER

## 2019-10-22 PROCEDURE — 85025 COMPLETE CBC W/AUTO DIFF WBC: CPT

## 2019-10-22 PROCEDURE — 85014 HEMATOCRIT: CPT

## 2019-10-22 PROCEDURE — 6370000000 HC RX 637 (ALT 250 FOR IP): Performed by: SURGERY

## 2019-10-22 PROCEDURE — 2709999900 HC NON-CHARGEABLE SUPPLY

## 2019-10-22 PROCEDURE — 2060000000 HC ICU INTERMEDIATE R&B

## 2019-10-22 PROCEDURE — 36415 COLL VENOUS BLD VENIPUNCTURE: CPT

## 2019-10-22 PROCEDURE — 80048 BASIC METABOLIC PNL TOTAL CA: CPT

## 2019-10-22 PROCEDURE — 2580000003 HC RX 258: Performed by: SURGERY

## 2019-10-22 PROCEDURE — 74018 RADEX ABDOMEN 1 VIEW: CPT

## 2019-10-22 PROCEDURE — 99024 POSTOP FOLLOW-UP VISIT: CPT | Performed by: NURSE PRACTITIONER

## 2019-10-22 PROCEDURE — 51701 INSERT BLADDER CATHETER: CPT

## 2019-10-22 PROCEDURE — 6360000002 HC RX W HCPCS: Performed by: SURGERY

## 2019-10-22 PROCEDURE — 82948 REAGENT STRIP/BLOOD GLUCOSE: CPT

## 2019-10-22 PROCEDURE — 80076 HEPATIC FUNCTION PANEL: CPT

## 2019-10-22 PROCEDURE — 99233 SBSQ HOSP IP/OBS HIGH 50: CPT | Performed by: NURSE PRACTITIONER

## 2019-10-22 PROCEDURE — 85018 HEMOGLOBIN: CPT

## 2019-10-22 RX ORDER — DIAZEPAM 5 MG/1
5 TABLET ORAL EVERY 6 HOURS PRN
Status: DISCONTINUED | OUTPATIENT
Start: 2019-10-22 | End: 2019-10-28 | Stop reason: HOSPADM

## 2019-10-22 RX ORDER — ACETAMINOPHEN 325 MG/1
650 TABLET ORAL EVERY 6 HOURS PRN
COMMUNITY
End: 2020-11-17

## 2019-10-22 RX ORDER — OXYCODONE HYDROCHLORIDE AND ACETAMINOPHEN 5; 325 MG/1; MG/1
2 TABLET ORAL EVERY 4 HOURS PRN
Status: DISCONTINUED | OUTPATIENT
Start: 2019-10-22 | End: 2019-10-28 | Stop reason: HOSPADM

## 2019-10-22 RX ORDER — OXYCODONE HYDROCHLORIDE AND ACETAMINOPHEN 5; 325 MG/1; MG/1
1 TABLET ORAL EVERY 4 HOURS PRN
Status: DISCONTINUED | OUTPATIENT
Start: 2019-10-22 | End: 2019-10-28 | Stop reason: HOSPADM

## 2019-10-22 RX ADMIN — OXYCODONE HYDROCHLORIDE AND ACETAMINOPHEN 1 TABLET: 5; 325 TABLET ORAL at 22:13

## 2019-10-22 RX ADMIN — DOCUSATE SODIUM 100 MG: 100 CAPSULE, LIQUID FILLED ORAL at 21:06

## 2019-10-22 RX ADMIN — MEROPENEM 1 G: 1 INJECTION, POWDER, FOR SOLUTION INTRAVENOUS at 10:15

## 2019-10-22 RX ADMIN — DOCUSATE SODIUM 100 MG: 100 CAPSULE, LIQUID FILLED ORAL at 10:16

## 2019-10-22 RX ADMIN — OXYCODONE HYDROCHLORIDE AND ACETAMINOPHEN 2 TABLET: 5; 325 TABLET ORAL at 12:44

## 2019-10-22 RX ADMIN — INSULIN LISPRO 1 UNITS: 100 INJECTION, SOLUTION INTRAVENOUS; SUBCUTANEOUS at 21:06

## 2019-10-22 RX ADMIN — DIAZEPAM 5 MG: 5 TABLET ORAL at 18:02

## 2019-10-22 RX ADMIN — SODIUM CHLORIDE: 9 INJECTION, SOLUTION INTRAVENOUS at 09:22

## 2019-10-22 RX ADMIN — Medication 10 ML: at 09:22

## 2019-10-22 RX ADMIN — DIAZEPAM 5 MG: 5 TABLET ORAL at 10:15

## 2019-10-22 RX ADMIN — OXYCODONE HYDROCHLORIDE AND ACETAMINOPHEN 2 TABLET: 5; 325 TABLET ORAL at 08:38

## 2019-10-22 RX ADMIN — HYDROMORPHONE HYDROCHLORIDE 1 MG: 1 INJECTION, SOLUTION INTRAMUSCULAR; INTRAVENOUS; SUBCUTANEOUS at 02:20

## 2019-10-22 RX ADMIN — MEROPENEM 1 G: 1 INJECTION, POWDER, FOR SOLUTION INTRAVENOUS at 00:23

## 2019-10-22 RX ADMIN — OXYCODONE HYDROCHLORIDE AND ACETAMINOPHEN 2 TABLET: 5; 325 TABLET ORAL at 18:00

## 2019-10-22 RX ADMIN — HYDROMORPHONE HYDROCHLORIDE 1 MG: 1 INJECTION, SOLUTION INTRAMUSCULAR; INTRAVENOUS; SUBCUTANEOUS at 05:25

## 2019-10-22 RX ADMIN — HYDROMORPHONE HYDROCHLORIDE 1 MG: 1 INJECTION, SOLUTION INTRAMUSCULAR; INTRAVENOUS; SUBCUTANEOUS at 14:29

## 2019-10-22 RX ADMIN — SODIUM CHLORIDE: 9 INJECTION, SOLUTION INTRAVENOUS at 00:24

## 2019-10-22 RX ADMIN — POLYETHYLENE GLYCOL 3350 17 G: 17 POWDER, FOR SOLUTION ORAL at 10:15

## 2019-10-22 RX ADMIN — SODIUM CHLORIDE: 9 INJECTION, SOLUTION INTRAVENOUS at 22:13

## 2019-10-22 RX ADMIN — INSULIN LISPRO 1 UNITS: 100 INJECTION, SOLUTION INTRAVENOUS; SUBCUTANEOUS at 17:09

## 2019-10-22 RX ADMIN — OXYCODONE HYDROCHLORIDE AND ACETAMINOPHEN 2 TABLET: 5; 325 TABLET ORAL at 04:35

## 2019-10-22 RX ADMIN — MEROPENEM 1 G: 1 INJECTION, POWDER, FOR SOLUTION INTRAVENOUS at 17:10

## 2019-10-22 ASSESSMENT — PAIN DESCRIPTION - LOCATION
LOCATION: ABDOMEN
LOCATION: ABDOMEN

## 2019-10-22 ASSESSMENT — PAIN SCALES - GENERAL
PAINLEVEL_OUTOF10: 7
PAINLEVEL_OUTOF10: 10
PAINLEVEL_OUTOF10: 7
PAINLEVEL_OUTOF10: 8
PAINLEVEL_OUTOF10: 4
PAINLEVEL_OUTOF10: 6
PAINLEVEL_OUTOF10: 10
PAINLEVEL_OUTOF10: 7
PAINLEVEL_OUTOF10: 5
PAINLEVEL_OUTOF10: 8
PAINLEVEL_OUTOF10: 7
PAINLEVEL_OUTOF10: 4

## 2019-10-22 ASSESSMENT — PAIN DESCRIPTION - DESCRIPTORS
DESCRIPTORS: DISCOMFORT
DESCRIPTORS: DISCOMFORT

## 2019-10-22 ASSESSMENT — PAIN DESCRIPTION - PAIN TYPE
TYPE: SURGICAL PAIN
TYPE: SURGICAL PAIN

## 2019-10-22 ASSESSMENT — PAIN DESCRIPTION - FREQUENCY
FREQUENCY: INTERMITTENT
FREQUENCY: INTERMITTENT

## 2019-10-23 LAB
ALBUMIN SERPL-MCNC: 2.1 G/DL (ref 3.5–5.1)
ALP BLD-CCNC: 70 U/L (ref 38–126)
ALT SERPL-CCNC: 27 U/L (ref 11–66)
ANION GAP SERPL CALCULATED.3IONS-SCNC: 8 MEQ/L (ref 8–16)
AST SERPL-CCNC: 21 U/L (ref 5–40)
AVERAGE GLUCOSE: 117 MG/DL (ref 70–126)
BILIRUB SERPL-MCNC: 0.9 MG/DL (ref 0.3–1.2)
BILIRUBIN DIRECT: 0.5 MG/DL (ref 0–0.3)
BUN BLDV-MCNC: 17 MG/DL (ref 7–22)
CALCIUM SERPL-MCNC: 8 MG/DL (ref 8.5–10.5)
CHLORIDE BLD-SCNC: 101 MEQ/L (ref 98–111)
CO2: 26 MEQ/L (ref 23–33)
CREAT SERPL-MCNC: 0.6 MG/DL (ref 0.4–1.2)
ERYTHROCYTE [DISTWIDTH] IN BLOOD BY AUTOMATED COUNT: 15.5 % (ref 11.5–14.5)
ERYTHROCYTE [DISTWIDTH] IN BLOOD BY AUTOMATED COUNT: 54.2 FL (ref 35–45)
GFR SERPL CREATININE-BSD FRML MDRD: > 90 ML/MIN/1.73M2
GLUCOSE BLD-MCNC: 130 MG/DL (ref 70–108)
GLUCOSE BLD-MCNC: 137 MG/DL (ref 70–108)
GLUCOSE BLD-MCNC: 138 MG/DL (ref 70–108)
GLUCOSE BLD-MCNC: 139 MG/DL (ref 70–108)
GLUCOSE BLD-MCNC: 159 MG/DL (ref 70–108)
HBA1C MFR BLD: 5.9 % (ref 4.4–6.4)
HCT VFR BLD CALC: 22.1 % (ref 42–52)
HCT VFR BLD CALC: 22.8 % (ref 42–52)
HEMOGLOBIN: 7.3 GM/DL (ref 14–18)
HEMOGLOBIN: 7.3 GM/DL (ref 14–18)
MCH RBC QN AUTO: 31.6 PG (ref 26–33)
MCHC RBC AUTO-ENTMCNC: 33 GM/DL (ref 32.2–35.5)
MCV RBC AUTO: 95.7 FL (ref 80–94)
PLATELET # BLD: 180 THOU/MM3 (ref 130–400)
PMV BLD AUTO: 10.6 FL (ref 9.4–12.4)
POTASSIUM REFLEX MAGNESIUM: 4 MEQ/L (ref 3.5–5.2)
RBC # BLD: 2.31 MILL/MM3 (ref 4.7–6.1)
SODIUM BLD-SCNC: 135 MEQ/L (ref 135–145)
TOTAL PROTEIN: 4.7 G/DL (ref 6.1–8)
WBC # BLD: 16.6 THOU/MM3 (ref 4.8–10.8)

## 2019-10-23 PROCEDURE — 99024 POSTOP FOLLOW-UP VISIT: CPT | Performed by: SURGERY

## 2019-10-23 PROCEDURE — 80048 BASIC METABOLIC PNL TOTAL CA: CPT

## 2019-10-23 PROCEDURE — 80076 HEPATIC FUNCTION PANEL: CPT

## 2019-10-23 PROCEDURE — 6370000000 HC RX 637 (ALT 250 FOR IP): Performed by: SURGERY

## 2019-10-23 PROCEDURE — 2709999900 HC NON-CHARGEABLE SUPPLY

## 2019-10-23 PROCEDURE — 82948 REAGENT STRIP/BLOOD GLUCOSE: CPT

## 2019-10-23 PROCEDURE — 6370000000 HC RX 637 (ALT 250 FOR IP): Performed by: NURSE PRACTITIONER

## 2019-10-23 PROCEDURE — 85027 COMPLETE CBC AUTOMATED: CPT

## 2019-10-23 PROCEDURE — APPSS30 APP SPLIT SHARED TIME 16-30 MINUTES: Performed by: NURSE PRACTITIONER

## 2019-10-23 PROCEDURE — 99233 SBSQ HOSP IP/OBS HIGH 50: CPT | Performed by: INTERNAL MEDICINE

## 2019-10-23 PROCEDURE — 6360000002 HC RX W HCPCS: Performed by: SURGERY

## 2019-10-23 PROCEDURE — 83036 HEMOGLOBIN GLYCOSYLATED A1C: CPT

## 2019-10-23 PROCEDURE — 2580000003 HC RX 258: Performed by: SURGERY

## 2019-10-23 PROCEDURE — 2060000000 HC ICU INTERMEDIATE R&B

## 2019-10-23 PROCEDURE — 36415 COLL VENOUS BLD VENIPUNCTURE: CPT

## 2019-10-23 PROCEDURE — 99024 POSTOP FOLLOW-UP VISIT: CPT | Performed by: NURSE PRACTITIONER

## 2019-10-23 RX ADMIN — MEROPENEM 1 G: 1 INJECTION, POWDER, FOR SOLUTION INTRAVENOUS at 16:29

## 2019-10-23 RX ADMIN — Medication 10 ML: at 16:37

## 2019-10-23 RX ADMIN — DOCUSATE SODIUM 100 MG: 100 CAPSULE, LIQUID FILLED ORAL at 08:05

## 2019-10-23 RX ADMIN — POLYETHYLENE GLYCOL 3350 17 G: 17 POWDER, FOR SOLUTION ORAL at 08:05

## 2019-10-23 RX ADMIN — ACETAMINOPHEN 650 MG: 325 TABLET ORAL at 00:17

## 2019-10-23 RX ADMIN — OXYCODONE HYDROCHLORIDE AND ACETAMINOPHEN 1 TABLET: 5; 325 TABLET ORAL at 06:22

## 2019-10-23 RX ADMIN — OXYCODONE HYDROCHLORIDE AND ACETAMINOPHEN 1 TABLET: 5; 325 TABLET ORAL at 18:10

## 2019-10-23 RX ADMIN — DIAZEPAM 5 MG: 5 TABLET ORAL at 08:05

## 2019-10-23 RX ADMIN — ACETAMINOPHEN 650 MG: 325 TABLET ORAL at 08:05

## 2019-10-23 RX ADMIN — MEROPENEM 1 G: 1 INJECTION, POWDER, FOR SOLUTION INTRAVENOUS at 08:05

## 2019-10-23 RX ADMIN — MEROPENEM 1 G: 1 INJECTION, POWDER, FOR SOLUTION INTRAVENOUS at 00:02

## 2019-10-23 RX ADMIN — OXYCODONE HYDROCHLORIDE AND ACETAMINOPHEN 1 TABLET: 5; 325 TABLET ORAL at 13:36

## 2019-10-23 RX ADMIN — OXYCODONE HYDROCHLORIDE AND ACETAMINOPHEN 1 TABLET: 5; 325 TABLET ORAL at 23:04

## 2019-10-23 RX ADMIN — DOCUSATE SODIUM 100 MG: 100 CAPSULE, LIQUID FILLED ORAL at 21:32

## 2019-10-23 RX ADMIN — Medication 10 ML: at 23:04

## 2019-10-23 ASSESSMENT — PAIN SCALES - GENERAL
PAINLEVEL_OUTOF10: 5
PAINLEVEL_OUTOF10: 2
PAINLEVEL_OUTOF10: 3
PAINLEVEL_OUTOF10: 5
PAINLEVEL_OUTOF10: 1
PAINLEVEL_OUTOF10: 2
PAINLEVEL_OUTOF10: 2
PAINLEVEL_OUTOF10: 4
PAINLEVEL_OUTOF10: 4
PAINLEVEL_OUTOF10: 3
PAINLEVEL_OUTOF10: 5
PAINLEVEL_OUTOF10: 3

## 2019-10-23 ASSESSMENT — PAIN DESCRIPTION - LOCATION
LOCATION: ABDOMEN

## 2019-10-23 ASSESSMENT — PAIN DESCRIPTION - FREQUENCY
FREQUENCY: INTERMITTENT

## 2019-10-23 ASSESSMENT — PAIN DESCRIPTION - PAIN TYPE
TYPE: SURGICAL PAIN

## 2019-10-23 ASSESSMENT — PAIN DESCRIPTION - ORIENTATION
ORIENTATION: RIGHT

## 2019-10-23 ASSESSMENT — PAIN - FUNCTIONAL ASSESSMENT
PAIN_FUNCTIONAL_ASSESSMENT: ACTIVITIES ARE NOT PREVENTED
PAIN_FUNCTIONAL_ASSESSMENT: ACTIVITIES ARE NOT PREVENTED
PAIN_FUNCTIONAL_ASSESSMENT: PREVENTS OR INTERFERES SOME ACTIVE ACTIVITIES AND ADLS

## 2019-10-23 ASSESSMENT — PAIN DESCRIPTION - DESCRIPTORS
DESCRIPTORS: DISCOMFORT
DESCRIPTORS: DISCOMFORT
DESCRIPTORS: ACHING;DISCOMFORT

## 2019-10-23 ASSESSMENT — PAIN DESCRIPTION - ONSET
ONSET: ON-GOING

## 2019-10-23 ASSESSMENT — PAIN DESCRIPTION - PROGRESSION
CLINICAL_PROGRESSION: GRADUALLY WORSENING
CLINICAL_PROGRESSION: GRADUALLY IMPROVING
CLINICAL_PROGRESSION: GRADUALLY IMPROVING

## 2019-10-24 LAB
ANION GAP SERPL CALCULATED.3IONS-SCNC: 8 MEQ/L (ref 8–16)
BLOOD CULTURE, ROUTINE: NORMAL
BLOOD CULTURE, ROUTINE: NORMAL
BUN BLDV-MCNC: 14 MG/DL (ref 7–22)
CALCIUM SERPL-MCNC: 7.9 MG/DL (ref 8.5–10.5)
CHLORIDE BLD-SCNC: 102 MEQ/L (ref 98–111)
CO2: 27 MEQ/L (ref 23–33)
CREAT SERPL-MCNC: 0.6 MG/DL (ref 0.4–1.2)
ERYTHROCYTE [DISTWIDTH] IN BLOOD BY AUTOMATED COUNT: 15.3 % (ref 11.5–14.5)
ERYTHROCYTE [DISTWIDTH] IN BLOOD BY AUTOMATED COUNT: 52.3 FL (ref 35–45)
GFR SERPL CREATININE-BSD FRML MDRD: > 90 ML/MIN/1.73M2
GLUCOSE BLD-MCNC: 119 MG/DL (ref 70–108)
GLUCOSE BLD-MCNC: 131 MG/DL (ref 70–108)
GLUCOSE BLD-MCNC: 135 MG/DL (ref 70–108)
GLUCOSE BLD-MCNC: 141 MG/DL (ref 70–108)
GLUCOSE BLD-MCNC: 145 MG/DL (ref 70–108)
HCT VFR BLD CALC: 21.9 % (ref 42–52)
HEMOGLOBIN: 7.2 GM/DL (ref 14–18)
MCH RBC QN AUTO: 31 PG (ref 26–33)
MCHC RBC AUTO-ENTMCNC: 32.9 GM/DL (ref 32.2–35.5)
MCV RBC AUTO: 94.4 FL (ref 80–94)
PLATELET # BLD: 262 THOU/MM3 (ref 130–400)
PMV BLD AUTO: 10.5 FL (ref 9.4–12.4)
POTASSIUM SERPL-SCNC: 3.6 MEQ/L (ref 3.5–5.2)
RBC # BLD: 2.32 MILL/MM3 (ref 4.7–6.1)
SODIUM BLD-SCNC: 137 MEQ/L (ref 135–145)
WBC # BLD: 17.7 THOU/MM3 (ref 4.8–10.8)

## 2019-10-24 PROCEDURE — APPSS30 APP SPLIT SHARED TIME 16-30 MINUTES: Performed by: NURSE PRACTITIONER

## 2019-10-24 PROCEDURE — 6370000000 HC RX 637 (ALT 250 FOR IP): Performed by: NURSE PRACTITIONER

## 2019-10-24 PROCEDURE — 36415 COLL VENOUS BLD VENIPUNCTURE: CPT

## 2019-10-24 PROCEDURE — 2060000000 HC ICU INTERMEDIATE R&B

## 2019-10-24 PROCEDURE — 2580000003 HC RX 258: Performed by: NURSE PRACTITIONER

## 2019-10-24 PROCEDURE — 82948 REAGENT STRIP/BLOOD GLUCOSE: CPT

## 2019-10-24 PROCEDURE — 2580000003 HC RX 258: Performed by: SURGERY

## 2019-10-24 PROCEDURE — 6370000000 HC RX 637 (ALT 250 FOR IP): Performed by: SURGERY

## 2019-10-24 PROCEDURE — 99232 SBSQ HOSP IP/OBS MODERATE 35: CPT | Performed by: INTERNAL MEDICINE

## 2019-10-24 PROCEDURE — 99024 POSTOP FOLLOW-UP VISIT: CPT | Performed by: SURGERY

## 2019-10-24 PROCEDURE — 2709999900 HC NON-CHARGEABLE SUPPLY

## 2019-10-24 PROCEDURE — 80048 BASIC METABOLIC PNL TOTAL CA: CPT

## 2019-10-24 PROCEDURE — 6360000002 HC RX W HCPCS: Performed by: NURSE PRACTITIONER

## 2019-10-24 PROCEDURE — 85027 COMPLETE CBC AUTOMATED: CPT

## 2019-10-24 RX ADMIN — NALOXEGOL OXALATE 12.5 MG: 12.5 TABLET, FILM COATED ORAL at 08:12

## 2019-10-24 RX ADMIN — OXYCODONE HYDROCHLORIDE AND ACETAMINOPHEN 1 TABLET: 5; 325 TABLET ORAL at 06:49

## 2019-10-24 RX ADMIN — Medication 10 ML: at 08:14

## 2019-10-24 RX ADMIN — ACETAMINOPHEN 650 MG: 325 TABLET ORAL at 20:50

## 2019-10-24 RX ADMIN — OXYCODONE HYDROCHLORIDE AND ACETAMINOPHEN 2 TABLET: 5; 325 TABLET ORAL at 11:32

## 2019-10-24 RX ADMIN — INSULIN LISPRO 1 UNITS: 100 INJECTION, SOLUTION INTRAVENOUS; SUBCUTANEOUS at 17:39

## 2019-10-24 RX ADMIN — INSULIN LISPRO 1 UNITS: 100 INJECTION, SOLUTION INTRAVENOUS; SUBCUTANEOUS at 23:26

## 2019-10-24 RX ADMIN — ACETAMINOPHEN 650 MG: 325 TABLET ORAL at 15:57

## 2019-10-24 RX ADMIN — MEROPENEM 1 G: 1 INJECTION, POWDER, FOR SOLUTION INTRAVENOUS at 23:20

## 2019-10-24 RX ADMIN — POLYETHYLENE GLYCOL 3350 17 G: 17 POWDER, FOR SOLUTION ORAL at 08:12

## 2019-10-24 RX ADMIN — Medication 10 ML: at 20:51

## 2019-10-24 RX ADMIN — MEROPENEM 1 G: 1 INJECTION, POWDER, FOR SOLUTION INTRAVENOUS at 15:51

## 2019-10-24 RX ADMIN — DOCUSATE SODIUM 100 MG: 100 CAPSULE, LIQUID FILLED ORAL at 08:12

## 2019-10-24 ASSESSMENT — PAIN SCALES - GENERAL
PAINLEVEL_OUTOF10: 4
PAINLEVEL_OUTOF10: 0
PAINLEVEL_OUTOF10: 6
PAINLEVEL_OUTOF10: 6
PAINLEVEL_OUTOF10: 5
PAINLEVEL_OUTOF10: 6
PAINLEVEL_OUTOF10: 7

## 2019-10-25 LAB
ABO: NORMAL
ANTIBODY SCREEN: NORMAL
CALCIUM IONIZED: 1.1 MMOL/L (ref 1.12–1.32)
ERYTHROCYTE [DISTWIDTH] IN BLOOD BY AUTOMATED COUNT: 15 % (ref 11.5–14.5)
ERYTHROCYTE [DISTWIDTH] IN BLOOD BY AUTOMATED COUNT: 51.6 FL (ref 35–45)
GLUCOSE BLD-MCNC: 124 MG/DL (ref 70–108)
GLUCOSE BLD-MCNC: 140 MG/DL (ref 70–108)
GLUCOSE BLD-MCNC: 140 MG/DL (ref 70–108)
GLUCOSE BLD-MCNC: 158 MG/DL (ref 70–108)
HCT VFR BLD CALC: 20.3 % (ref 42–52)
HCT VFR BLD CALC: 28.1 % (ref 42–52)
HEMOGLOBIN: 6.6 GM/DL (ref 14–18)
HEMOGLOBIN: 9.2 GM/DL (ref 14–18)
MCH RBC QN AUTO: 30.8 PG (ref 26–33)
MCHC RBC AUTO-ENTMCNC: 32.5 GM/DL (ref 32.2–35.5)
MCV RBC AUTO: 94.9 FL (ref 80–94)
PATHOLOGIST REVIEW: ABNORMAL
PLATELET # BLD: 285 THOU/MM3 (ref 130–400)
PMV BLD AUTO: 9.6 FL (ref 9.4–12.4)
POTASSIUM SERPL-SCNC: 3.7 MEQ/L (ref 3.5–5.2)
RBC # BLD: 2.14 MILL/MM3 (ref 4.7–6.1)
RH FACTOR: NORMAL
SCAN OF BLOOD SMEAR: NORMAL
WBC # BLD: 18.3 THOU/MM3 (ref 4.8–10.8)

## 2019-10-25 PROCEDURE — 2580000003 HC RX 258: Performed by: NURSE PRACTITIONER

## 2019-10-25 PROCEDURE — 84132 ASSAY OF SERUM POTASSIUM: CPT

## 2019-10-25 PROCEDURE — P9016 RBC LEUKOCYTES REDUCED: HCPCS

## 2019-10-25 PROCEDURE — 6370000000 HC RX 637 (ALT 250 FOR IP): Performed by: NURSE PRACTITIONER

## 2019-10-25 PROCEDURE — 85018 HEMOGLOBIN: CPT

## 2019-10-25 PROCEDURE — 6370000000 HC RX 637 (ALT 250 FOR IP): Performed by: SURGERY

## 2019-10-25 PROCEDURE — 36430 TRANSFUSION BLD/BLD COMPNT: CPT

## 2019-10-25 PROCEDURE — 6360000002 HC RX W HCPCS: Performed by: NURSE PRACTITIONER

## 2019-10-25 PROCEDURE — 2060000000 HC ICU INTERMEDIATE R&B

## 2019-10-25 PROCEDURE — 99024 POSTOP FOLLOW-UP VISIT: CPT | Performed by: SURGERY

## 2019-10-25 PROCEDURE — 6370000000 HC RX 637 (ALT 250 FOR IP): Performed by: INTERNAL MEDICINE

## 2019-10-25 PROCEDURE — 82330 ASSAY OF CALCIUM: CPT

## 2019-10-25 PROCEDURE — 6360000002 HC RX W HCPCS: Performed by: INTERNAL MEDICINE

## 2019-10-25 PROCEDURE — 85014 HEMATOCRIT: CPT

## 2019-10-25 PROCEDURE — 86923 COMPATIBILITY TEST ELECTRIC: CPT

## 2019-10-25 PROCEDURE — 87070 CULTURE OTHR SPECIMN AEROBIC: CPT

## 2019-10-25 PROCEDURE — 36415 COLL VENOUS BLD VENIPUNCTURE: CPT

## 2019-10-25 PROCEDURE — 99233 SBSQ HOSP IP/OBS HIGH 50: CPT | Performed by: INTERNAL MEDICINE

## 2019-10-25 PROCEDURE — 85027 COMPLETE CBC AUTOMATED: CPT

## 2019-10-25 PROCEDURE — 86900 BLOOD TYPING SEROLOGIC ABO: CPT

## 2019-10-25 PROCEDURE — 2580000003 HC RX 258: Performed by: SURGERY

## 2019-10-25 PROCEDURE — 2580000003 HC RX 258: Performed by: PHYSICIAN ASSISTANT

## 2019-10-25 PROCEDURE — 2709999900 HC NON-CHARGEABLE SUPPLY

## 2019-10-25 PROCEDURE — 86850 RBC ANTIBODY SCREEN: CPT

## 2019-10-25 PROCEDURE — 86901 BLOOD TYPING SEROLOGIC RH(D): CPT

## 2019-10-25 PROCEDURE — APPSS30 APP SPLIT SHARED TIME 16-30 MINUTES: Performed by: NURSE PRACTITIONER

## 2019-10-25 PROCEDURE — 94760 N-INVAS EAR/PLS OXIMETRY 1: CPT

## 2019-10-25 PROCEDURE — 87205 SMEAR GRAM STAIN: CPT

## 2019-10-25 PROCEDURE — 82948 REAGENT STRIP/BLOOD GLUCOSE: CPT

## 2019-10-25 PROCEDURE — 87075 CULTR BACTERIA EXCEPT BLOOD: CPT

## 2019-10-25 RX ORDER — FUROSEMIDE 10 MG/ML
20 INJECTION INTRAMUSCULAR; INTRAVENOUS ONCE
Status: COMPLETED | OUTPATIENT
Start: 2019-10-25 | End: 2019-10-25

## 2019-10-25 RX ORDER — 0.9 % SODIUM CHLORIDE 0.9 %
250 INTRAVENOUS SOLUTION INTRAVENOUS ONCE
Status: COMPLETED | OUTPATIENT
Start: 2019-10-25 | End: 2019-10-26

## 2019-10-25 RX ORDER — 0.9 % SODIUM CHLORIDE 0.9 %
250 INTRAVENOUS SOLUTION INTRAVENOUS ONCE
Status: COMPLETED | OUTPATIENT
Start: 2019-10-25 | End: 2019-10-25

## 2019-10-25 RX ORDER — LANOLIN ALCOHOL/MO/W.PET/CERES
3 CREAM (GRAM) TOPICAL NIGHTLY
Status: DISCONTINUED | OUTPATIENT
Start: 2019-10-25 | End: 2019-10-28 | Stop reason: HOSPADM

## 2019-10-25 RX ADMIN — MEROPENEM 1 G: 1 INJECTION, POWDER, FOR SOLUTION INTRAVENOUS at 23:20

## 2019-10-25 RX ADMIN — INSULIN LISPRO 1 UNITS: 100 INJECTION, SOLUTION INTRAVENOUS; SUBCUTANEOUS at 12:30

## 2019-10-25 RX ADMIN — Medication 10 ML: at 07:36

## 2019-10-25 RX ADMIN — OXYCODONE HYDROCHLORIDE AND ACETAMINOPHEN 2 TABLET: 5; 325 TABLET ORAL at 10:31

## 2019-10-25 RX ADMIN — FUROSEMIDE 20 MG: 10 INJECTION, SOLUTION INTRAMUSCULAR; INTRAVENOUS at 14:20

## 2019-10-25 RX ADMIN — Medication 10 ML: at 21:31

## 2019-10-25 RX ADMIN — POLYETHYLENE GLYCOL 3350 17 G: 17 POWDER, FOR SOLUTION ORAL at 07:36

## 2019-10-25 RX ADMIN — NALOXEGOL OXALATE 12.5 MG: 12.5 TABLET, FILM COATED ORAL at 07:36

## 2019-10-25 RX ADMIN — OXYCODONE HYDROCHLORIDE AND ACETAMINOPHEN 2 TABLET: 5; 325 TABLET ORAL at 18:01

## 2019-10-25 RX ADMIN — ACETAMINOPHEN 650 MG: 325 TABLET ORAL at 03:12

## 2019-10-25 RX ADMIN — Medication 3 MG: at 21:38

## 2019-10-25 RX ADMIN — SODIUM CHLORIDE 250 ML: 9 INJECTION, SOLUTION INTRAVENOUS at 11:40

## 2019-10-25 RX ADMIN — SODIUM CHLORIDE 250 ML: 9 INJECTION, SOLUTION INTRAVENOUS at 14:21

## 2019-10-25 RX ADMIN — MEROPENEM 1 G: 1 INJECTION, POWDER, FOR SOLUTION INTRAVENOUS at 14:57

## 2019-10-25 RX ADMIN — OXYCODONE HYDROCHLORIDE AND ACETAMINOPHEN 2 TABLET: 5; 325 TABLET ORAL at 21:59

## 2019-10-25 RX ADMIN — MEROPENEM 1 G: 1 INJECTION, POWDER, FOR SOLUTION INTRAVENOUS at 06:40

## 2019-10-25 RX ADMIN — ACETAMINOPHEN 650 MG: 325 TABLET ORAL at 07:36

## 2019-10-25 ASSESSMENT — PAIN SCALES - GENERAL
PAINLEVEL_OUTOF10: 6
PAINLEVEL_OUTOF10: 7
PAINLEVEL_OUTOF10: 5
PAINLEVEL_OUTOF10: 2

## 2019-10-25 ASSESSMENT — PAIN DESCRIPTION - PROGRESSION
CLINICAL_PROGRESSION: GRADUALLY IMPROVING

## 2019-10-25 ASSESSMENT — PAIN DESCRIPTION - FREQUENCY
FREQUENCY: INTERMITTENT

## 2019-10-25 ASSESSMENT — PAIN DESCRIPTION - ONSET
ONSET: ON-GOING

## 2019-10-25 ASSESSMENT — PAIN DESCRIPTION - PAIN TYPE
TYPE: SURGICAL PAIN

## 2019-10-25 ASSESSMENT — PAIN - FUNCTIONAL ASSESSMENT
PAIN_FUNCTIONAL_ASSESSMENT: ACTIVITIES ARE NOT PREVENTED

## 2019-10-25 ASSESSMENT — PAIN DESCRIPTION - DESCRIPTORS
DESCRIPTORS: ACHING

## 2019-10-25 ASSESSMENT — PAIN DESCRIPTION - ORIENTATION
ORIENTATION: RIGHT

## 2019-10-25 ASSESSMENT — PAIN DESCRIPTION - LOCATION
LOCATION: ABDOMEN

## 2019-10-26 LAB
ANION GAP SERPL CALCULATED.3IONS-SCNC: 9 MEQ/L (ref 8–16)
BUN BLDV-MCNC: 10 MG/DL (ref 7–22)
CALCIUM SERPL-MCNC: 8.3 MG/DL (ref 8.5–10.5)
CHLORIDE BLD-SCNC: 100 MEQ/L (ref 98–111)
CO2: 28 MEQ/L (ref 23–33)
CREAT SERPL-MCNC: 0.5 MG/DL (ref 0.4–1.2)
ERYTHROCYTE [DISTWIDTH] IN BLOOD BY AUTOMATED COUNT: 15.5 % (ref 11.5–14.5)
ERYTHROCYTE [DISTWIDTH] IN BLOOD BY AUTOMATED COUNT: 51.1 FL (ref 35–45)
GFR SERPL CREATININE-BSD FRML MDRD: > 90 ML/MIN/1.73M2
GLUCOSE BLD-MCNC: 124 MG/DL (ref 70–108)
GLUCOSE BLD-MCNC: 125 MG/DL (ref 70–108)
GLUCOSE BLD-MCNC: 126 MG/DL (ref 70–108)
GLUCOSE BLD-MCNC: 139 MG/DL (ref 70–108)
GLUCOSE BLD-MCNC: 148 MG/DL (ref 70–108)
HCT VFR BLD CALC: 25 % (ref 42–52)
HEMOGLOBIN: 8.2 GM/DL (ref 14–18)
MCH RBC QN AUTO: 30.6 PG (ref 26–33)
MCHC RBC AUTO-ENTMCNC: 32.8 GM/DL (ref 32.2–35.5)
MCV RBC AUTO: 93.3 FL (ref 80–94)
PLATELET # BLD: 346 THOU/MM3 (ref 130–400)
PMV BLD AUTO: 9.3 FL (ref 9.4–12.4)
POTASSIUM SERPL-SCNC: 3.8 MEQ/L (ref 3.5–5.2)
RBC # BLD: 2.68 MILL/MM3 (ref 4.7–6.1)
SODIUM BLD-SCNC: 137 MEQ/L (ref 135–145)
WBC # BLD: 18.6 THOU/MM3 (ref 4.8–10.8)

## 2019-10-26 PROCEDURE — 36415 COLL VENOUS BLD VENIPUNCTURE: CPT

## 2019-10-26 PROCEDURE — 6370000000 HC RX 637 (ALT 250 FOR IP): Performed by: SURGERY

## 2019-10-26 PROCEDURE — APPSS45 APP SPLIT SHARED TIME 31-45 MINUTES: Performed by: PHYSICIAN ASSISTANT

## 2019-10-26 PROCEDURE — 6360000002 HC RX W HCPCS: Performed by: NURSE PRACTITIONER

## 2019-10-26 PROCEDURE — 6370000000 HC RX 637 (ALT 250 FOR IP): Performed by: INTERNAL MEDICINE

## 2019-10-26 PROCEDURE — 2060000000 HC ICU INTERMEDIATE R&B

## 2019-10-26 PROCEDURE — 82948 REAGENT STRIP/BLOOD GLUCOSE: CPT

## 2019-10-26 PROCEDURE — 2580000003 HC RX 258: Performed by: SURGERY

## 2019-10-26 PROCEDURE — 99232 SBSQ HOSP IP/OBS MODERATE 35: CPT | Performed by: INTERNAL MEDICINE

## 2019-10-26 PROCEDURE — 85027 COMPLETE CBC AUTOMATED: CPT

## 2019-10-26 PROCEDURE — 2580000003 HC RX 258: Performed by: NURSE PRACTITIONER

## 2019-10-26 PROCEDURE — 80048 BASIC METABOLIC PNL TOTAL CA: CPT

## 2019-10-26 PROCEDURE — 99024 POSTOP FOLLOW-UP VISIT: CPT | Performed by: SURGERY

## 2019-10-26 PROCEDURE — 2709999900 HC NON-CHARGEABLE SUPPLY

## 2019-10-26 RX ORDER — CHLORPROMAZINE HYDROCHLORIDE 25 MG/1
25 TABLET, FILM COATED ORAL 3 TIMES DAILY PRN
Status: DISCONTINUED | OUTPATIENT
Start: 2019-10-26 | End: 2019-10-28 | Stop reason: HOSPADM

## 2019-10-26 RX ADMIN — MEROPENEM 1 G: 1 INJECTION, POWDER, FOR SOLUTION INTRAVENOUS at 06:46

## 2019-10-26 RX ADMIN — Medication 3 MG: at 20:39

## 2019-10-26 RX ADMIN — MEROPENEM 1 G: 1 INJECTION, POWDER, FOR SOLUTION INTRAVENOUS at 15:35

## 2019-10-26 RX ADMIN — OXYCODONE HYDROCHLORIDE AND ACETAMINOPHEN 2 TABLET: 5; 325 TABLET ORAL at 15:37

## 2019-10-26 RX ADMIN — POLYETHYLENE GLYCOL 3350 17 G: 17 POWDER, FOR SOLUTION ORAL at 08:30

## 2019-10-26 RX ADMIN — Medication 10 ML: at 08:29

## 2019-10-26 RX ADMIN — Medication 10 ML: at 20:39

## 2019-10-26 RX ADMIN — DOCUSATE SODIUM 100 MG: 100 CAPSULE, LIQUID FILLED ORAL at 20:38

## 2019-10-26 RX ADMIN — OXYCODONE HYDROCHLORIDE AND ACETAMINOPHEN 2 TABLET: 5; 325 TABLET ORAL at 04:22

## 2019-10-26 ASSESSMENT — PAIN DESCRIPTION - PROGRESSION: CLINICAL_PROGRESSION: NOT CHANGED

## 2019-10-26 ASSESSMENT — PAIN DESCRIPTION - DESCRIPTORS
DESCRIPTORS: ACHING;DISCOMFORT
DESCRIPTORS: DISCOMFORT

## 2019-10-26 ASSESSMENT — PAIN - FUNCTIONAL ASSESSMENT: PAIN_FUNCTIONAL_ASSESSMENT: ACTIVITIES ARE NOT PREVENTED

## 2019-10-26 ASSESSMENT — PAIN DESCRIPTION - LOCATION
LOCATION: ABDOMEN
LOCATION: ABDOMEN;INCISION

## 2019-10-26 ASSESSMENT — PAIN SCALES - GENERAL
PAINLEVEL_OUTOF10: 7

## 2019-10-26 ASSESSMENT — PAIN DESCRIPTION - PAIN TYPE
TYPE: SURGICAL PAIN
TYPE: SURGICAL PAIN

## 2019-10-26 ASSESSMENT — PAIN DESCRIPTION - ORIENTATION
ORIENTATION: RIGHT
ORIENTATION: RIGHT

## 2019-10-26 ASSESSMENT — PAIN DESCRIPTION - ONSET: ONSET: ON-GOING

## 2019-10-26 ASSESSMENT — PAIN DESCRIPTION - FREQUENCY
FREQUENCY: CONTINUOUS
FREQUENCY: CONTINUOUS

## 2019-10-27 LAB
ANION GAP SERPL CALCULATED.3IONS-SCNC: 9 MEQ/L (ref 8–16)
BUN BLDV-MCNC: 11 MG/DL (ref 7–22)
CALCIUM SERPL-MCNC: 8.2 MG/DL (ref 8.5–10.5)
CHLORIDE BLD-SCNC: 102 MEQ/L (ref 98–111)
CO2: 28 MEQ/L (ref 23–33)
CREAT SERPL-MCNC: 0.6 MG/DL (ref 0.4–1.2)
ERYTHROCYTE [DISTWIDTH] IN BLOOD BY AUTOMATED COUNT: 15.5 % (ref 11.5–14.5)
ERYTHROCYTE [DISTWIDTH] IN BLOOD BY AUTOMATED COUNT: 52.4 FL (ref 35–45)
GFR SERPL CREATININE-BSD FRML MDRD: > 90 ML/MIN/1.73M2
GLUCOSE BLD-MCNC: 101 MG/DL (ref 70–108)
GLUCOSE BLD-MCNC: 104 MG/DL (ref 70–108)
GLUCOSE BLD-MCNC: 128 MG/DL (ref 70–108)
GLUCOSE BLD-MCNC: 138 MG/DL (ref 70–108)
GLUCOSE BLD-MCNC: 138 MG/DL (ref 70–108)
HCT VFR BLD CALC: 25.1 % (ref 42–52)
HEMOGLOBIN: 8 GM/DL (ref 14–18)
MCH RBC QN AUTO: 30.2 PG (ref 26–33)
MCHC RBC AUTO-ENTMCNC: 31.9 GM/DL (ref 32.2–35.5)
MCV RBC AUTO: 94.7 FL (ref 80–94)
PLATELET # BLD: 422 THOU/MM3 (ref 130–400)
PMV BLD AUTO: 9.3 FL (ref 9.4–12.4)
POTASSIUM SERPL-SCNC: 4.3 MEQ/L (ref 3.5–5.2)
RBC # BLD: 2.65 MILL/MM3 (ref 4.7–6.1)
SODIUM BLD-SCNC: 139 MEQ/L (ref 135–145)
WBC # BLD: 17.7 THOU/MM3 (ref 4.8–10.8)

## 2019-10-27 PROCEDURE — 6360000002 HC RX W HCPCS: Performed by: NURSE PRACTITIONER

## 2019-10-27 PROCEDURE — 6370000000 HC RX 637 (ALT 250 FOR IP): Performed by: SURGERY

## 2019-10-27 PROCEDURE — 2580000003 HC RX 258: Performed by: NURSE PRACTITIONER

## 2019-10-27 PROCEDURE — 85027 COMPLETE CBC AUTOMATED: CPT

## 2019-10-27 PROCEDURE — 2580000003 HC RX 258: Performed by: SURGERY

## 2019-10-27 PROCEDURE — 6370000000 HC RX 637 (ALT 250 FOR IP): Performed by: INTERNAL MEDICINE

## 2019-10-27 PROCEDURE — 80048 BASIC METABOLIC PNL TOTAL CA: CPT

## 2019-10-27 PROCEDURE — 82948 REAGENT STRIP/BLOOD GLUCOSE: CPT

## 2019-10-27 PROCEDURE — 99024 POSTOP FOLLOW-UP VISIT: CPT | Performed by: SURGERY

## 2019-10-27 PROCEDURE — 2060000000 HC ICU INTERMEDIATE R&B

## 2019-10-27 PROCEDURE — 99232 SBSQ HOSP IP/OBS MODERATE 35: CPT | Performed by: INTERNAL MEDICINE

## 2019-10-27 PROCEDURE — 36415 COLL VENOUS BLD VENIPUNCTURE: CPT

## 2019-10-27 RX ADMIN — ACETAMINOPHEN 650 MG: 325 TABLET ORAL at 16:19

## 2019-10-27 RX ADMIN — MEROPENEM 1 G: 1 INJECTION, POWDER, FOR SOLUTION INTRAVENOUS at 06:30

## 2019-10-27 RX ADMIN — OXYCODONE HYDROCHLORIDE AND ACETAMINOPHEN 2 TABLET: 5; 325 TABLET ORAL at 07:06

## 2019-10-27 RX ADMIN — MEROPENEM 1 G: 1 INJECTION, POWDER, FOR SOLUTION INTRAVENOUS at 00:08

## 2019-10-27 RX ADMIN — MEROPENEM 1 G: 1 INJECTION, POWDER, FOR SOLUTION INTRAVENOUS at 23:18

## 2019-10-27 RX ADMIN — POLYETHYLENE GLYCOL 3350 17 G: 17 POWDER, FOR SOLUTION ORAL at 07:38

## 2019-10-27 RX ADMIN — Medication 10 ML: at 20:27

## 2019-10-27 RX ADMIN — Medication 10 ML: at 07:38

## 2019-10-27 RX ADMIN — DOCUSATE SODIUM 100 MG: 100 CAPSULE, LIQUID FILLED ORAL at 20:26

## 2019-10-27 RX ADMIN — Medication 3 MG: at 20:26

## 2019-10-27 RX ADMIN — OXYCODONE HYDROCHLORIDE AND ACETAMINOPHEN 2 TABLET: 5; 325 TABLET ORAL at 20:27

## 2019-10-27 RX ADMIN — MEROPENEM 1 G: 1 INJECTION, POWDER, FOR SOLUTION INTRAVENOUS at 14:46

## 2019-10-27 ASSESSMENT — PAIN DESCRIPTION - FREQUENCY
FREQUENCY: CONTINUOUS

## 2019-10-27 ASSESSMENT — PAIN DESCRIPTION - ONSET
ONSET: ON-GOING

## 2019-10-27 ASSESSMENT — PAIN DESCRIPTION - LOCATION
LOCATION: ABDOMEN

## 2019-10-27 ASSESSMENT — PAIN DESCRIPTION - ORIENTATION
ORIENTATION: RIGHT

## 2019-10-27 ASSESSMENT — PAIN SCALES - GENERAL
PAINLEVEL_OUTOF10: 3
PAINLEVEL_OUTOF10: 7
PAINLEVEL_OUTOF10: 3
PAINLEVEL_OUTOF10: 7
PAINLEVEL_OUTOF10: 0

## 2019-10-27 ASSESSMENT — PAIN DESCRIPTION - DESCRIPTORS
DESCRIPTORS: ACHING

## 2019-10-27 ASSESSMENT — PAIN DESCRIPTION - PAIN TYPE
TYPE: SURGICAL PAIN

## 2019-10-27 ASSESSMENT — PAIN - FUNCTIONAL ASSESSMENT
PAIN_FUNCTIONAL_ASSESSMENT: ACTIVITIES ARE NOT PREVENTED

## 2019-10-27 ASSESSMENT — PAIN DESCRIPTION - PROGRESSION
CLINICAL_PROGRESSION: GRADUALLY IMPROVING
CLINICAL_PROGRESSION: NOT CHANGED
CLINICAL_PROGRESSION: NOT CHANGED

## 2019-10-28 ENCOUNTER — TELEPHONE (OUTPATIENT)
Dept: FAMILY MEDICINE CLINIC | Age: 69
End: 2019-10-28

## 2019-10-28 ENCOUNTER — APPOINTMENT (OUTPATIENT)
Dept: NUCLEAR MEDICINE | Age: 69
DRG: 414 | End: 2019-10-28
Payer: MEDICARE

## 2019-10-28 VITALS
HEIGHT: 72 IN | RESPIRATION RATE: 24 BRPM | SYSTOLIC BLOOD PRESSURE: 139 MMHG | BODY MASS INDEX: 34.32 KG/M2 | DIASTOLIC BLOOD PRESSURE: 86 MMHG | OXYGEN SATURATION: 96 % | WEIGHT: 253.4 LBS | HEART RATE: 106 BPM | TEMPERATURE: 98.6 F

## 2019-10-28 PROBLEM — K80.50 PANCREATITIS DUE TO COMMON BILE DUCT STONE: Status: RESOLVED | Noted: 2019-10-17 | Resolved: 2019-10-28

## 2019-10-28 PROBLEM — Z90.49 S/P CHOLECYSTECTOMY: Status: ACTIVE | Noted: 2019-10-28

## 2019-10-28 PROBLEM — K85.90 PANCREATITIS DUE TO COMMON BILE DUCT STONE: Status: RESOLVED | Noted: 2019-10-17 | Resolved: 2019-10-28

## 2019-10-28 LAB
ANION GAP SERPL CALCULATED.3IONS-SCNC: 12 MEQ/L (ref 8–16)
BUN BLDV-MCNC: 10 MG/DL (ref 7–22)
CALCIUM SERPL-MCNC: 8.9 MG/DL (ref 8.5–10.5)
CHLORIDE BLD-SCNC: 99 MEQ/L (ref 98–111)
CO2: 27 MEQ/L (ref 23–33)
CREAT SERPL-MCNC: 0.7 MG/DL (ref 0.4–1.2)
ERYTHROCYTE [DISTWIDTH] IN BLOOD BY AUTOMATED COUNT: 15.6 % (ref 11.5–14.5)
ERYTHROCYTE [DISTWIDTH] IN BLOOD BY AUTOMATED COUNT: 54.1 FL (ref 35–45)
GFR SERPL CREATININE-BSD FRML MDRD: > 90 ML/MIN/1.73M2
GLUCOSE BLD-MCNC: 116 MG/DL (ref 70–108)
GLUCOSE BLD-MCNC: 90 MG/DL (ref 70–108)
HCT VFR BLD CALC: 29.7 % (ref 42–52)
HEMOGLOBIN: 9.3 GM/DL (ref 14–18)
MCH RBC QN AUTO: 30.5 PG (ref 26–33)
MCHC RBC AUTO-ENTMCNC: 31.3 GM/DL (ref 32.2–35.5)
MCV RBC AUTO: 97.4 FL (ref 80–94)
PLATELET # BLD: 547 THOU/MM3 (ref 130–400)
PMV BLD AUTO: 9.2 FL (ref 9.4–12.4)
POTASSIUM SERPL-SCNC: 4.7 MEQ/L (ref 3.5–5.2)
RBC # BLD: 3.05 MILL/MM3 (ref 4.7–6.1)
SODIUM BLD-SCNC: 138 MEQ/L (ref 135–145)
WBC # BLD: 15.9 THOU/MM3 (ref 4.8–10.8)

## 2019-10-28 PROCEDURE — 6370000000 HC RX 637 (ALT 250 FOR IP): Performed by: SURGERY

## 2019-10-28 PROCEDURE — APPSS45 APP SPLIT SHARED TIME 31-45 MINUTES: Performed by: NURSE PRACTITIONER

## 2019-10-28 PROCEDURE — 3430000000 HC RX DIAGNOSTIC RADIOPHARMACEUTICAL: Performed by: NURSE PRACTITIONER

## 2019-10-28 PROCEDURE — 85027 COMPLETE CBC AUTOMATED: CPT

## 2019-10-28 PROCEDURE — 99024 POSTOP FOLLOW-UP VISIT: CPT | Performed by: SURGERY

## 2019-10-28 PROCEDURE — 36415 COLL VENOUS BLD VENIPUNCTURE: CPT

## 2019-10-28 PROCEDURE — 2709999900 HC NON-CHARGEABLE SUPPLY

## 2019-10-28 PROCEDURE — 6360000002 HC RX W HCPCS: Performed by: NURSE PRACTITIONER

## 2019-10-28 PROCEDURE — 97116 GAIT TRAINING THERAPY: CPT

## 2019-10-28 PROCEDURE — 99238 HOSP IP/OBS DSCHRG MGMT 30/<: CPT | Performed by: INTERNAL MEDICINE

## 2019-10-28 PROCEDURE — 2580000003 HC RX 258: Performed by: NURSE PRACTITIONER

## 2019-10-28 PROCEDURE — 82948 REAGENT STRIP/BLOOD GLUCOSE: CPT

## 2019-10-28 PROCEDURE — A9537 TC99M MEBROFENIN: HCPCS | Performed by: NURSE PRACTITIONER

## 2019-10-28 PROCEDURE — 2580000003 HC RX 258: Performed by: SURGERY

## 2019-10-28 PROCEDURE — 78226 HEPATOBILIARY SYSTEM IMAGING: CPT

## 2019-10-28 PROCEDURE — 99024 POSTOP FOLLOW-UP VISIT: CPT | Performed by: NURSE PRACTITIONER

## 2019-10-28 PROCEDURE — 80048 BASIC METABOLIC PNL TOTAL CA: CPT

## 2019-10-28 PROCEDURE — 97162 PT EVAL MOD COMPLEX 30 MIN: CPT

## 2019-10-28 RX ORDER — IBUPROFEN 800 MG/1
800 TABLET ORAL EVERY 8 HOURS PRN
Qty: 20 TABLET | Refills: 0 | Status: SHIPPED | OUTPATIENT
Start: 2019-10-28 | End: 2020-11-17

## 2019-10-28 RX ORDER — CHLORPROMAZINE HYDROCHLORIDE 25 MG/1
25 TABLET, FILM COATED ORAL 3 TIMES DAILY PRN
Qty: 10 TABLET | Refills: 0 | Status: SHIPPED | OUTPATIENT
Start: 2019-10-28 | End: 2020-11-17

## 2019-10-28 RX ORDER — OXYCODONE HYDROCHLORIDE AND ACETAMINOPHEN 5; 325 MG/1; MG/1
1 TABLET ORAL EVERY 6 HOURS PRN
Qty: 20 TABLET | Refills: 0 | Status: SHIPPED | OUTPATIENT
Start: 2019-10-28 | End: 2019-11-04

## 2019-10-28 RX ORDER — LANOLIN ALCOHOL/MO/W.PET/CERES
3 CREAM (GRAM) TOPICAL NIGHTLY
Qty: 30 TABLET | Refills: 0 | Status: SHIPPED | OUTPATIENT
Start: 2019-10-28 | End: 2020-11-17

## 2019-10-28 RX ORDER — AMOXICILLIN AND CLAVULANATE POTASSIUM 500; 125 MG/1; MG/1
1 TABLET, FILM COATED ORAL EVERY 12 HOURS
Qty: 14 TABLET | Refills: 0 | Status: SHIPPED | OUTPATIENT
Start: 2019-10-28 | End: 2019-11-04

## 2019-10-28 RX ORDER — PSEUDOEPHEDRINE HCL 30 MG
100 TABLET ORAL 2 TIMES DAILY
Qty: 60 CAPSULE | Refills: 0 | Status: SHIPPED | OUTPATIENT
Start: 2019-10-28 | End: 2020-11-17

## 2019-10-28 RX ADMIN — DOCUSATE SODIUM 100 MG: 100 CAPSULE, LIQUID FILLED ORAL at 07:58

## 2019-10-28 RX ADMIN — POLYETHYLENE GLYCOL 3350 17 G: 17 POWDER, FOR SOLUTION ORAL at 07:59

## 2019-10-28 RX ADMIN — Medication 7.9 MILLICURIE: at 10:03

## 2019-10-28 RX ADMIN — OXYCODONE HYDROCHLORIDE AND ACETAMINOPHEN 2 TABLET: 5; 325 TABLET ORAL at 11:54

## 2019-10-28 RX ADMIN — Medication 10 ML: at 07:59

## 2019-10-28 RX ADMIN — MEROPENEM 1 G: 1 INJECTION, POWDER, FOR SOLUTION INTRAVENOUS at 05:51

## 2019-10-28 ASSESSMENT — PAIN DESCRIPTION - FREQUENCY
FREQUENCY: CONTINUOUS
FREQUENCY: CONTINUOUS

## 2019-10-28 ASSESSMENT — PAIN DESCRIPTION - LOCATION
LOCATION: ABDOMEN
LOCATION: ABDOMEN

## 2019-10-28 ASSESSMENT — PAIN SCALES - GENERAL
PAINLEVEL_OUTOF10: 3
PAINLEVEL_OUTOF10: 7
PAINLEVEL_OUTOF10: 5
PAINLEVEL_OUTOF10: 7
PAINLEVEL_OUTOF10: 7

## 2019-10-28 ASSESSMENT — PAIN DESCRIPTION - ORIENTATION
ORIENTATION: RIGHT
ORIENTATION: RIGHT

## 2019-10-28 ASSESSMENT — PAIN DESCRIPTION - DESCRIPTORS
DESCRIPTORS: ACHING
DESCRIPTORS: ACHING

## 2019-10-28 ASSESSMENT — PAIN DESCRIPTION - ONSET
ONSET: ON-GOING
ONSET: ON-GOING

## 2019-10-28 ASSESSMENT — PAIN - FUNCTIONAL ASSESSMENT
PAIN_FUNCTIONAL_ASSESSMENT: ACTIVITIES ARE NOT PREVENTED
PAIN_FUNCTIONAL_ASSESSMENT: ACTIVITIES ARE NOT PREVENTED

## 2019-10-28 ASSESSMENT — PAIN DESCRIPTION - PAIN TYPE
TYPE: SURGICAL PAIN
TYPE: SURGICAL PAIN

## 2019-10-29 ENCOUNTER — TELEPHONE (OUTPATIENT)
Dept: FAMILY MEDICINE CLINIC | Age: 69
End: 2019-10-29

## 2019-10-30 LAB
AEROBIC CULTURE: NORMAL
ANAEROBIC CULTURE: NORMAL
GRAM STAIN RESULT: NORMAL

## 2019-11-01 ENCOUNTER — OFFICE VISIT (OUTPATIENT)
Dept: FAMILY MEDICINE CLINIC | Age: 69
End: 2019-11-01
Payer: MEDICARE

## 2019-11-01 VITALS
WEIGHT: 228.1 LBS | DIASTOLIC BLOOD PRESSURE: 68 MMHG | SYSTOLIC BLOOD PRESSURE: 122 MMHG | BODY MASS INDEX: 30.94 KG/M2 | HEART RATE: 80 BPM | RESPIRATION RATE: 16 BRPM

## 2019-11-01 DIAGNOSIS — K85.90 ACUTE PANCREATITIS, UNSPECIFIED COMPLICATION STATUS, UNSPECIFIED PANCREATITIS TYPE: Primary | ICD-10-CM

## 2019-11-01 DIAGNOSIS — J96.01 ACUTE HYPOXEMIC RESPIRATORY FAILURE (HCC): ICD-10-CM

## 2019-11-01 DIAGNOSIS — I10 ESSENTIAL HYPERTENSION: ICD-10-CM

## 2019-11-01 DIAGNOSIS — R73.01 IFG (IMPAIRED FASTING GLUCOSE): ICD-10-CM

## 2019-11-01 DIAGNOSIS — D64.9 NORMOCYTIC ANEMIA: ICD-10-CM

## 2019-11-01 DIAGNOSIS — M10.00 IDIOPATHIC GOUT, UNSPECIFIED CHRONICITY, UNSPECIFIED SITE: ICD-10-CM

## 2019-11-01 DIAGNOSIS — B17.9 ACUTE HEPATITIS: ICD-10-CM

## 2019-11-01 DIAGNOSIS — K81.0 ACUTE GANGRENOUS CHOLECYSTITIS: ICD-10-CM

## 2019-11-01 DIAGNOSIS — E78.00 PURE HYPERCHOLESTEROLEMIA: ICD-10-CM

## 2019-11-01 PROCEDURE — 99495 TRANSJ CARE MGMT MOD F2F 14D: CPT | Performed by: FAMILY MEDICINE

## 2019-11-05 ENCOUNTER — OFFICE VISIT (OUTPATIENT)
Dept: SURGERY | Age: 69
End: 2019-11-05

## 2019-11-05 ENCOUNTER — HOSPITAL ENCOUNTER (OUTPATIENT)
Age: 69
Discharge: HOME | End: 2019-11-05
Payer: MEDICARE

## 2019-11-05 VITALS
HEART RATE: 103 BPM | TEMPERATURE: 98.3 F | DIASTOLIC BLOOD PRESSURE: 74 MMHG | HEIGHT: 72 IN | BODY MASS INDEX: 29.88 KG/M2 | WEIGHT: 220.6 LBS | OXYGEN SATURATION: 97 % | SYSTOLIC BLOOD PRESSURE: 116 MMHG

## 2019-11-05 DIAGNOSIS — Z90.49 S/P CHOLECYSTECTOMY: ICD-10-CM

## 2019-11-05 DIAGNOSIS — D62 ANEMIA DUE TO ACUTE BLOOD LOSS: ICD-10-CM

## 2019-11-05 DIAGNOSIS — D62 ANEMIA DUE TO ACUTE BLOOD LOSS: Primary | ICD-10-CM

## 2019-11-05 LAB
ERYTHROCYTE [DISTWIDTH] IN BLOOD BY AUTOMATED COUNT: 15.2 % (ref 11.5–14.5)
ERYTHROCYTE [DISTWIDTH] IN BLOOD BY AUTOMATED COUNT: 53.6 FL (ref 35–45)
HCT VFR BLD CALC: 36.8 % (ref 42–52)
HEMOGLOBIN: 11.5 GM/DL (ref 14–18)
MCH RBC QN AUTO: 30.3 PG (ref 26–33)
MCHC RBC AUTO-ENTMCNC: 31.3 GM/DL (ref 32.2–35.5)
MCV RBC AUTO: 96.8 FL (ref 80–94)
PLATELET # BLD: 484 THOU/MM3 (ref 130–400)
PMV BLD AUTO: 8.7 FL (ref 9.4–12.4)
RBC # BLD: 3.8 MILL/MM3 (ref 4.7–6.1)
WBC # BLD: 8.1 THOU/MM3 (ref 4.8–10.8)

## 2019-11-05 PROCEDURE — 36415 COLL VENOUS BLD VENIPUNCTURE: CPT

## 2019-11-05 PROCEDURE — 99024 POSTOP FOLLOW-UP VISIT: CPT | Performed by: NURSE PRACTITIONER

## 2019-11-05 PROCEDURE — 85027 COMPLETE CBC AUTOMATED: CPT

## 2019-11-05 ASSESSMENT — ENCOUNTER SYMPTOMS
ROS SKIN COMMENTS: SURGICAL
ABDOMINAL DISTENTION: 0
APNEA: 0
CHEST TIGHTNESS: 0
CHOKING: 0
NAUSEA: 0
RECTAL PAIN: 0
EYE REDNESS: 0
COUGH: 0
WHEEZING: 0
SORE THROAT: 0
DIARRHEA: 0
RHINORRHEA: 0
VOMITING: 0
CONSTIPATION: 0
ABDOMINAL PAIN: 0
COLOR CHANGE: 0
FACIAL SWELLING: 0
EYE DISCHARGE: 0
EYE ITCHING: 0
VOICE CHANGE: 0
ANAL BLEEDING: 0
TROUBLE SWALLOWING: 0
PHOTOPHOBIA: 0
BLOOD IN STOOL: 0
SINUS PRESSURE: 0
BACK PAIN: 0
EYE PAIN: 0
STRIDOR: 0
SHORTNESS OF BREATH: 0

## 2019-11-12 ENCOUNTER — OFFICE VISIT (OUTPATIENT)
Dept: SURGERY | Age: 69
End: 2019-11-12

## 2019-11-12 VITALS
DIASTOLIC BLOOD PRESSURE: 78 MMHG | WEIGHT: 221.8 LBS | RESPIRATION RATE: 20 BRPM | TEMPERATURE: 97.7 F | SYSTOLIC BLOOD PRESSURE: 120 MMHG | BODY MASS INDEX: 30.04 KG/M2 | HEIGHT: 72 IN | OXYGEN SATURATION: 97 % | HEART RATE: 116 BPM

## 2019-11-12 DIAGNOSIS — Z90.49 S/P CHOLECYSTECTOMY: Primary | ICD-10-CM

## 2019-11-12 PROCEDURE — 99024 POSTOP FOLLOW-UP VISIT: CPT | Performed by: NURSE PRACTITIONER

## 2019-11-12 ASSESSMENT — ENCOUNTER SYMPTOMS
SORE THROAT: 0
VOICE CHANGE: 0
SHORTNESS OF BREATH: 0
BACK PAIN: 0
CHEST TIGHTNESS: 0
EYE PAIN: 0
CHOKING: 0
ABDOMINAL DISTENTION: 0
APNEA: 0
EYE ITCHING: 0
COLOR CHANGE: 0
EYE DISCHARGE: 0
RHINORRHEA: 0
WHEEZING: 0
PHOTOPHOBIA: 0
RECTAL PAIN: 0
STRIDOR: 0
SINUS PRESSURE: 0
COUGH: 0
NAUSEA: 0
ABDOMINAL PAIN: 0
FACIAL SWELLING: 0
TROUBLE SWALLOWING: 0
ANAL BLEEDING: 0
CONSTIPATION: 0
BLOOD IN STOOL: 0
DIARRHEA: 0
EYE REDNESS: 0
VOMITING: 0

## 2019-12-12 ENCOUNTER — HOSPITAL ENCOUNTER (OUTPATIENT)
Dept: OCCUPATIONAL THERAPY | Age: 69
Setting detail: THERAPIES SERIES
Discharge: HOME | End: 2019-12-12
Payer: MEDICARE

## 2019-12-12 PROCEDURE — 97165 OT EVAL LOW COMPLEX 30 MIN: CPT

## 2019-12-12 PROCEDURE — 97110 THERAPEUTIC EXERCISES: CPT

## 2019-12-12 ASSESSMENT — PAIN DESCRIPTION - PAIN TYPE: TYPE: SURGICAL PAIN

## 2019-12-12 ASSESSMENT — PAIN SCALES - GENERAL: PAINLEVEL_OUTOF10: 4

## 2019-12-12 ASSESSMENT — PAIN DESCRIPTION - ORIENTATION: ORIENTATION: RIGHT

## 2019-12-12 ASSESSMENT — PAIN DESCRIPTION - LOCATION: LOCATION: SHOULDER

## 2019-12-17 ENCOUNTER — HOSPITAL ENCOUNTER (OUTPATIENT)
Dept: OCCUPATIONAL THERAPY | Age: 69
Setting detail: THERAPIES SERIES
Discharge: HOME | End: 2019-12-17
Payer: MEDICARE

## 2019-12-17 PROCEDURE — 97110 THERAPEUTIC EXERCISES: CPT

## 2019-12-19 ENCOUNTER — HOSPITAL ENCOUNTER (OUTPATIENT)
Dept: OCCUPATIONAL THERAPY | Age: 69
Setting detail: THERAPIES SERIES
Discharge: HOME | End: 2019-12-19
Payer: MEDICARE

## 2019-12-19 PROCEDURE — 97110 THERAPEUTIC EXERCISES: CPT

## 2019-12-24 ENCOUNTER — HOSPITAL ENCOUNTER (OUTPATIENT)
Dept: OCCUPATIONAL THERAPY | Age: 69
Setting detail: THERAPIES SERIES
Discharge: HOME | End: 2019-12-24
Payer: MEDICARE

## 2019-12-24 PROCEDURE — 97110 THERAPEUTIC EXERCISES: CPT

## 2019-12-26 ENCOUNTER — HOSPITAL ENCOUNTER (OUTPATIENT)
Dept: OCCUPATIONAL THERAPY | Age: 69
Setting detail: THERAPIES SERIES
Discharge: HOME | End: 2019-12-26
Payer: MEDICARE

## 2019-12-26 PROCEDURE — 97110 THERAPEUTIC EXERCISES: CPT

## 2019-12-31 ENCOUNTER — HOSPITAL ENCOUNTER (OUTPATIENT)
Dept: OCCUPATIONAL THERAPY | Age: 69
Setting detail: THERAPIES SERIES
Discharge: HOME | End: 2019-12-31
Payer: MEDICARE

## 2019-12-31 PROCEDURE — 97110 THERAPEUTIC EXERCISES: CPT

## 2020-01-02 ENCOUNTER — HOSPITAL ENCOUNTER (OUTPATIENT)
Dept: OCCUPATIONAL THERAPY | Age: 70
Setting detail: THERAPIES SERIES
Discharge: HOME | End: 2020-01-02
Payer: MEDICARE

## 2020-01-02 PROCEDURE — 97110 THERAPEUTIC EXERCISES: CPT

## 2020-01-06 ENCOUNTER — HOSPITAL ENCOUNTER (OUTPATIENT)
Dept: OCCUPATIONAL THERAPY | Age: 70
Setting detail: THERAPIES SERIES
Discharge: HOME | End: 2020-01-06
Payer: MEDICARE

## 2020-01-06 PROCEDURE — 97110 THERAPEUTIC EXERCISES: CPT

## 2020-01-09 ENCOUNTER — HOSPITAL ENCOUNTER (OUTPATIENT)
Dept: OCCUPATIONAL THERAPY | Age: 70
Setting detail: THERAPIES SERIES
Discharge: HOME | End: 2020-01-09
Payer: MEDICARE

## 2020-01-09 PROCEDURE — 97110 THERAPEUTIC EXERCISES: CPT

## 2020-01-13 ENCOUNTER — HOSPITAL ENCOUNTER (OUTPATIENT)
Dept: OCCUPATIONAL THERAPY | Age: 70
Setting detail: THERAPIES SERIES
Discharge: HOME | End: 2020-01-13
Payer: MEDICARE

## 2020-01-13 PROCEDURE — 97110 THERAPEUTIC EXERCISES: CPT

## 2020-01-13 NOTE — PROGRESS NOTES
progressing towards goals. Patient Education:  Patient Education: No changes to HEP, reviewed next scheduled appointment. Plan:  Plan Comment: Continue per established POC.                     MARKO [employer] WETZEL/L #[id number]

## 2020-01-16 ENCOUNTER — HOSPITAL ENCOUNTER (OUTPATIENT)
Dept: OCCUPATIONAL THERAPY | Age: 70
Setting detail: THERAPIES SERIES
Discharge: HOME | End: 2020-01-16
Payer: MEDICARE

## 2020-01-16 PROCEDURE — 97110 THERAPEUTIC EXERCISES: CPT

## 2020-01-16 NOTE — PROGRESS NOTES
[facility]  OUTPATIENT OCCUPATIONAL THERAPY  Daily Note  [address] [facility].    Time In: 0800  Time Out: 0825  Minutes: 25  Timed Code Treatment Minutes: 25 Minutes                Date: 2020  Patient Name: Prabhu Caldwell        CSN: [id number]   : 1950  (81 y.o.)  Gender: male   Referring Practitioner: Manju Jara PA-C  Diagnosis: M75.121, M75.41; right RCT, impingement, SLAP tear, S43.431A          General:  OT Visit Information  Onset Date: 19  OT Insurance Information: Medicare - no ionto, no hot/cold packs  Total # of Visits to Date: 6  Certification Period Expiration Date: 20  Progress Note Counter: PN completed on ; 2/10 for PN  Comments: follow up with referring provider on        Restrictions/Precautions:       Position Activity Restriction  Other position/activity restrictions: HTN, Surgery on  - RCR, BT, SAD, DCR, ED; no resistive elbow flexion x 6 weeks; follow massive RCR, BT protcool. Subjective:  Subjective: States that he has been able to get a little more comfortable in the recliner recently. Reports that he has an appointment with physician later today. Pain:  Patient Currently in Pain: No(No pain at rest today. )       Objective:     Upper Extremity Function  UE AROM: scapular retraction and backward shoulder circles x 15 reps  UE PROM: bilateral table slides for shoulder flexion x 20 reps; pendulums with right UE in all directions x15 reps each; supine PROM to right shoulder for flexion and ER at side to patient tolerance - improved flexion after scapular mobilizations completed  UE Stretching: sidelying right scapular mobilizations completed with tightness present in subscapularis region                                                Activity Tolerance: Additional Comments: Tolerated treatment well. Assessment:  Assessment: Patient is progressing towards goals.  Making nice progress regarding the ROM of his right shoulder    Patient Education:     No new patient education completed this date          Plan:  Plan Comment: Continue per established POC.    Specific instructions for Next Treatment: follow protcol for massive RCR of Dr. Ginny Kovacs (surgery 12/5)                   Michel Styles, OTR/L #[id number]

## 2020-01-20 ENCOUNTER — HOSPITAL ENCOUNTER (OUTPATIENT)
Dept: OCCUPATIONAL THERAPY | Age: 70
Setting detail: THERAPIES SERIES
Discharge: HOME | End: 2020-01-20
Payer: MEDICARE

## 2020-01-20 PROCEDURE — 97110 THERAPEUTIC EXERCISES: CPT

## 2020-01-20 NOTE — PROGRESS NOTES
Activity Tolerance: Additional Comments: Tolerated treatment well. Assessment:  Assessment: Progressing toward goals - PROM continuing to improve during sessions    Patient Education:  Patient Education: Reinforced importance of not overusing right arm, reinforced walking with right arm down at his side            Plan:  Plan Comment: Continue per established POC.    Specific instructions for Next Treatment: follow protcol for massive RCR of Dr. Rajiv Upton (surgery 12/5)                   Krissy Musa, OTR/L #[id number]

## 2020-01-23 ENCOUNTER — HOSPITAL ENCOUNTER (OUTPATIENT)
Dept: OCCUPATIONAL THERAPY | Age: 70
Setting detail: THERAPIES SERIES
Discharge: HOME | End: 2020-01-23
Payer: MEDICARE

## 2020-01-23 PROCEDURE — 97110 THERAPEUTIC EXERCISES: CPT

## 2020-01-23 ASSESSMENT — PAIN SCALES - GENERAL: PAINLEVEL_OUTOF10: 1

## 2020-01-23 ASSESSMENT — PAIN DESCRIPTION - LOCATION: LOCATION: SHOULDER

## 2020-01-23 ASSESSMENT — PAIN DESCRIPTION - ORIENTATION: ORIENTATION: RIGHT

## 2020-01-23 NOTE — PROGRESS NOTES
Education:     No new patient education completed this date          Plan:  Plan Comment: Continue per established POC.    Specific instructions for Next Treatment: follow protcol for massive RCR of Dr. Steve Vargas (surgery 12/5)                   Jazmin Newton, OTR/L #[id number]

## 2020-01-27 ENCOUNTER — HOSPITAL ENCOUNTER (OUTPATIENT)
Dept: OCCUPATIONAL THERAPY | Age: 70
Setting detail: THERAPIES SERIES
Discharge: HOME | End: 2020-01-27
Payer: MEDICARE

## 2020-01-27 PROCEDURE — 97110 THERAPEUTIC EXERCISES: CPT

## 2020-01-27 RX ORDER — ALLOPURINOL 300 MG/1
TABLET ORAL
Qty: 90 TABLET | Refills: 3 | Status: SHIPPED | OUTPATIENT
Start: 2020-01-27 | End: 2020-02-03 | Stop reason: SDUPTHER

## 2020-01-27 RX ORDER — IRBESARTAN AND HYDROCHLOROTHIAZIDE 150; 12.5 MG/1; MG/1
TABLET, FILM COATED ORAL
Qty: 90 TABLET | Refills: 3 | Status: SHIPPED | OUTPATIENT
Start: 2020-01-27 | End: 2020-02-03 | Stop reason: SDUPTHER

## 2020-01-27 RX ORDER — METOPROLOL SUCCINATE 25 MG/1
TABLET, EXTENDED RELEASE ORAL
Qty: 90 TABLET | Refills: 3 | Status: SHIPPED | OUTPATIENT
Start: 2020-01-27 | End: 2020-02-03 | Stop reason: SDUPTHER

## 2020-01-27 NOTE — PROGRESS NOTES
[facility]  OUTPATIENT OCCUPATIONAL THERAPY  Daily Note  [address] [facility].    Time In:   Time Out: [address] [employer]  Minutes: 29  Timed Code Treatment Minutes: 29 Minutes                Date: 2020  Patient Name: Lucinda Hoff        CSN: [id number]   : 1950  (81 y.o.)  Gender: male   Referring Practitioner: Graciela Andrade PA-C  Diagnosis: M75.121, M75.41; right RCT, impingement, SLAP tear, S43.431A          General:  OT Visit Information  Onset Date: 19  OT Insurance Information: Medicare - no ionto, no hot/cold packs  Total # of Visits to Date: 15  Certification Period Expiration Date: 20  Progress Note Counter: PN completed on ; 5/10 for PN  Comments: follow up with referring provider on 3/11       Restrictions/Precautions:       Position Activity Restriction  Other position/activity restrictions: HTN, Surgery on  - RCR, BT, SAD, DCR, ED; no resistive elbow flexion x 6 weeks; follow massive RCR, BT protcool. Subjective:  Subjective: Patient is pleasant and cooperative, reports things are \"going well. \" Has ordered a pulley system for eventual use at home. Pain:  Patient Currently in Pain: No(No pain at rest today. )       Objective:     Upper Extremity Function  UE AROM: Scapular retraction and backward shoulder circles x 15 reps to warm up. UE PROM: Bilateral table slides for shoulder flexion x 20 reps; sidelying IASTM to R upper trap, levator scap, scap border, subscap to decrease tightness, gentle GH and scap mobs. Supine PROM to R shoulder flexion and ER to tolerance. Activity Tolerance: Additional Comments: Tolerated treatment well. Assessment:  Assessment: Progressing toward goals - PROM continues to improve. Will advance protocol on     Patient Education:  Patient Education: Reviewed next scheduled appointment, reviewed protocol.              Plan:  Plan Comment: Continue per established ADALID.                     MARKO WETZEL/ANTON #[id number]

## 2020-01-30 ENCOUNTER — HOSPITAL ENCOUNTER (OUTPATIENT)
Dept: OCCUPATIONAL THERAPY | Age: 70
Setting detail: THERAPIES SERIES
Discharge: HOME | End: 2020-01-30
Payer: MEDICARE

## 2020-01-30 PROCEDURE — 97110 THERAPEUTIC EXERCISES: CPT

## 2020-01-30 NOTE — PROGRESS NOTES
[address] [address]  OUTPATIENT OCCUPATIONAL THERAPY  Daily Note  [facility]    Time In: 0800  Time Out: 0830  Minutes: 30  Timed Code Treatment Minutes: 30 Minutes                Date: 2020  Patient Name: Jyoti Lao        CSN: [id number]   : 1950  (81 y.o.)  Gender: male   Referring Practitioner: Amari Pelaez PA-C  Diagnosis: M75.121, M75.41; right RCT, impingement, SLAP tear, S43.431A          General:  OT Visit Information  Onset Date: 19  OT Insurance Information: Medicare - no ionto, no hot/cold packs  Total # of Visits to Date: 13  Certification Period Expiration Date: 20  Progress Note Counter: PN completed on ; 6/10 for PN  Comments: follow up with referring provider on 3/11       Restrictions/Precautions:       Position Activity Restriction  Other position/activity restrictions: HTN, Surgery on  - RCR, BT, SAD, DCR, ED; no resistive elbow flexion x 6 weeks; follow massive RCR, BT protcool. Subjective:  Subjective: States that his pulley has arrived. States that he is able to reach into upper cupboard to get his medication with his right arm. States that he can lay on his right side for a couple of hours without difficulty. Pain:  Patient Currently in Pain: No       Objective:     Upper Extremity Function  UE AROM: Scapular retraction and backward shoulder circles x 15 reps to  UE PROM: bilateral table slides for shoulder flexion x 20 reps; pulleys for shoulder flexion x 20 reps with good ROM present; supine PROM to right shoulder in all planes to patient tolerance - tightness present in both ER and IR  UE AAROM: seated saeboglide for shoulder flexion and scaption x 15 reps each, supine dowel for shoulder flexion and chest press x10 reps each, standing dowel for shoulder extension and IR up back x 10 reps                                                Activity Tolerance: Additional Comments: Tolerated treatment well. Assessment:       Patient Education:  Patient Education: supine dowel for shoulder flexion and chest press; standing dowel for shoulder extension and IR up back; use of pulleys at home            Plan:  Plan Comment: Continue per established POC.    Specific instructions for Next Treatment: follow protcol for massive RCR of Dr. Ehsan Rosado (surgery 12/5)                   Nel Edwards, OTR/ANTON #[id number]

## 2020-02-03 RX ORDER — ALLOPURINOL 300 MG/1
TABLET ORAL
Qty: 90 TABLET | Refills: 3 | Status: SHIPPED | OUTPATIENT
Start: 2020-02-03 | End: 2021-01-26 | Stop reason: SDUPTHER

## 2020-02-03 RX ORDER — METOPROLOL SUCCINATE 25 MG/1
TABLET, EXTENDED RELEASE ORAL
Qty: 90 TABLET | Refills: 3 | Status: SHIPPED | OUTPATIENT
Start: 2020-02-03 | End: 2021-01-26 | Stop reason: SDUPTHER

## 2020-02-03 RX ORDER — IRBESARTAN AND HYDROCHLOROTHIAZIDE 150; 12.5 MG/1; MG/1
TABLET, FILM COATED ORAL
Qty: 90 TABLET | Refills: 3 | Status: SHIPPED | OUTPATIENT
Start: 2020-02-03 | End: 2021-01-26 | Stop reason: SDUPTHER

## 2020-02-04 ENCOUNTER — HOSPITAL ENCOUNTER (OUTPATIENT)
Dept: OCCUPATIONAL THERAPY | Age: 70
Setting detail: THERAPIES SERIES
Discharge: HOME | End: 2020-02-04
Payer: MEDICARE

## 2020-02-04 PROCEDURE — 97110 THERAPEUTIC EXERCISES: CPT

## 2020-02-06 ENCOUNTER — HOSPITAL ENCOUNTER (OUTPATIENT)
Dept: OCCUPATIONAL THERAPY | Age: 70
Setting detail: THERAPIES SERIES
Discharge: HOME | End: 2020-02-06
Payer: MEDICARE

## 2020-02-06 PROCEDURE — 97110 THERAPEUTIC EXERCISES: CPT

## 2020-02-06 NOTE — PROGRESS NOTES
Plan:  Plan Comment: Continue per established POC.    Specific instructions for Next Treatment: follow protcol for massive RCR of Dr. Wilbur Diez (surgery 12/5)                   Caesar Diane, OTR/L #[id number]

## 2020-02-10 ENCOUNTER — HOSPITAL ENCOUNTER (OUTPATIENT)
Dept: OCCUPATIONAL THERAPY | Age: 70
Setting detail: THERAPIES SERIES
Discharge: HOME | End: 2020-02-10
Payer: MEDICARE

## 2020-02-10 PROCEDURE — 97110 THERAPEUTIC EXERCISES: CPT

## 2020-02-10 NOTE — PROGRESS NOTES
[facility]  OUTPATIENT OCCUPATIONAL THERAPY  Daily Note  [address] [facility].    Time In: 8785  Time Out: 9437  Minutes: 25  Timed Code Treatment Minutes: 25 Minutes                Date: 2/10/2020  Patient Name: aJna Gregg        CSN: [id number]   : 1950  (81 y.o.)  Gender: male   Referring Practitioner: Tianna Diaz PA-C  Diagnosis: M75.121, M75.41; right RCT, impingement, SLAP tear, S43.431A          General:  OT Visit Information  Onset Date: 19  OT Insurance Information: Medicare - no ionto, no hot/cold packs  Total # of Visits to Date: 25  Certification Period Expiration Date: 20  Progress Note Counter: PN completed on ; 9/10 for PN  Comments: follow up with referring provider on 3/11       Restrictions/Precautions:       Position Activity Restriction  Other position/activity restrictions: HTN, Surgery on  - RCR, BT, SAD, DCR, ED; no resistive elbow flexion x 6 weeks; follow massive RCR, BT protcool. Subjective:  Subjective: Pt. states he is doing fine at home, worries about lifting too much weight at home while under constrictions. Pain:  Patient Currently in Pain: No       Objective:     Upper Extremity Function  UE AROM: Scapular retraction and backward shoulder circles x 15 reps   UE PROM: pulleys for shoulder flexion x 20 reps; supine PROM to right shoulder in all planes to patient tolerance - ER tight on this date   UE Isometrics: light pressure isometrics for right shoulder - flexion, extension, abduction, IR, and ER x10 reps each with 5 second hold  UE Strengthing: peach theraband-B rows initiated x10 reps                                                 Activity Tolerance: Additional Comments: Tolerated treatment well. Assessment:  Assessment: Pt. is progressing towards goals. Patient Education:  Patient Education: discussed further progression will be strengthening as we initiated today with peach thermignon.

## 2020-02-13 ENCOUNTER — HOSPITAL ENCOUNTER (OUTPATIENT)
Dept: OCCUPATIONAL THERAPY | Age: 70
Setting detail: THERAPIES SERIES
Discharge: HOME | End: 2020-02-13
Payer: MEDICARE

## 2020-02-13 PROCEDURE — 97110 THERAPEUTIC EXERCISES: CPT

## 2020-02-13 NOTE — FLOWSHEET NOTE
** PLEASE SIGN, DATE AND TIME CERTIFICATION BELOW AND RETURN TO [facility] OUTPATIENT REHABILITATION (FAX #: [phone]). ATTEST/CO-SIGN IF ACCESSING VIA [employer]. THANK YOU.**    I certify that I have examined the patient below and determined that Physical Medicine and Rehabilitation service is necessary and that I approve the established plan of care for up to 90 days or as specifically noted. Attestation, signature or co-signature of physician indicates approval of certification requirements.    ________________________ ____________ __________  Physician Signature   Date   Time      [facility]  OUTPATIENT OCCUPATIONAL THERAPY  Progress Note  [address] [facility]    Time In: 0830  Time Out: 0900  Minutes: 30  Timed Code Treatment Minutes: 30 Minutes                Date: 2020  Patient Name: Oswaldo Garcia        CSN: [id number]   : 1950  (81 y.o.)  Gender: male   Referring Practitioner: Sean Pak PA-C  Diagnosis: M75.121, M75.41; right RCT, impingement, SLAP tear, S43.431A          General:  OT Visit Information  Onset Date: 19  OT Insurance Information: Medicare - no ionto, no hot/cold packs  Total # of Visits to Date:   Certification Period Expiration Date: 20  Progress Note Counter: PN completed on   Comments: follow up with referring provider on 3/11       Restrictions/Precautions:       Position Activity Restriction  Other position/activity restrictions: HTN, Surgery on  - RCR, BT, SAD, DCR, ED; no resistive elbow flexion x 6 weeks; follow massive RCR, BT protcool. Subjective:  Subjective: States that overall he feels as though he is doing fine. States that he is trying to not overuse his right arm. States that he is able to drive, dress, bathe, and reach up with his right arm.            Pain:  Patient Currently in Pain: No       Objective:     Upper Extremity Function  UE AROM: active right shoulder flexion = 148, abduction = 148, IR = can get right thumb 5.5\" above waistband behind back, ER at 90 = 72; biodex at 120 speed x 3 minutes forward and 3 minutes backward - completed for ROM purposes only; active right shoulder flexion, abduction, and scaption x 10 reps each  UE PROM: pulleys for shoulder flexion x 20 reps; bilateral table slides for shoulder flexion x 20 reps;  passive right shoulder flexion = 170, abduction = 172, IR = 54, ER at side = 66, ER at 90 = 80  UE AAROM: supine dowel for shoulder flexion and chest press x 15 reps each; standing dowel for shoulder extension and IR up back x 15 reps each  UE Strengthing: peach theraband - bilateral rows x 10 reps                                                Activity Tolerance: Additional Comments: Tolerated treatment well. Assessment:  Assessment: Patient is making excellent progress toward goals regarding his right shoulder. Patient complains of no pain, but does indicate having some fatigue with certain movements of right UE. Patient's AROM has improved nicely and is exhibiting excellent PROM and AROM of right shoulder. Passive IR of right shoulder is tight. Further skilled therapy services are required to address AROM, passive IR, and strength of right shoulder when allowed per physican protocol to allow patient to complete his normal home and work tasks. Patient Education:  Patient Education: goal status; peach theraband - bilateral rows; active right shoulder flexion, abduction, and scaption            Plan:  Times per week: 2 x week  Plan weeks: 6 weeks  Plan Comment: POC updated and discussed with patient  Specific instructions for Next Treatment: AROM, PROM, AAROM, strengthening to progress per protocol    Patient goals : To get as much use of my arm back as I can. Short term goals  Time Frame for Short term goals: 4 weeks  Short term goal 1: Be independent with HEP as instructed to increase his ability to complete daily tasks.  GOAL MET - CONTINUE GOAL WITH HEP

## 2020-02-17 ENCOUNTER — HOSPITAL ENCOUNTER (OUTPATIENT)
Dept: OCCUPATIONAL THERAPY | Age: 70
Setting detail: THERAPIES SERIES
Discharge: HOME | End: 2020-02-17
Payer: MEDICARE

## 2020-02-17 PROCEDURE — 97110 THERAPEUTIC EXERCISES: CPT

## 2020-02-20 ENCOUNTER — HOSPITAL ENCOUNTER (OUTPATIENT)
Dept: OCCUPATIONAL THERAPY | Age: 70
Setting detail: THERAPIES SERIES
Discharge: HOME | End: 2020-02-20
Payer: MEDICARE

## 2020-02-20 PROCEDURE — 97110 THERAPEUTIC EXERCISES: CPT

## 2020-02-20 NOTE — PROGRESS NOTES
[address] [address]  OUTPATIENT OCCUPATIONAL THERAPY  Daily Note  [facility].    Time In: 1200  Time Out: 1225  Minutes: 25  Timed Code Treatment Minutes: 25 Minutes                Date: 2020  Patient Name: Rufino Caldwell        CSN: [id number]   : 1950  (81 y.o.)  Gender: male   Referring Practitioner: Kiara Kuo PA-C  Diagnosis: M75.121, M75.41; right RCT, impingement, SLAP tear, S43.431A          General:  OT Visit Information  Onset Date: 19  OT Insurance Information: Medicare - no ionto, no hot/cold packs  Total # of Visits to Date: 21  Certification Period Expiration Date: 20  Progress Note Counter: PN completed on ; 2/10 for PN  Comments: follow up with referring provider on 3/11       Restrictions/Precautions:       Position Activity Restriction  Other position/activity restrictions: HTN, Surgery on  - RCR, BT, SAD, DCR, ED; no resistive elbow flexion x 6 weeks; follow massive RCR, BT protcool. Subjective:  Subjective: States that he is looking forward to vacation and will plan to continue with his HEP          Pain:  Patient Currently in Pain: No       Objective:     Upper Extremity Function  UE AROM: supine active right shoulder flexion, horizontal abduction/adduction, ceiling circles x 20 reps each  UE PROM: pulleys for shoulder flexion  UE AAROM: bilateral wall slides x 15 reps for shoulder flexion; seated saeboglide for right shoulder flexion and scaption x 15 reps each  UE Strengthing: orange theraband - bilateral rows x 20 reps, right bicep and tricep strengthening with elbow supported x20 reps each; reaching endurance activity with right UE - placing resitive clothespins on and off vertical post;                                                 Activity Tolerance: Additional Comments: Tolerated treatment well.      Assessment:  Assessment: Progressing toward goals nicely    Patient Education:  Patient Education: to continue

## 2020-03-12 ENCOUNTER — HOSPITAL ENCOUNTER (OUTPATIENT)
Dept: OCCUPATIONAL THERAPY | Age: 70
Setting detail: THERAPIES SERIES
Discharge: HOME | End: 2020-03-12
Payer: MEDICARE

## 2020-03-12 PROCEDURE — 97110 THERAPEUTIC EXERCISES: CPT

## 2020-03-12 NOTE — DISCHARGE SUMMARY
his right UE in all of his normal daily tasks without difficulty. Patient has returned to his normal daily and work tasks. Patient is able to complete his HEP without difficulty. Patient Education:  Patient Education: goal status; theraband exercises - yusra protocol with orange; instructed patient to work up to 20-30 reps with orange and then to switch to green theraband            Plan:  Plan Comment: Discharge from therapy as patient has met goals and is independent with upgraded HEP    Patient goals : To get as much use of my arm back as I can. Short term goals  Time Frame for Short term goals: 4 weeks  Short term goal 1: Be independent with HEP as instructed to increase his ability to complete daily tasks. GOAL MET   Short term goal 2: Increase passive right shoulder IR to at least 60 to increase his ability to reach behind his back. GOAL MET - SEE OBJECTIVE SECTION OF NOTE FOR DETAILS  Short term goal 3:  Report pain going no higher than 2/10 once strengthening is allowed to be initiated to allow him to complete work tasks. 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 GOAL MET  Long term goals  Time Frame for Long term goals : 6 weeks  Long term goal 1: Increase active right shoulder flexion to 160, abduction to 160, get right thumb 6.5\" above waistband behind back,and ER to 80 to increase his ability to perform work related tasks. 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 GOAL MET  Long term goal 3:  Be able to shovel snow using both UE without difficulty. GOAL MET  Long term goal 4:  Be able to use right arm to place groceries from cart to car without difficulty or pain.  GOAL MET    UEFS Score: 91.25    Michel Styles, OTR/L #[id number]

## 2020-10-18 ENCOUNTER — PATIENT MESSAGE (OUTPATIENT)
Dept: FAMILY MEDICINE CLINIC | Age: 70
End: 2020-10-18

## 2020-10-19 NOTE — TELEPHONE ENCOUNTER
From: Jose Antonio Glez  To: Daniella Ferguson DO  Sent: 10/18/2020 1:21 PM EDT  Subject: Non-Urgent Medical Question    Dr. Tiffany Yarbrough,  I am overdue for my annual physical. I was wondering if I could just get an order for the blood work, instead of having to come to your office? I do check my blood pressure at home, from to time, and it is good. I would rather not have to come in if I don't have to, with covid going on.     Thank you,  Joshua Stewart

## 2020-10-22 ENCOUNTER — HOSPITAL ENCOUNTER (OUTPATIENT)
Age: 70
Discharge: HOME | End: 2020-10-22
Payer: MEDICARE

## 2020-10-22 DIAGNOSIS — D64.9 NORMOCYTIC ANEMIA: ICD-10-CM

## 2020-10-22 DIAGNOSIS — R73.01 IFG (IMPAIRED FASTING GLUCOSE): ICD-10-CM

## 2020-10-22 DIAGNOSIS — E78.00 PURE HYPERCHOLESTEROLEMIA: ICD-10-CM

## 2020-10-22 DIAGNOSIS — M10.00 IDIOPATHIC GOUT, UNSPECIFIED CHRONICITY, UNSPECIFIED SITE: ICD-10-CM

## 2020-10-22 LAB
ALBUMIN SERPL-MCNC: 4.1 G/DL (ref 3.5–5.1)
ALP BLD-CCNC: 92 U/L (ref 38–126)
ALT SERPL-CCNC: 32 U/L (ref 11–66)
ANION GAP SERPL CALCULATED.3IONS-SCNC: 11 MEQ/L (ref 8–16)
AST SERPL-CCNC: 36 U/L (ref 5–40)
AVERAGE GLUCOSE: 117 MG/DL (ref 70–126)
BASOPHILS # BLD: 0.4 %
BASOPHILS ABSOLUTE: 0 THOU/MM3 (ref 0–0.1)
BILIRUB SERPL-MCNC: 0.9 MG/DL (ref 0.3–1.2)
BUN BLDV-MCNC: 16 MG/DL (ref 7–22)
CALCIUM SERPL-MCNC: 9.4 MG/DL (ref 8.5–10.5)
CHLORIDE BLD-SCNC: 105 MEQ/L (ref 98–111)
CHOLESTEROL, TOTAL: 160 MG/DL (ref 100–199)
CO2: 26 MEQ/L (ref 23–33)
CREAT SERPL-MCNC: 0.7 MG/DL (ref 0.4–1.2)
EOSINOPHIL # BLD: 1.3 %
EOSINOPHILS ABSOLUTE: 0.1 THOU/MM3 (ref 0–0.4)
ERYTHROCYTE [DISTWIDTH] IN BLOOD BY AUTOMATED COUNT: 14.1 % (ref 11.5–14.5)
ERYTHROCYTE [DISTWIDTH] IN BLOOD BY AUTOMATED COUNT: 50.5 FL (ref 35–45)
GFR SERPL CREATININE-BSD FRML MDRD: > 90 ML/MIN/1.73M2
GLUCOSE BLD-MCNC: 107 MG/DL (ref 70–108)
HBA1C MFR BLD: 5.9 % (ref 4.4–6.4)
HCT VFR BLD CALC: 49.2 % (ref 42–52)
HDLC SERPL-MCNC: 44 MG/DL
HEMOGLOBIN: 15.9 GM/DL (ref 14–18)
IMMATURE GRANS (ABS): 0.02 THOU/MM3 (ref 0–0.07)
IMMATURE GRANULOCYTES: 0.3 %
LDL CHOLESTEROL CALCULATED: 81 MG/DL
LYMPHOCYTES # BLD: 21.7 %
LYMPHOCYTES ABSOLUTE: 1.6 THOU/MM3 (ref 1–4.8)
MCH RBC QN AUTO: 31.5 PG (ref 26–33)
MCHC RBC AUTO-ENTMCNC: 32.3 GM/DL (ref 32.2–35.5)
MCV RBC AUTO: 97.4 FL (ref 80–94)
MONOCYTES # BLD: 8.8 %
MONOCYTES ABSOLUTE: 0.7 THOU/MM3 (ref 0.4–1.3)
NUCLEATED RED BLOOD CELLS: 0 /100 WBC
PLATELET # BLD: 203 THOU/MM3 (ref 130–400)
PMV BLD AUTO: 10.9 FL (ref 9.4–12.4)
POTASSIUM SERPL-SCNC: 4.1 MEQ/L (ref 3.5–5.2)
RBC # BLD: 5.05 MILL/MM3 (ref 4.7–6.1)
SEG NEUTROPHILS: 67.5 %
SEGMENTED NEUTROPHILS ABSOLUTE COUNT: 5 THOU/MM3 (ref 1.8–7.7)
SODIUM BLD-SCNC: 142 MEQ/L (ref 135–145)
TOTAL PROTEIN: 7.1 G/DL (ref 6.1–8)
TRIGL SERPL-MCNC: 176 MG/DL (ref 0–199)
TSH SERPL DL<=0.05 MIU/L-ACNC: 3.27 UIU/ML (ref 0.4–4.2)
URIC ACID: 5.3 MG/DL (ref 3.7–7)
WBC # BLD: 7.4 THOU/MM3 (ref 4.8–10.8)

## 2020-10-22 PROCEDURE — 84443 ASSAY THYROID STIM HORMONE: CPT

## 2020-10-22 PROCEDURE — 80053 COMPREHEN METABOLIC PANEL: CPT

## 2020-10-22 PROCEDURE — 84550 ASSAY OF BLOOD/URIC ACID: CPT

## 2020-10-22 PROCEDURE — 80061 LIPID PANEL: CPT

## 2020-10-22 PROCEDURE — 85025 COMPLETE CBC W/AUTO DIFF WBC: CPT

## 2020-10-22 PROCEDURE — 83036 HEMOGLOBIN GLYCOSYLATED A1C: CPT

## 2020-10-22 PROCEDURE — 36415 COLL VENOUS BLD VENIPUNCTURE: CPT

## 2020-11-09 ASSESSMENT — LIFESTYLE VARIABLES
AUDIT-C TOTAL SCORE: INCOMPLETE
AUDIT TOTAL SCORE: INCOMPLETE
HOW OFTEN DO YOU HAVE A DRINK CONTAINING ALCOHOL: 0
HOW OFTEN DO YOU HAVE A DRINK CONTAINING ALCOHOL: NEVER

## 2020-11-09 ASSESSMENT — PATIENT HEALTH QUESTIONNAIRE - PHQ9
2. FEELING DOWN, DEPRESSED OR HOPELESS: 0
1. LITTLE INTEREST OR PLEASURE IN DOING THINGS: 0
SUM OF ALL RESPONSES TO PHQ QUESTIONS 1-9: 0
SUM OF ALL RESPONSES TO PHQ9 QUESTIONS 1 & 2: 0

## 2020-11-13 ENCOUNTER — VIRTUAL VISIT (OUTPATIENT)
Dept: FAMILY MEDICINE CLINIC | Age: 70
End: 2020-11-13
Payer: MEDICARE

## 2020-11-13 PROCEDURE — 1123F ACP DISCUSS/DSCN MKR DOCD: CPT | Performed by: FAMILY MEDICINE

## 2020-11-13 PROCEDURE — G0439 PPPS, SUBSEQ VISIT: HCPCS | Performed by: FAMILY MEDICINE

## 2020-11-13 PROCEDURE — 3017F COLORECTAL CA SCREEN DOC REV: CPT | Performed by: FAMILY MEDICINE

## 2020-11-13 PROCEDURE — 4040F PNEUMOC VAC/ADMIN/RCVD: CPT | Performed by: FAMILY MEDICINE

## 2020-11-13 PROCEDURE — G8484 FLU IMMUNIZE NO ADMIN: HCPCS | Performed by: FAMILY MEDICINE

## 2020-11-13 NOTE — PROGRESS NOTES
Subjective:      Patient ID: Jonathan Gallegos is a 81 y.o. male. HPI:    Chief Complaint   Patient presents with    Medicare AWV       Jonathan Gallegos (:  1950) has requested an audio/video evaluation for the following concern(s):    AMW. Pt doing very well, no concerns. Per pt, BPs have all looked good at home. BP Readings from Last 3 Encounters:   19 120/78   19 116/74   19 122/68     Weight is stable from this time last year. Wt Readings from Last 3 Encounters:   19 221 lb 12.8 oz (100.6 kg)   19 220 lb 9.6 oz (100.1 kg)   19 228 lb 1.6 oz (103.5 kg)     Hx of gout, no recent attacks. He is on allopurinol. Patient Active Problem List   Diagnosis    HTN (hypertension)    Gout    Legionella pneumonia ([facility] [address]    Chondromalacia of right knee    Derangement of posterior horn of medial meniscus of right knee    Derangement of lateral meniscus of right knee    S/P cholecystectomy     Past Surgical History:   Procedure Laterality Date    CHOLECYSTECTOMY, LAPAROSCOPIC N/A 10/21/2019    CHOLECYSTECTOMY LAPAROSCOPIC, with conversion to open performed by Ismael Martell MD at [address] [place] [address]  2017    [facility]     ELBOW SURGERY Right     ELECTROCARDIOGRAM  2018    JOINT REPLACEMENT      OTHER SURGICAL HISTORY  10/13/2015    RIGHT KNEE ARTHROSCOPY     Prior to Admission medications    Medication Sig Start Date End Date Taking?  Authorizing Provider   allopurinol (ZYLOPRIM) 300 MG tablet TAKE 1 TABLET EVERY DAY 2/3/20   Cami Ferguson DO   irbesartan-hydrochlorothiazide (AVALIDE) 150-12.5 MG per tablet TAKE 1 TABLET EVERY DAY 2/3/20   Cami Ferguson DO   metoprolol succinate (TOPROL XL) 25 MG extended release tablet TAKE 1 TABLET EVERY DAY 2/3/20   Cami Ferguson DO   ibuprofen (ADVIL;MOTRIN) 800 MG tablet Take 1 tablet by mouth every 8 hours as needed for Pain 10/28/19   LIZ Siddiqui - CNP   docusate ([address], test (Diabetic or Prediabetic)  10/22/2021    Potassium monitoring  10/22/2021    Creatinine monitoring  10/22/2021    Lipid screen  10/22/2025    Colon cancer screen colonoscopy  01/16/2027    Pneumococcal 65+ years Vaccine  Completed    Hepatitis A vaccine  Aged Out    Hepatitis B vaccine  Aged Out    Hib vaccine  Aged Out    Meningococcal (ACWY) vaccine  Aged [employer] History   Administered Date(s) Administered    Influenza, High Dose (Fluzone 65 yrs and older) 02/01/2018    Influenza, Quadv, IM, PF (6 mo and older Fluzone, Flulaval, Fluarix, and 3 yrs and older Afluria) 10/18/2019    Pneumococcal Conjugate 13-valent ([id number]) 02/01/2018    Pneumococcal Polysaccharide ([id number]) 03/07/2019           Review of Systems   Constitutional: Negative. HENT: Negative. Respiratory: Negative. Cardiovascular: Negative. Gastrointestinal: Negative. Musculoskeletal: Negative. All other systems reviewed and are negative. Objective:   Physical Exam  Constitutional:       General: He is not in acute distress. Appearance: Normal appearance. He is well-developed. He is not ill-appearing. HENT:      Head: Normocephalic and atraumatic. Right Ear: External ear normal.      Left Ear: External ear normal.   Eyes:      Conjunctiva/sclera: Conjunctivae normal.   Pulmonary:      Effort: Pulmonary effort is normal. No respiratory distress. Skin:     Findings: No rash (on exposed surfaces). Neurological:      Mental Status: He is alert and oriented to person, place, and time. Psychiatric:         Mood and Affect: Mood normal.         Behavior: Behavior normal.         Thought Content: Thought content normal.         Judgment: Judgment normal.         Assessment:       Diagnosis Orders   1. Routine general medical examination at a health care facility     2. Idiopathic gout, unspecified chronicity, unspecified site     3. Pure hypercholesterolemia     4.  IFG (impaired fasting glucose)     5. Essential hypertension             Plan:      -  Chronic medical problems stable  -  Labs reviewed, look fine  -  Continue current medications  -  RTO annually          Laura Casas DO  Medicare Annual Wellness Visit  Name: Mayra Ronquillo Date: 2020   MRN: [id number] Sex: Male   Age: 81 y.o. Ethnicity: Non-/Non    : 1950 Race: Lesley Patel is here for Medicare AWV    Screenings for behavioral, psychosocial and functional/safety risks, and cognitive dysfunction are all negative except as indicated below. These results, as well as other patient data from the [facility] [address] form, are documented in Flowsheets linked to this Encounter. No Known Allergies    Prior to Visit Medications    Medication Sig Taking?  Authorizing Provider   allopurinol (ZYLOPRIM) 300 MG tablet TAKE 1 TABLET EVERY DAY  Laura Casas, DO   irbesartan-hydrochlorothiazide (AVALIDE) 150-12.5 MG per tablet TAKE 1 TABLET EVERY DAY  Laura Casas, DO   metoprolol succinate (TOPROL XL) 25 MG extended release tablet TAKE 1 TABLET EVERY DAY  Laura Casas, DO   ibuprofen (ADVIL;MOTRIN) 800 MG tablet Take 1 tablet by mouth every 8 hours as needed for Pain  LIZ Clark - CNP   docusate (COLACE, DULCOLAX) 100 MG CAPS Take 100 mg by mouth 2 times daily  Heide Kinsey DO   melatonin 3 MG TABS tablet Take 1 tablet by mouth nightly  Heide Kinsey DO   chlorproMAZINE (THORAZINE) 25 MG tablet Take 1 tablet by mouth 3 times daily as needed (Hiccups)  Heide Kinsey DO   acetaminophen (TYLENOL) 325 MG tablet Take 650 mg by mouth every 6 hours as needed for Pain  Historical Provider, MD   Omega-3 Fatty Acids (FISH OIL) 1000 MG CAPS Take by mouth daily 2 tab  Historical Provider, MD       Past Medical History:   Diagnosis Date    Hypertension     Legionnaire's disease ([facility] [address] [facility]        Past Surgical History:   Procedure Laterality Date    CHOLECYSTECTOMY, LAPAROSCOPIC N/A 10/21/2019    CHOLECYSTECTOMY LAPAROSCOPIC, with conversion to open performed by Haylie Encinas [facility]  01/2017    [facility]     ELBOW SURGERY Right     ELECTROCARDIOGRAM  01/23/2018    JOINT REPLACEMENT      OTHER SURGICAL HISTORY  10/13/2015    RIGHT KNEE ARTHROSCOPY       Family History   Problem Relation Age of Onset    Diabetes Paternal Aunt     Parkinsonism Paternal Uncle     Cancer Sister         lung    Heart Disease Neg Hx     High Blood Pressure Neg Hx     Stroke Neg Hx        CareTeam (Including outside providers/suppliers regularly involved in providing care):   Patient Care Team:  Jose Hernandez DO as PCP - General (Family Medicine)  Jose Hernandez DO as PCP - [facility] Empaneled Provider  Abigail Marks MD as Orthopedic Surgeon (Orthopedic Surgery)  Haylie Encinas MD as Surgeon (General Surgery)    Wt Readings from Last 3 Encounters:   11/12/19 221 lb 12.8 oz (100.6 kg)   11/05/19 220 lb 9.6 oz (100.1 kg)   11/01/19 228 lb 1.6 oz (103.5 kg)     No flowsheet data found. There is no height or weight on file to calculate BMI. Based upon direct observation of the patient, evaluation of cognition reveals recent and remote memory intact. Patient's complete Health Risk Assessment and screening values have been reviewed and are found in Flowsheets. The following problems were reviewed today and where indicated follow up appointments were made and/or referrals ordered. Positive Risk Factor Screenings with Interventions:     General Health and ACP:  General  In general, how would you say your health is?: Excellent  In the past 7 days, have you experienced any of the following?  New or Increased Pain, New or Increased Fatigue, Loneliness, Social Isolation, Stress or Anger?: None of These  Do you get the social and emotional support that you need?: Yes  Do you have a Living Will?: Yes  Advance Directives Power of [employer] Living Will ACP-Advance Directive ACP-Power of     Not on File Not on File Filed [address] [place] Risk Interventions:  · NA    Health Habits/Nutrition:  Health Habits/Nutrition  Do you exercise for at least 20 minutes 2-3 times per week?: Yes  Have you lost any weight without trying in the past 3 months?: (!) Yes  Do you eat fewer than 2 meals per day?: No  Have you seen a dentist within the past year?: Yes     Health Habits/Nutrition Interventions:  · Nutritional issues:  educational materials for healthy, well-balanced diet provided    Hearing/Vision:  No exam data present  Hearing/Vision  Do you or your family notice any trouble with your hearing?: (!) Yes  Do you have difficulty driving, watching TV, or doing any of your daily activities because of your eyesight?: No  Have you had an eye exam within the past year?: (!) No  Hearing/Vision Interventions:  · Hearing concerns:  patient declines any further evaluation/treatment for hearing issues    Safety:  Safety  Do you have working smoke detectors?: Yes  Have all throw rugs been removed or fastened?: Yes  Do you have non-slip mats or surfaces in all bathtubs/showers?: (!) No  Do all of your stairways have a railing or banister?: Yes  Are your doorways, halls and stairs free of clutter?: Yes  Do you always fasten your seatbelt when you are in a car?: Yes  Safety Interventions:  · Home safety tips provided    Personalized Preventive Plan   Current Health Maintenance Status  Immunization History   Administered Date(s) Administered    Influenza, High Dose (Fluzone 65 yrs and older) 02/01/2018    Influenza, Quadv, IM, PF (6 mo and older Fluzone, Flulaval, Fluarix, and 3 yrs and older Afluria) 10/18/2019    Pneumococcal Conjugate 13-valent ([id number]) 02/01/2018    Pneumococcal Polysaccharide ([id number]) 03/07/2019        Health Maintenance   Topic Date Due    Hepatitis C screen  1950    DTaP/Tdap/Td vaccine (1 - Tdap) medical treatment and/or call 911 if deemed necessary. Patient identification was verified at the start of the visit: Yes    Services were provided through a video synchronous discussion virtually to substitute for in-person clinic visit. Patient and provider were located at their individual homes. --Jose Hernandez DO on 11/17/2020 at 5:40 PM    An electronic signature was used to authenticate this note.

## 2020-11-17 ASSESSMENT — ENCOUNTER SYMPTOMS
RESPIRATORY NEGATIVE: 1
GASTROINTESTINAL NEGATIVE: 1

## 2021-01-26 ENCOUNTER — PATIENT MESSAGE (OUTPATIENT)
Dept: FAMILY MEDICINE CLINIC | Age: 71
End: 2021-01-26

## 2021-01-26 RX ORDER — METOPROLOL SUCCINATE 25 MG/1
TABLET, EXTENDED RELEASE ORAL
Qty: 90 TABLET | Refills: 3 | Status: SHIPPED | OUTPATIENT
Start: 2021-01-26 | End: 2022-01-31 | Stop reason: SDUPTHER

## 2021-01-26 RX ORDER — ALLOPURINOL 300 MG/1
TABLET ORAL
Qty: 90 TABLET | Refills: 3 | Status: SHIPPED | OUTPATIENT
Start: 2021-01-26 | End: 2021-01-28 | Stop reason: SDUPTHER

## 2021-01-26 RX ORDER — IRBESARTAN AND HYDROCHLOROTHIAZIDE 150; 12.5 MG/1; MG/1
TABLET, FILM COATED ORAL
Qty: 90 TABLET | Refills: 3 | Status: SHIPPED | OUTPATIENT
Start: 2021-01-26 | End: 2021-01-28 | Stop reason: SDUPTHER

## 2021-01-26 NOTE — TELEPHONE ENCOUNTER
From: Rony Anne  To: Ozzy Rivera DO  Sent: 1/26/2021 9:58 AM EST  Subject: Prescription Question    I need refills on all my prescriptions. Could you send them to [facility] on [address]?   Thank you,  Emelyn Sabillon

## 2021-01-28 ENCOUNTER — PATIENT MESSAGE (OUTPATIENT)
Dept: FAMILY MEDICINE CLINIC | Age: 71
End: 2021-01-28

## 2021-01-28 RX ORDER — IRBESARTAN AND HYDROCHLOROTHIAZIDE 150; 12.5 MG/1; MG/1
TABLET, FILM COATED ORAL
Qty: 90 TABLET | Refills: 3 | Status: SHIPPED | OUTPATIENT
Start: 2021-01-28 | End: 2022-01-31 | Stop reason: SDUPTHER

## 2021-01-28 RX ORDER — ALLOPURINOL 300 MG/1
TABLET ORAL
Qty: 90 TABLET | Refills: 3 | Status: SHIPPED | OUTPATIENT
Start: 2021-01-28 | End: 2022-01-31 | Stop reason: SDUPTHER

## 2021-01-28 NOTE — TELEPHONE ENCOUNTER
From: Juhi Peres  To: Yaneth North DO  Sent: 1/28/2021 8:39 AM EST  Subject: Non-Urgent Medical Question    I also need refills on my irbesar/[id number] and allopurinol. Could you get them sent to [facility]? I was able to  my metoprolol.   Thank you,  Ryan Rhodes

## 2021-02-02 ENCOUNTER — HOSPITAL ENCOUNTER (OUTPATIENT)
Age: 71
Discharge: HOME | End: 2021-02-02
Payer: MEDICARE

## 2021-02-02 ENCOUNTER — TELEPHONE (OUTPATIENT)
Dept: FAMILY MEDICINE CLINIC | Age: 71
End: 2021-02-02

## 2021-02-02 DIAGNOSIS — Z20.822 ENCOUNTER FOR LABORATORY TESTING FOR COVID-19 VIRUS: ICD-10-CM

## 2021-02-02 DIAGNOSIS — Z20.822 ENCOUNTER FOR LABORATORY TESTING FOR COVID-19 VIRUS: Primary | ICD-10-CM

## 2021-02-02 PROCEDURE — 86769 SARS-COV-2 COVID-19 ANTIBODY: CPT

## 2021-02-02 PROCEDURE — 36415 COLL VENOUS BLD VENIPUNCTURE: CPT

## 2021-02-02 NOTE — TELEPHONE ENCOUNTER
Yes, he should get the vaccine when it's available to him. That being said, he could also wait the 90 days from diagnosis if he'd like. Does he want an antibody order?

## 2021-02-02 NOTE — TELEPHONE ENCOUNTER
Patient notified and understanding voiced. Would like to have antibody testing done at [facility]. Will go later today or tomorrow. Please generate orders.   Patient desires to wait 90 days from + COVID to get vaccine

## 2021-02-03 LAB — SARS-COV-2 ANTIBODY, TOTAL: POSITIVE

## 2022-01-31 RX ORDER — IRBESARTAN AND HYDROCHLOROTHIAZIDE 150; 12.5 MG/1; MG/1
TABLET, FILM COATED ORAL
Qty: 90 TABLET | Refills: 3 | Status: SHIPPED | OUTPATIENT
Start: 2022-01-31

## 2022-01-31 RX ORDER — METOPROLOL SUCCINATE 25 MG/1
TABLET, EXTENDED RELEASE ORAL
Qty: 90 TABLET | Refills: 3 | Status: SHIPPED | OUTPATIENT
Start: 2022-01-31

## 2022-01-31 RX ORDER — ALLOPURINOL 300 MG/1
TABLET ORAL
Qty: 90 TABLET | Refills: 3 | Status: SHIPPED | OUTPATIENT
Start: 2022-01-31

## 2022-01-31 NOTE — TELEPHONE ENCOUNTER
Patient in [facility] until March and requesting refill of allopurinol, Irbesartan-HCTZ, and Metoprolol  to [facility]. Please refill if appropriate.   Will check with pharmacy after 2pm.

## 2022-09-15 LAB
BUN BLDV-MCNC: 17 MG/DL
CALCIUM SERPL-MCNC: 9.4 MG/DL
CHLORIDE BLD-SCNC: 105 MMOL/L
CO2: 24 MMOL/L
CREAT SERPL-MCNC: 0.7 MG/DL
GFR CALCULATED: 90
GLUCOSE BLD-MCNC: 82 MG/DL
POTASSIUM SERPL-SCNC: 3.7 MMOL/L
SODIUM BLD-SCNC: 140 MMOL/L

## 2022-10-03 ENCOUNTER — HOSPITAL ENCOUNTER (OUTPATIENT)
Dept: OCCUPATIONAL THERAPY | Age: 72
Setting detail: THERAPIES SERIES
Discharge: HOME | End: 2022-10-03
Payer: MEDICARE

## 2022-10-03 PROCEDURE — 97167 OT EVAL HIGH COMPLEX 60 MIN: CPT

## 2022-10-03 PROCEDURE — 97110 THERAPEUTIC EXERCISES: CPT

## 2022-10-03 NOTE — PROGRESS NOTES
** PLEASE SIGN, DATE AND TIME CERTIFICATION BELOW AND RETURN TO [facility] OUTPATIENT REHABILITATION (FAX #: [phone]). ATTEST/CO-SIGN IF ACCESSING VIA IN"Mobile Location, IP". THANK YOU.**    I certify that I have examined the patient below and determined that Physical Medicine and Rehabilitation service is necessary and that I approve the established plan of care for up to 90 days or as specifically noted. Attestation, signature or co-signature of physician indicates approval of certification requirements.    ________________________ ____________ __________  Physician Signature   Date   Time   [address]Y  [x] EVALUATION  [] DAILY NOTE (LAND) [] DAILY NOTE (AQUATIC ) [] PROGRESS NOTE [] DISCHARGE NOTE    [] [facility]   [] [address]    [x] [address] [facility]   [] Martinbrittni Glass    Date: 10/3/2022  Patient Name:  Prabhakar Herbert  : 1950  MRN: [id number]  CSN: [id number]    Referring Practitioner Trenton Hudson MD   Diagnosis Complete rotator cuff tear or rupture of left shoulder, not specified as traumatic [M75.122]  Superior glenoid labrum lesion of left shoulder, initial encounter [Y24.935F]    Treatment Diagnosis Decreased ROM L UE, increased pain L UE, decreased ADLs   Date of Evaluation 10/3/22      Functional Outcome Measure Used UEFS   Functional Outcome Score 6/80 (10/3/22)       Insurance: Primary: Payor: MEDICARE /  /  / ,   Secondary: [facility]   Authorization Information: PRECERTIFICATION REQUIRED:  No  INSURANCE THERAPY BENEFIT:  Patient has unlimited visits based on medical necessity  Benefit will not cover maintenance or preventative treatment.   AQUATIC THERAPY COVERED:   Yes  MODALITIES COVERED:  Yes, with the exception of iontophoresis and hot packs/cold packs  TELEHEALTH COVERED: Yes   Visit # 1, 1/10 for progress note   Visits Allowed:    Recertification Date:    Physician Follow-Up: 10/12/22 Physician Orders: Follow protocol for massive RCR, BT, no resistive elbow flexion x 6 weeks   Pertinent History:  Patient reports that his shoulder gave out in January of 2022. Patient underwent surgery on 9/28/22 for arthroscopic RCR, BT, SAD, DCR, ED.      has a past medical history of Chondromalacia of right knee, Hypertension, and Legionnaire's disease ([address] SUBJECTIVE: Patient reports that he is aware of signs and symptoms of infection, able to list them. Reports no signs or symptoms at this time. Social/Functional History:  Electronic Medical Record reviewed. Marta Nolasco lives with spouse. 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 Sensation Reports tingling/numbness continues in the thumb, not sure if it is due to the sling. Education provided regarding removal of strap if needed.        Coordination Reports that he buttoned his own shirt today, able to oppose to the 4th digit, difficulty opposing to the 5th digit (that was prior to surgery also)           LEFT UPPER EXTREMITY  RANGE OF MOTION    AROM PROM COMMENTS         Shoulder Flexion  120    Shoulder Extension      Shoulder Abduction      Shoulder Adduction      Shoulder External Rotation  33    Shoulder Internal Rotation      Shoulder Range of Motion is [facility] [place]  []      Elbow Flexion      Elbow Extension      Forearm Pronation      Forearm Supination      Elbow Range of Motion is [facility]  [x]      Wrist Flexion      Wrist Extension      Wrist Radial Deviation      Wrist Ulnar Deviation Wrist Range of Motion is [facility]  [x]   If no measurement is recorded, no formal assessment was completed for that motion. TREATMENT   Precautions: Goes by March Current, surgery date 9/28/22, follow protocol for massive RCR, BT, no resistive elbow flexion x 6 weeks   Pain:  6/10 in the left shoulder    X in shaded column indicates Activity Completed Today   Modalities Parameters/  Location  Notes/Comments                     Manual Therapy Time/  Technique  Notes/Comments                     Exercises   Sets/  Sec Reps  Notes/Comments   Distal AROM   x    Scapular pinches, depression   x    Side to side pendulums       Supine PROM left shoulder flexion and ER below protocol limitations   x                         Activities Time    Notes/Comments   Education for passive dressing and bathing  x                  Specific Interventions Next Treatment: follow protocol for massive RCR, BT, no resisitve elbow flexion x 6 weeks    Activity/Treatment Tolerance:  [x]  Patient tolerated treatment well  []  Patient limited by fatigue  []  Patient limited by pain   []  Patient limited by other medical complications  []  Other:     Assessment: Patient presents to the clinic s/p left RCR. At this time, patient is not able to perform activity in the home without assist of another. Patient requires skilled therapy to increase ROM, decrease pain, and eventual strengthening for return to previous functional level. Areas for Improvement: impaired ROM, impaired strength, and pain  Prognosis: good    GOALS:  Patient Goal: return to previous functional level, return to part time jobs     Short Term Goals:  Time Frame: 4 weeks   Patient will increase left shoulder PROM to 140 degrees flexion and 50 degrees ER (when allowed by protocol) to increase ease and donning and doffing shirt. Patient will report pain with activity no greater than 4/10 to increase ease with bathing routine.   Patient will be independent with HEP to increase ability for eventual return to work. 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 Patient will reach into upper cupboard to gather dishes for eating without difficulty. Patient Education:   [x]  HEP/Education Completed: Plan of Care, Goals  HEP: scapular pinches and depression, side to side pendulums, distal AROM  []  No new Education completed  []  Reviewed Prior HEP      [x]  Patient verbalized and/or demonstrated understanding of education provided. []  Patient unable to verbalize and/or demonstrate understanding of education provided. Will continue education. [x]  Barriers to learning: none    PLAN:  Treatment Recommendations: Strengthening, Range of Motion, Manual Therapy - Soft Tissue Mobilization, Manual Therapy - Joint Manipulation, Home Exercise Program, Patient Education, Integrative Dry Needling, and Modalities    [x]  Plan of care initiated. Plan to see patient 2 times per week for 12 weeks to address the treatment planned outlined above.   []  Continue with current plan of care  []  Modify plan of care as follows:    []  Hold pending physician visit  []  Discharge    Time In 1415   Time Out 1515   Timed Code Minutes: 15 min   Total Treatment Time: 60 min       Electronically Signed by: Yarely Pittman, MOT, OTR/L [id number]

## 2022-10-05 ENCOUNTER — HOSPITAL ENCOUNTER (OUTPATIENT)
Dept: OCCUPATIONAL THERAPY | Age: 72
Setting detail: THERAPIES SERIES
Discharge: HOME | End: 2022-10-05
Payer: MEDICARE

## 2022-10-05 PROCEDURE — 97110 THERAPEUTIC EXERCISES: CPT

## 2022-10-05 PROCEDURE — 97140 MANUAL THERAPY 1/> REGIONS: CPT

## 2022-10-05 NOTE — PROGRESS NOTES
[address] THER[address]Y  [] EVALUATION  [x] DAILY NOTE (LAND) [] DAILY NOTE (AQUATIC ) [] PROGRESS NOTE [] DISCHARGE NOTE    [] [facility]   [] [address]    [x] [facility]   [] [facility]    Date: 10/5/2022  Patient Name:  Conor Perez  : 1950  MRN: [id number]  CSN: [id number]    Referring Practitioner Aaliyah Mesa MD   Diagnosis Complete rotator cuff tear or rupture of left shoulder, not specified as traumatic [M75.122]  Superior glenoid labrum lesion of left shoulder, initial encounter [C58.807T]    Treatment Diagnosis Decreased ROM L UE, increased pain L UE, decreased ADLs   Date of Evaluation 10/3/22      Functional Outcome Measure Used UEFS   Functional Outcome Score  (10/3/22)       Insurance: Primary: Payor: MEDICARE /  /  / ,   Secondary: [facility]   Authorization Information: PRECERTIFICATION REQUIRED:  No  INSURANCE THERAPY BENEFIT:  Patient has unlimited visits based on medical necessity  Benefit will not cover maintenance or preventative treatment. AQUATIC THERAPY COVERED:   Yes  MODALITIES COVERED:  Yes, with the exception of iontophoresis and hot packs/cold packs  TELEHEALTH COVERED: Yes   Visit # 2, 2/10 for progress note   Visits Allowed:    Recertification Date:    Physician Follow-Up: 10/12/22   Physician Orders: Follow protocol for massive RCR, BT, no resistive elbow flexion x 6 weeks   Pertinent History:  Patient reports that his shoulder gave out in 2022. Patient underwent surgery on 22 for arthroscopic RCR, BT, SAD, DCR, ED.      has a past medical history of Chondromalacia of right knee, Hypertension, and Legionnaire's disease ([address] SUBJECTIVE: Patient reports that he is doing fairly well.  States exercises have been going well        TREATMENT   Precautions: Goes by Ricky Roldan, surgery date 22, follow protocol for massive RCR, BT, no resistive elbow flexion x 6 weeks   Pain:  3/10 in the left shoulder    X in shaded column indicates Activity Completed Today   Modalities Parameters/  Location  Notes/Comments                     Manual Therapy Time/  Technique  Notes/Comments   Supine PROM left shoulder for flexion and ER  xx    STM to left upper trap, bicep, and deltoid  xx          Exercises   Sets/  Sec Reps  Notes/Comments   AAROM elbow flexion/extension; AROM forearm to hand 1 10 xx    Scapular pinches, depression 1 10 xx    Side to side pendulums and circles 1 10 ea xx    Supine ER with dowel within 40 degree limitation 1 10 xx                         Activities Time    Notes/Comments   Education for passive dressing and bathing                    Specific Interventions Next Treatment: follow protocol for massive RCR, BT, no resisitve elbow flexion x 6 weeks    Activity/Treatment Tolerance:  [x]  Patient tolerated treatment well  []  Patient limited by fatigue  []  Patient limited by pain   []  Patient limited by other medical complications  []  Other:     Assessment: Patient presents to clinic for first full treatment session after eval. Pt. Tolerated PROM well. Demo'd pendulums with good technique. States he is only taking tylenol for pain - afraid to take the prescription pain med  Areas for Improvement: impaired ROM, impaired strength, and pain  Prognosis: good    GOALS:  Patient Goal: return to previous functional level, return to part time jobs     Short Term Goals:  Time Frame: 4 weeks   Patient will increase left shoulder PROM to 140 degrees flexion and 50 degrees ER (when allowed by protocol) to increase ease and donning and doffing shirt. Patient will report pain with activity no greater than 4/10 to increase ease with bathing routine. Patient will be independent with HEP to increase ability for eventual return to work.       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 Patient will reach into upper cupboard to gather dishes for eating without difficulty. Patient Education:   []  HEP/Education Completed: Plan of Care, Goals  HEP: scapular pinches and depression, side to side pendulums, distal AROM  []  No new Education completed  [x]  Reviewed Prior HEP      [x]  Patient verbalized and/or demonstrated understanding of education provided. []  Patient unable to verbalize and/or demonstrate understanding of education provided. Will continue education. [x]  Barriers to learning: none    PLAN:  Treatment Recommendations: Strengthening, Range of Motion, Manual Therapy - Soft Tissue Mobilization, Manual Therapy - Joint Manipulation, Home Exercise Program, Patient Education, Integrative Dry Needling, and Modalities    []  Plan of care initiated. Plan to see patient 2 times per week for 12 weeks to address the treatment planned outlined above.   [x]  Continue with current plan of care  []  Modify plan of care as follows:    []  Hold pending physician visit  []  Discharge    Time In 0725   Time Out 0755   Timed Code Minutes: 30 min   Total Treatment Time: 30 min       Electronically Signed by: DANICA Carrero/ANTON [id number]

## 2022-10-10 ENCOUNTER — HOSPITAL ENCOUNTER (OUTPATIENT)
Dept: OCCUPATIONAL THERAPY | Age: 72
Setting detail: THERAPIES SERIES
Discharge: HOME | End: 2022-10-10
Payer: MEDICARE

## 2022-10-10 PROCEDURE — 97110 THERAPEUTIC EXERCISES: CPT

## 2022-10-10 NOTE — PROGRESS NOTES
[address]  [] EVALUATION  [x] DAILY NOTE (LAND) [] DAILY NOTE (AQUATIC ) [] PROGRESS NOTE [] DISCHARGE NOTE    [] [address] [facility]   [] [address]    [x] [facility]   [] Nick Brannon    Date: 10/10/2022  Patient Name:  Mihaela Godoy  : 1950  MRN: [id number]  CSN: [id number]    Referring Practitioner Anastacio Patel MD   Diagnosis Complete rotator cuff tear or rupture of left shoulder, not specified as traumatic [M75.122]  Superior glenoid labrum lesion of left shoulder, initial encounter [B63.584T]    Treatment Diagnosis Decreased ROM L UE, increased pain L UE, decreased ADLs   Date of Evaluation 10/3/22      Functional Outcome Measure Used UEFS   Functional Outcome Score  (10/3/22)       Insurance: Primary: Payor: MEDICARE /  /  / ,   Secondary: [facility]   Authorization Information: PRECERTIFICATION REQUIRED:  No  INSURANCE THERAPY BENEFIT:  Patient has unlimited visits based on medical necessity  Benefit will not cover maintenance or preventative treatment. AQUATIC THERAPY COVERED:   Yes  MODALITIES COVERED:  Yes, with the exception of iontophoresis and hot packs/cold packs  TELEHEALTH COVERED: Yes   Visit # 3, 3/10 for progress note   Visits Allowed: unlimited   Recertification Date:    Physician Follow-Up: 10/12/22   Physician Orders: Follow protocol for massive RCR, BT, no resistive elbow flexion x 6 weeks   Pertinent History:  Patient reports that his shoulder gave out in 2022. Patient underwent surgery on 22 for arthroscopic RCR, BT, SAD, DCR, ED.      has a past medical history of Chondromalacia of right knee, Hypertension, and Legionnaire's disease ([facility] [address] SUBJECTIVE: Patient states that he thinks that his shoulder is doing \"real well. \" States that he keeps the thumb strap on the sling so that he doesn't reach forward with his left arm.        TREATMENT   Precautions: Goes by Cris Lindsey, surgery date 9/28/22, follow protocol for massive RCR, BT, no resistive elbow flexion x 6 weeks   Pain:  3/10 LOCATION: lateral left shoulder    X in shaded column indicates Activity Completed Today   Modalities Parameters/  Location  Notes/Comments                     Manual Therapy Time/  Technique  Notes/Comments         STM to left upper trap, bicep, and deltoid            Exercises   Sets/  Sec Reps  Notes/Comments   AAROM elbow flexion/extension; AROM forearm to hand 1 10     Scapular retraction 1 10 x    Pendulums with left UE - all 3 directions 1 10 ea x    Supine ER with dowel within 40 degree limitation 1 10     Supine PROM to left shoulder for flexion and ER at side per physician protocol   x    Backward shoulder circles 1 10 x           Activities Time    Notes/Comments   Education for passive dressing and bathing      Adjusted sling for increased comfort  x            Specific Interventions Next Treatment: follow protocol for massive RCR, BT, no resisitve elbow flexion x 6 weeks    Activity/Treatment Tolerance:  [x]  Patient tolerated treatment well  []  Patient limited by fatigue  []  Patient limited by pain   []  Patient limited by other medical complications  []  Other:     Assessment: Patient is progressing toward goals nicely. GOALS:  Patient Goal: return to previous functional level, return to part time jobs     Short Term Goals:  Time Frame: 4 weeks   Patient will increase left shoulder PROM to 140 degrees flexion and 50 degrees ER (when allowed by protocol) to increase ease and donning and doffing shirt. Patient will report pain with activity no greater than 4/10 to increase ease with bathing routine. Patient will be independent with HEP to increase ability for eventual return to work. 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   Patient will reach into upper cupboard to gather dishes for eating without difficulty. Patient Education:   [x]  HEP/Education Completed: circular and forward pendulums, backward shoulder circles    []  No new Education completed  []  Reviewed Prior HEP      [x]  Patient verbalized and/or demonstrated understanding of education provided. []  Patient unable to verbalize and/or demonstrate understanding of education provided. Will continue education. [x]  Barriers to learning: none    PLAN:      []  Plan of care initiated. Plan to see patient 2 times per week for 12 weeks to address the treatment planned outlined above.   [x]  Continue with current plan of care  []  Modify plan of care as follows:    []  Hold pending physician visit  []  Discharge    Time In 0930   Time Out 1000   Timed Code Minutes: 30 min   Total Treatment Time: 30 min       Barrett Yen OTR/L #[id number]

## 2022-10-13 ENCOUNTER — HOSPITAL ENCOUNTER (OUTPATIENT)
Dept: OCCUPATIONAL THERAPY | Age: 72
Setting detail: THERAPIES SERIES
Discharge: HOME | End: 2022-10-13
Payer: MEDICARE

## 2022-10-13 PROCEDURE — 97110 THERAPEUTIC EXERCISES: CPT

## 2022-10-13 NOTE — PROGRESS NOTES
[address]  [] EVALUATION  [x] DAILY NOTE (LAND) [] DAILY NOTE (AQUATIC ) [] PROGRESS NOTE [] DISCHARGE NOTE    [] [facility]   [] [address]    [x] [facility]   [] Ameena Apo    Date: 10/13/2022  Patient Name:  Missy Yung  : 1950  MRN: [id number]  CSN: [id number]    Referring Practitioner Merary Parish MD   Diagnosis Complete rotator cuff tear or rupture of left shoulder, not specified as traumatic [M75.122]  Superior glenoid labrum lesion of left shoulder, initial encounter [P55.155I]    Treatment Diagnosis Decreased ROM L UE, increased pain L UE, decreased ADLs   Date of Evaluation 10/3/22      Functional Outcome Measure Used UEFS   Functional Outcome Score  (10/3/22)       Insurance: Primary: Payor: MEDICARE /  /  / ,   Secondary: [facility]   Authorization Information: PRECERTIFICATION REQUIRED:  No  INSURANCE THERAPY BENEFIT:  Patient has unlimited visits based on medical necessity  Benefit will not cover maintenance or preventative treatment. AQUATIC THERAPY COVERED:   Yes  MODALITIES COVERED:  Yes, with the exception of iontophoresis and hot packs/cold packs  TELEHEALTH COVERED: Yes   Visit # 4, 4/10 for progress note   Visits Allowed: unlimited   Recertification Date:    Physician Follow-Up: 22   Physician Orders: Follow protocol for massive RCR, BT, no resistive elbow flexion x 6 weeks   Pertinent History:  Patient reports that his shoulder gave out in 2022. Patient underwent surgery on 22 for arthroscopic RCR, BT, SAD, DCR, ED.      has a past medical history of Chondromalacia of right knee, Hypertension, and Legionnaire's disease ([facility] [address] SUBJECTIVE: Patient states that his pain does not go higher than 2/10 in left shoulder. States that this is not a constant pain.  States that he saw CNP yesterday and was told that he didn't need to use the pillow any longer with the sling. States that the sutures were removed. States that he is able to take the sling off in the evening as long as he is resting his arm on a pillow or armrest. Was told that he is not able to lift anything more than a pop can. TREATMENT   Precautions: Goes by Jenna Cardenas, surgery date 9/28/22, follow protocol for massive RCR, BT, no resistive elbow flexion x 6 weeks   Pain:  No pain at time of therapy LOCATION: lateral left shoulder    X in shaded column indicates Activity Completed Today   Modalities Parameters/  Location  Notes/Comments                     Manual Therapy Time/  Technique  Notes/Comments         STM to left upper trap, bicep, and deltoid            Exercises   Sets/  Sec Reps  Notes/Comments   AAROM elbow flexion/extension; AROM forearm to hand 1 10     Scapular retraction 1 15 x    Pendulums with left UE - all 3 directions 1 10 ea x    Supine ER with dowel within 40 degree limitation 1 15 x    Supine PROM to left shoulder for flexion and ER at side per physician protocol   x    Backward shoulder circles 1 15 x           Activities Time    Notes/Comments   Education for passive dressing and bathing      Adjusted sling for increased comfort              Specific Interventions Next Treatment: follow protocol for massive RCR, BT, no resisitve elbow flexion x 6 weeks    Activity/Treatment Tolerance:  [x]  Patient tolerated treatment well  []  Patient limited by fatigue  []  Patient limited by pain   []  Patient limited by other medical complications  []  Other:     Assessment: Patient is progressing toward goals nicely. GOALS:  Patient Goal: return to previous functional level, return to part time jobs     Short Term Goals:  Time Frame: 4 weeks   Patient will increase left shoulder PROM to 140 degrees flexion and 50 degrees ER (when allowed by protocol) to increase ease and donning and doffing shirt.   Patient will report pain with activity no greater than 4/10 to increase ease with bathing routine. Patient will be independent with HEP to increase ability for eventual return to work. 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 Patient will reach into upper cupboard to gather dishes for eating without difficulty. Patient Education:   []  HEP/Education Completed:     []  No new Education completed  [x]  Reviewed Prior HEP      [x]  Patient verbalized and/or demonstrated understanding of education provided. []  Patient unable to verbalize and/or demonstrate understanding of education provided. Will continue education. [x]  Barriers to learning: none    PLAN:      []  Plan of care initiated. Plan to see patient 2 times per week for 12 weeks to address the treatment planned outlined above.   [x]  Continue with current plan of care  []  Modify plan of care as follows:    []  Hold pending physician visit  []  Discharge    Time In 1445   Time Out 1510   Timed Code Minutes: 25 min   Total Treatment Time: 25 min       Aura Hirsch OTR/L #[id number]

## 2022-10-17 ENCOUNTER — HOSPITAL ENCOUNTER (OUTPATIENT)
Dept: OCCUPATIONAL THERAPY | Age: 72
Setting detail: THERAPIES SERIES
Discharge: HOME | End: 2022-10-17
Payer: MEDICARE

## 2022-10-17 PROCEDURE — 97110 THERAPEUTIC EXERCISES: CPT

## 2022-10-17 NOTE — PROGRESS NOTES
[address]  [] EVALUATION  [x] DAILY NOTE (LAND) [] DAILY NOTE (AQUATIC ) [] PROGRESS NOTE [] DISCHARGE NOTE    [] [address] [facility]   [] [address]    [x] [facility]   [] Alex Show    Date: 10/17/2022  Patient Name:  Anton Cornell  : 1950  MRN: [id number]  CSN: [id number]    Referring Practitioner Francisco Zazueta MD   Diagnosis Complete rotator cuff tear or rupture of left shoulder, not specified as traumatic [M75.122]  Superior glenoid labrum lesion of left shoulder, initial encounter [N12.506V]    Treatment Diagnosis Decreased ROM L UE, increased pain L UE, decreased ADLs   Date of Evaluation 10/3/22      Functional Outcome Measure Used UEFS   Functional Outcome Score  (10/3/22)       Insurance: Primary: Payor: MEDICARE /  /  / ,   Secondary: BC   Authorization Information: PRECERTIFICATION REQUIRED:  No  INSURANCE THERAPY BENEFIT:  Patient has unlimited visits based on medical necessity  Benefit will not cover maintenance or preventative treatment. AQUATIC THERAPY COVERED:   Yes  MODALITIES COVERED:  Yes, with the exception of iontophoresis and hot packs/cold packs  TELEHEALTH COVERED: Yes   Visit # 5, 5/10 for progress note   Visits Allowed: unlimited   Recertification Date:    Physician Follow-Up: 22   Physician Orders: Follow protocol for massive RCR, BT, no resistive elbow flexion x 6 weeks   Pertinent History:  Patient reports that his shoulder gave out in 2022. Patient underwent surgery on 22 for arthroscopic RCR, BT, SAD, DCR, ED.      has a past medical history of Chondromalacia of right knee, Hypertension, and Legionnaire's disease ([facility] [address] SUBJECTIVE: Patient reports that his shoulder is feeling good and is catching himself wanting to use the arm.       TREATMENT   Precautions: Goes by [facility], surgery date 22, follow protocol for massive RCR, BT, no resistive elbow flexion x 6 weeks   Pain:  No pain at time of therapy LOCATION: lateral left shoulder    X in shaded column indicates Activity Completed Today   Modalities Parameters/  Location  Notes/Comments                     Manual Therapy Time/  Technique  Notes/Comments         STM to left upper trap, bicep, and deltoid            Exercises   Sets/  Sec Reps  Notes/Comments   AAROM elbow flexion/extension; AROM forearm to hand 1 10     Scapular retraction 1 15 x    Pendulums with left UE - all 3 directions 1 10 ea x    Supine ER with dowel within 40 degree limitation 1 15     Supine PROM to left shoulder for flexion and ER at side per physician protocol   x    Backward shoulder circles 1 15 x           Activities Time    Notes/Comments   Education for passive dressing and bathing      Adjusted sling for increased comfort              Specific Interventions Next Treatment: follow protocol for massive RCR, BT, no resisitve elbow flexion x 6 weeks    Activity/Treatment Tolerance:  [x]  Patient tolerated treatment well  []  Patient limited by fatigue  []  Patient limited by pain   []  Patient limited by other medical complications  []  Other:     Assessment: Patient is progressing toward goals nicely. GOALS:  Patient Goal: return to previous functional level, return to part time jobs     Short Term Goals:  Time Frame: 4 weeks   Patient will increase left shoulder PROM to 140 degrees flexion and 50 degrees ER (when allowed by protocol) to increase ease and donning and doffing shirt. Patient will report pain with activity no greater than 4/10 to increase ease with bathing routine. Patient will be independent with HEP to increase ability for eventual return to work. 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 Patient will reach into upper cupboard to gather dishes for eating without difficulty.       Patient Education:   []  HEP/Education Completed:     []  No new Education completed  [x]  Reviewed Prior HEP      [x]  Patient verbalized and/or demonstrated understanding of education provided. []  Patient unable to verbalize and/or demonstrate understanding of education provided. Will continue education. [x]  Barriers to learning: none    PLAN:      []  Plan of care initiated. Plan to see patient 2 times per week for 12 weeks to address the treatment planned outlined above.   [x]  Continue with current plan of care  []  Modify plan of care as follows:    []  Hold pending physician visit  []  Discharge    Time In 1615   Time Out 1640   Timed Code Minutes: 25 min   Total Treatment Time: 25 min       Pooja Yarbrough, [facility], OTR/L [id number]

## 2022-10-19 ENCOUNTER — HOSPITAL ENCOUNTER (OUTPATIENT)
Dept: OCCUPATIONAL THERAPY | Age: 72
Setting detail: THERAPIES SERIES
Discharge: HOME | End: 2022-10-19
Payer: MEDICARE

## 2022-10-19 PROCEDURE — 97110 THERAPEUTIC EXERCISES: CPT

## 2022-10-19 NOTE — PROGRESS NOTES
[address]  [] EVALUATION  [x] DAILY NOTE (LAND) [] DAILY NOTE (AQUATIC ) [] PROGRESS NOTE [] DISCHARGE NOTE    [] [address] [facility]   [] [address]    [x] [facility]   [] Ad Batch    Date: 10/19/2022  Patient Name:  Curry Doherty  : 1950  MRN: [id number]  CSN: [id number]    Referring Practitioner Oz Corona MD   Diagnosis Complete rotator cuff tear or rupture of left shoulder, not specified as traumatic [M75.122]  Superior glenoid labrum lesion of left shoulder, initial encounter [N64.450W]    Treatment Diagnosis Decreased ROM L UE, increased pain L UE, decreased ADLs   Date of Evaluation 10/3/22      Functional Outcome Measure Used UEFS   Functional Outcome Score  (10/3/22)       Insurance: Primary: Payor: MEDICARE /  /  / ,   Secondary: [facility]   Authorization Information: PRECERTIFICATION REQUIRED:  No  INSURANCE THERAPY BENEFIT:  Patient has unlimited visits based on medical necessity  Benefit will not cover maintenance or preventative treatment. AQUATIC THERAPY COVERED:   Yes  MODALITIES COVERED:  Yes, with the exception of iontophoresis and hot packs/cold packs  TELEHEALTH COVERED: Yes   Visit # 6, 6/10 for progress note   Visits Allowed: unlimited   Recertification Date:    Physician Follow-Up: 22   Physician Orders: Follow protocol for massive RCR, BT, no resistive elbow flexion x 6 weeks   Pertinent History:  Patient reports that his shoulder gave out in 2022. Patient underwent surgery on 22 for arthroscopic RCR, BT, SAD, DCR, ED.      has a past medical history of Chondromalacia of right knee, Hypertension, and Legionnaire's disease ([facility] [address] SUBJECTIVE: Reports that he has been having some pain on right side when he takes a deep breath. States that this pain started this morning (reports 2/10 pain with inspiration).    States that he does have some stinging pain in left shoulder once in a while. TREATMENT   Precautions: Goes by Samira Gomez, surgery date 9/28/22, follow protocol for massive RCR, BT, no resistive elbow flexion x 6 weeks   Pain:  1/10 LOCATION: lateral left shoulder    X in shaded column indicates Activity Completed Today   Modalities Parameters/  Location  Notes/Comments                     Manual Therapy Time/  Technique  Notes/Comments         STM to left upper trap, bicep, and deltoid  x          Exercises   Sets/  Sec Reps  Notes/Comments   AAROM elbow flexion/extension; AROM forearm to hand 1 10     Scapular retraction 1 15 x    Pendulums with left UE - all 3 directions 1 10 ea x    Supine ER with dowel within 40 degree limitation 1 15     Supine PROM to left shoulder for flexion and ER at side per physician protocol   x Nice PROM present this date   Backward shoulder circles 1 15 x           Activities Time    Notes/Comments   Education for passive dressing and bathing      Adjusted sling for increased comfort              Specific Interventions Next Treatment: follow protocol for massive RCR, BT, no resisitve elbow flexion x 6 weeks    Activity/Treatment Tolerance:  [x]  Patient tolerated treatment well  []  Patient limited by fatigue  []  Patient limited by pain   []  Patient limited by other medical complications  []  Other:     Assessment: Patient is progressing toward goals appropriately. GOALS:  Patient Goal: return to previous functional level, return to part time jobs     Short Term Goals:  Time Frame: 4 weeks   Patient will increase left shoulder PROM to 140 degrees flexion and 50 degrees ER (when allowed by protocol) to increase ease and donning and doffing shirt. Patient will report pain with activity no greater than 4/10 to increase ease with bathing routine. Patient will be independent with HEP to increase ability for eventual return to work.       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 Patient will reach into upper cupboard to gather dishes for eating without difficulty. Patient Education:   []  HEP/Education Completed:     []  No new Education completed  [x]  Reviewed Prior HEP      [x]  Patient verbalized and/or demonstrated understanding of education provided. []  Patient unable to verbalize and/or demonstrate understanding of education provided. Will continue education. [x]  Barriers to learning: none    PLAN:      []  Plan of care initiated. Plan to see patient 2 times per week for 12 weeks to address the treatment planned outlined above.   [x]  Continue with current plan of care  []  Modify plan of care as follows:    []  Hold pending physician visit  []  Discharge    Time In 1300   Time Out 1330   Timed Code Minutes: 30 min   Total Treatment Time: 30 min       Jackson Herrera OTR/L #[id number]

## 2022-10-24 ENCOUNTER — HOSPITAL ENCOUNTER (OUTPATIENT)
Dept: OCCUPATIONAL THERAPY | Age: 72
Setting detail: THERAPIES SERIES
Discharge: HOME | End: 2022-10-24
Payer: MEDICARE

## 2022-10-24 PROCEDURE — 97110 THERAPEUTIC EXERCISES: CPT

## 2022-10-24 NOTE — PROGRESS NOTES
[address]  [] EVALUATION  [x] DAILY NOTE (LAND) [] DAILY NOTE (AQUATIC ) [] PROGRESS NOTE [] DISCHARGE NOTE    [] [address] [facility]   [] [address]    [x] [facility]   [] Anabell Montgomery    Date: 10/24/2022  Patient Name:  Ellie Grey  : 1950  MRN: [id number]  CSN: [id number]    Referring Practitioner Reji Stearns MD   Diagnosis Complete rotator cuff tear or rupture of left shoulder, not specified as traumatic [M75.122]  Superior glenoid labrum lesion of left shoulder, initial encounter [A47.906X]    Treatment Diagnosis Decreased ROM L UE, increased pain L UE, decreased ADLs   Date of Evaluation 10/3/22      Functional Outcome Measure Used UEFS   Functional Outcome Score  (10/3/22)       Insurance: Primary: Payor: MEDICARE /  /  / ,   Secondary: [facility]   Authorization Information: PRECERTIFICATION REQUIRED:  No  INSURANCE THERAPY BENEFIT:  Patient has unlimited visits based on medical necessity  Benefit will not cover maintenance or preventative treatment. AQUATIC THERAPY COVERED:   Yes  MODALITIES COVERED:  Yes, with the exception of iontophoresis and hot packs/cold packs  TELEHEALTH COVERED: Yes   Visit # 7, 7/10 for progress note   Visits Allowed: unlimited   Recertification Date:    Physician Follow-Up: 22   Physician Orders: Follow protocol for massive RCR, BT, no resistive elbow flexion x 6 weeks   Pertinent History:  Patient reports that his shoulder gave out in 2022. Patient underwent surgery on 22 for arthroscopic RCR, BT, SAD, DCR, ED.      has a past medical history of Chondromalacia of right knee, Hypertension, and Legionnaire's disease ([facility] [address] SUBJECTIVE: Reports that he has not been having any pain for the past few days.       TREATMENT   Precautions: Goes by Pravin Thomas, surgery date 22, follow protocol for massive RCR, BT, no resistive elbow flexion x 6 weeks   Pain:  0/10    X in shaded column indicates Activity Completed Today   Modalities Parameters/  Location  Notes/Comments                     Manual Therapy Time/  Technique  Notes/Comments         STM to left upper trap, bicep, and deltoid            Exercises   Sets/  Sec Reps  Notes/Comments   AAROM elbow flexion/extension; AROM forearm to hand 1 10     Scapular retraction 1 15 x    Pendulums with left UE - all 3 directions 1 10 ea x    Supine ER with dowel within 40 degree limitation 1 15     Supine PROM to left shoulder for flexion and ER at side per physician protocol   x Nice PROM present this date, no tightness with ER   Backward shoulder circles 1 15 x           Activities Time    Notes/Comments   Education for passive dressing and bathing      Adjusted sling for increased comfort              Specific Interventions Next Treatment: follow protocol for massive RCR, BT, no resisitve elbow flexion x 6 weeks    Activity/Treatment Tolerance:  [x]  Patient tolerated treatment well  []  Patient limited by fatigue  []  Patient limited by pain   []  Patient limited by other medical complications  []  Other:     Assessment: Patient is progressing toward goals appropriately. GOALS:  Patient Goal: return to previous functional level, return to part time jobs     Short Term Goals:  Time Frame: 4 weeks   Patient will increase left shoulder PROM to 140 degrees flexion and 50 degrees ER (when allowed by protocol) to increase ease and donning and doffing shirt. Patient will report pain with activity no greater than 4/10 to increase ease with bathing routine. Patient will be independent with HEP to increase ability for eventual return to work. 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 Patient will reach into upper cupboard to gather dishes for eating without difficulty.       Patient Education:   []  HEP/Education Completed:     []  No new Education completed  [x]  Reviewed Prior HEP      [x]  Patient verbalized and/or demonstrated understanding of education provided. []  Patient unable to verbalize and/or demonstrate understanding of education provided. Will continue education. [x]  Barriers to learning: none    PLAN:      []  Plan of care initiated. Plan to see patient 2 times per week for 12 weeks to address the treatment planned outlined above.   [x]  Continue with current plan of care  []  Modify plan of care as follows:    []  Hold pending physician visit  []  Discharge    Time In 1408   Time Out 1432   Timed Code Minutes: 24 min   Total Treatment Time: 24 min       Nino Rodriguez, OTR/L [id number]

## 2022-10-27 ENCOUNTER — HOSPITAL ENCOUNTER (OUTPATIENT)
Dept: OCCUPATIONAL THERAPY | Age: 72
Setting detail: THERAPIES SERIES
Discharge: HOME | End: 2022-10-27
Payer: MEDICARE

## 2022-10-27 PROCEDURE — 97110 THERAPEUTIC EXERCISES: CPT

## 2022-10-27 NOTE — PROGRESS NOTES
[address]  [] EVALUATION  [x] DAILY NOTE (LAND) [] DAILY NOTE (AQUATIC ) [] PROGRESS NOTE [] DISCHARGE NOTE    [] [address] [facility]   [] [address]    [x] [facility]   [] José Miguel Duefercho    Date: 10/27/2022  Patient Name:  Sera Benavidez  : 1950  MRN: [id number]  CSN: [id number]    Referring Practitioner Tyrel White MD   Diagnosis Complete rotator cuff tear or rupture of left shoulder, not specified as traumatic [M75.122]  Superior glenoid labrum lesion of left shoulder, initial encounter [G53.990M]    Treatment Diagnosis Decreased ROM L UE, increased pain L UE, decreased ADLs   Date of Evaluation 10/3/22      Functional Outcome Measure Used UEFS   Functional Outcome Score  (10/3/22)       Insurance: Primary: Payor: MEDICARE /  /  / ,   Secondary: [facility]   Authorization Information: PRECERTIFICATION REQUIRED:  No  INSURANCE THERAPY BENEFIT:  Patient has unlimited visits based on medical necessity  Benefit will not cover maintenance or preventative treatment. AQUATIC THERAPY COVERED:   Yes  MODALITIES COVERED:  Yes, with the exception of iontophoresis and hot packs/cold packs  TELEHEALTH COVERED: Yes   Visit # 8, 810 for progress note   Visits Allowed: unlimited   Recertification Date:    Physician Follow-Up: 11/10/22   Physician Orders: Follow protocol for massive RCR, BT, no resistive elbow flexion x 6 weeks   Pertinent History:  Patient reports that his shoulder gave out in 2022. Patient underwent surgery on 22 for arthroscopic RCR, BT, SAD, DCR, ED.      has a past medical history of Chondromalacia of right knee, Hypertension, and Legionnaire's disease ([facility] [address] SUBJECTIVE: Patient states that he is doing well. States that he does have some pain in his left shoulder when he dons a jacket.   Patient states that he does have some shooting pains in his left shoulder occasionally. TREATMENT   Precautions: Goes by Cassandra Santo, surgery date 9/28/22, follow protocol for massive RCR, BT, no resistive elbow flexion x 6 weeks   Pain:  0/10    X in shaded column indicates Activity Completed Today   Modalities Parameters/  Location  Notes/Comments                     Manual Therapy Time/  Technique  Notes/Comments   STM to left upper trapezius region  x    STM to left upper trap, bicep, and deltoid            Exercises   Sets/  Sec Reps  Notes/Comments   AAROM elbow flexion/extension; AROM forearm to hand 1 10     Scapular retraction 1 15 x    Pendulums with left UE - all 3 directions 1 10 ea x    Supine ER with dowel within 40 degree limitation 1 15     Supine PROM to left shoulder for flexion and ER at side per physician protocol   x    Backward shoulder circles 1 15 x    Bilateral table slides for shoulder flexion 1 15 x 5 second hold   Activities Time    Notes/Comments   Education for passive dressing and bathing      Adjusted sling for increased comfort              Specific Interventions Next Treatment: follow protocol for massive RCR, BT, no resisitve elbow flexion x 6 weeks    Activity/Treatment Tolerance:  [x]  Patient tolerated treatment well  []  Patient limited by fatigue  []  Patient limited by pain   []  Patient limited by other medical complications  []  Other:     Assessment: Patient is progressing nicely toward goals. GOALS:  Patient Goal: return to previous functional level, return to part time jobs     Short Term Goals:  Time Frame: 4 weeks   Patient will increase left shoulder PROM to 140 degrees flexion and 50 degrees ER (when allowed by protocol) to increase ease and donning and doffing shirt. Patient will report pain with activity no greater than 4/10 to increase ease with bathing routine. Patient will be independent with HEP to increase ability for eventual return to work.       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 Patient will reach into upper cupboard to gather dishes for eating without difficulty. Patient Education:   [x]  HEP/Education Completed: bilateral table slides    []  No new Education completed  [x]  Reviewed Prior HEP      [x]  Patient verbalized and/or demonstrated understanding of education provided. []  Patient unable to verbalize and/or demonstrate understanding of education provided. Will continue education. [x]  Barriers to learning: none    PLAN:      []  Plan of care initiated. Plan to see patient 2 times per week for 12 weeks to address the treatment planned outlined above.   [x]  Continue with current plan of care  []  Modify plan of care as follows:    []  Hold pending physician visit  []  Discharge    Time In 1400   Time Out 1430   Timed Code Minutes: 30 min   Total Treatment Time: 30 min       Xiomara Bailon OTR/L #[id number]

## 2022-11-01 ENCOUNTER — HOSPITAL ENCOUNTER (OUTPATIENT)
Dept: OCCUPATIONAL THERAPY | Age: 72
Setting detail: THERAPIES SERIES
Discharge: HOME | End: 2022-11-01
Payer: MEDICARE

## 2022-11-01 PROCEDURE — 97110 THERAPEUTIC EXERCISES: CPT

## 2022-11-01 NOTE — PROGRESS NOTES
[address]  [] EVALUATION  [x] DAILY NOTE (LAND) [] DAILY NOTE (AQUATIC ) [] PROGRESS NOTE [] DISCHARGE NOTE    [] 615 [facility]   [x] [address]    [] [facility]   [] Lawrence uZñiga    Date: 2022  Patient Name:  Mariola Richards  : 1950  MRN: [id number]  CSN: [id number]    Referring Practitioner Lizz Vincent MD   Diagnosis Complete rotator cuff tear or rupture of left shoulder, not specified as traumatic [M75.122]  Superior glenoid labrum lesion of left shoulder, initial encounter [W79.079H]    Treatment Diagnosis Decreased ROM L UE, increased pain L UE, decreased ADLs   Date of Evaluation 10/3/22      Functional Outcome Measure Used UEFS   Functional Outcome Score  (10/3/22)       Insurance: Primary: Payor: MEDICARE /  /  / ,   Secondary: [facility]   Authorization Information: PRECERTIFICATION REQUIRED:  No  INSURANCE THERAPY BENEFIT:  Patient has unlimited visits based on medical necessity  Benefit will not cover maintenance or preventative treatment. AQUATIC THERAPY COVERED:   Yes  MODALITIES COVERED:  Yes, with the exception of iontophoresis and hot packs/cold packs  TELEHEALTH COVERED: Yes   Visit # 9, 9/10 for progress note   Visits Allowed: unlimited   Recertification Date:    Physician Follow-Up: 11/10/22   Physician Orders: Follow protocol for massive RCR, BT, no resistive elbow flexion x 6 weeks   Pertinent History:  Patient reports that his shoulder gave out in 2022. Patient underwent surgery on 22 for arthroscopic RCR, BT, SAD, DCR, ED.      has a past medical history of Chondromalacia of right knee, Hypertension, and Legionnaire's disease ([facility] [address] SUBJECTIVE: Patient states that he is continuing to do well. States that he is still watching what he is doing and it has been difficult to always follow those precautions.     TREATMENT   Precautions: Goes by Viridiana Mathew, surgery date 9/28/22, follow protocol for massive RCR, BT, no resistive elbow flexion x 6 weeks   Pain:  0/10    X in shaded column indicates Activity Completed Today   Modalities Parameters/  Location  Notes/Comments                     Manual Therapy Time/  Technique  Notes/Comments   STM to left upper trapezius region  x    STM to left upper trap, bicep, and deltoid            Exercises   Sets/  Sec Reps  Notes/Comments   AAROM elbow flexion/extension; AROM forearm to hand 1 10     Scapular retraction 1 15     Pendulums with left UE - all 3 directions 1 10 ea x    Supine ER with dowel within 40 degree limitation 1 15     Supine PROM to left shoulder for flexion and ER at side per physician protocol   x    Backward shoulder circles 1 15     Bilateral table slides for shoulder flexion 1 15 x 5 second hold   Activities Time    Notes/Comments   Education for passive dressing and bathing      Adjusted sling for increased comfort              Specific Interventions Next Treatment: follow protocol for massive RCR, BT, no resisitve elbow flexion x 6 weeks    Activity/Treatment Tolerance:  [x]  Patient tolerated treatment well  []  Patient limited by fatigue  []  Patient limited by pain   []  Patient limited by other medical complications  []  Other:     Assessment: Patient is progressing toward goals nicely. GOALS:  Patient Goal: return to previous functional level, return to part time jobs     Short Term Goals:  Time Frame: 4 weeks   Patient will increase left shoulder PROM to 140 degrees flexion and 50 degrees ER (when allowed by protocol) to increase ease and donning and doffing shirt. Patient will report pain with activity no greater than 4/10 to increase ease with bathing routine. Patient will be independent with HEP to increase ability for eventual return to work. 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   Patient will reach into upper cupboard to gather dishes for eating without difficulty. Patient Education:   []  HEP/Education Completed:     []  No new Education completed  [x]  Reviewed Prior HEP      [x]  Patient verbalized and/or demonstrated understanding of education provided. []  Patient unable to verbalize and/or demonstrate understanding of education provided. Will continue education. [x]  Barriers to learning: none    PLAN:      []  Plan of care initiated. Plan to see patient 2 times per week for 12 weeks to address the treatment planned outlined above.   [x]  Continue with current plan of care  []  Modify plan of care as follows:    []  Hold pending physician visit  []  Discharge    Time In 1500   Time Out 1523   Timed Code Minutes: 23 min   Total Treatment Time: 23 min       Michael Colon OTR/L #[id number]

## 2022-11-03 ENCOUNTER — HOSPITAL ENCOUNTER (OUTPATIENT)
Dept: OCCUPATIONAL THERAPY | Age: 72
Setting detail: THERAPIES SERIES
Discharge: HOME | End: 2022-11-03
Payer: MEDICARE

## 2022-11-03 PROCEDURE — 97110 THERAPEUTIC EXERCISES: CPT

## 2022-11-03 NOTE — PROGRESS NOTES
[address]  [] EVALUATION  [] DAILY NOTE (LAND) [] DAILY NOTE (AQUATIC ) [x] PROGRESS NOTE [] DISCHARGE NOTE    [] [facility]   [x] [facility]    [] [facility]   [] [employer]    Date: 11/3/2022  Patient Name:  Jorge King  : 1950  MRN: [id number]  CSN: [id number]    Referring Practitioner Ezequiel Merino MD   Diagnosis Complete rotator cuff tear or rupture of left shoulder, not specified as traumatic [M75.122]  Superior glenoid labrum lesion of left shoulder, initial encounter [Z16.609P]    Treatment Diagnosis Decreased ROM L UE, increased pain L UE, decreased ADLs   Date of Evaluation 10/3/22      Functional Outcome Measure Used UEFS   Functional Outcome Score  (10/3/22)       Insurance: Primary: Payor: MEDICARE /  /  / ,   Secondary: BC   Authorization Information: PRECERTIFICATION REQUIRED:  No  INSURANCE THERAPY BENEFIT:  Patient has unlimited visits based on medical necessity  Benefit will not cover maintenance or preventative treatment. AQUATIC THERAPY COVERED:   Yes  MODALITIES COVERED:  Yes, with the exception of iontophoresis and hot packs/cold packs  TELEHEALTH COVERED: Yes   Visit # 10, PN completed 11/3/22   Visits Allowed: unlimited   Recertification Date:    Physician Follow-Up: 11/10/22   Physician Orders: Follow protocol for massive RCR, BT, no resistive elbow flexion x 6 weeks   Pertinent History:  Patient reports that his shoulder gave out in 2022. Patient underwent surgery on 22 for arthroscopic RCR, BT, SAD, DCR, ED.      has a past medical history of Chondromalacia of right knee, Hypertension, and Legionnaire's disease ([facility] [address] SUBJECTIVE: Patient states that he feels as though he is doing well.      TREATMENT   Precautions: Goes by [facility] [place], surgery date 22, follow protocol for massive RCR, BT, no resistive elbow flexion x 6 weeks   Pain: 1/10 LOCATION: left upper arm along bicep    X in shaded column indicates Activity Completed Today   Modalities Parameters/  Location  Notes/Comments                     Manual Therapy Time/  Technique  Notes/Comments   STM to left upper trapezius region      STM to left upper trap, bicep, and deltoid            Exercises   Sets/  Sec Reps  Notes/Comments   AAROM elbow flexion/extension; AROM forearm to hand 1 10     Scapular retraction 1 15 x    Pendulums with left UE - all 3 directions 1 10 ea x    Supine ER with dowel within 40 degree limitation 1 15     Supine PROM to left shoulder for flexion and ER at side per physician protocol   x    Backward shoulder circles 1 15 x    Bilateral table slides for shoulder flexion 1 15 x 5 second hold   Activities Time    Notes/Comments   Education for passive dressing and bathing      Adjusted sling for increased comfort              LEFT UPPER EXTREMITY  RANGE OF MOTION    PROM COMMENTS        Shoulder Flexion 145    Shoulder External Rotation 52 at side          Specific Interventions Next Treatment: follow protocol for massive RCR, BT, no resisitve elbow flexion x 6 weeks    Activity/Treatment Tolerance:  [x]  Patient tolerated treatment well  []  Patient limited by fatigue  []  Patient limited by pain   []  Patient limited by other medical complications  []  Other:     Assessment:  Patient is making excellent progress toward goals. Patient does not demonstrate any hard end feel with PROM and patient's pain level has been low. 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     GOALS:  Patient Goal: return to previous functional level, return to part time jobs     Short Term Goals:  Time Frame: 4 weeks   Patient will increase left shoulder PROM to 140 degrees flexion and 50 degrees ER (when allowed by protocol) to increase ease and donning and doffing shirt. Patient will report pain with activity no greater than 4/10 to increase ease with bathing routine. GOAL MET - REVISED GOAL; Report no more than 1/10 in left shoulder with HEP upgrades to increase his ability to use left arm in daily activities. Patient will be independent with HEP to increase ability for eventual return to work. 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 GOAL NOT MET - CONTINUE GOAL  Patient will reach into upper cupboard to gather dishes for eating without difficulty. GOAL NOT MET - CONTINUE GOAL  3. GOAL INITIATED: Be able to use his left arm to turn steering wheel without pain in left shoulder. Patient Education:   [x]  HEP/Education Completed: goal status    []  No new Education completed  []  Reviewed Prior HEP      [x]  Patient verbalized and/or demonstrated understanding of education provided. []  Patient unable to verbalize and/or demonstrate understanding of education provided. Will continue education. [x]  Barriers to learning: none    PLAN:      []  Plan of care initiated. Plan to see patient 2 times per week for 12 weeks to address the treatment planned outlined above.   []  Continue with current plan of care  [x]  Modify plan of care as follows: see patient 2 x week x 7 weeks from 11/3/22   []  Hold pending physician visit  []  Discharge    Time In 0800   Time Out 0830   Timed Code Minutes: 30 min   Total Treatment Time: 30 min       Maryann De Guzman OTR/ANTON #[id number]

## 2022-11-07 ENCOUNTER — HOSPITAL ENCOUNTER (OUTPATIENT)
Dept: OCCUPATIONAL THERAPY | Age: 72
Setting detail: THERAPIES SERIES
Discharge: HOME | End: 2022-11-07
Payer: MEDICARE

## 2022-11-07 PROCEDURE — 97110 THERAPEUTIC EXERCISES: CPT

## 2022-11-07 NOTE — PROGRESS NOTES
[address]  [] EVALUATION  [x] DAILY NOTE (LAND) [] DAILY NOTE (AQUATIC ) [] PROGRESS NOTE [] DISCHARGE NOTE    [] [facility]   [x] [address]    [] [facility]   [] Jackie Bell    Date: 2022  Patient Name:  Alina Zaidi  : 1950  MRN: [id number]  CSN: [id number]    Referring Practitioner Yen Maki MD   Diagnosis Complete rotator cuff tear or rupture of left shoulder, not specified as traumatic [M75.122]  Superior glenoid labrum lesion of left shoulder, initial encounter [G90.031C]    Treatment Diagnosis Decreased ROM L UE, increased pain L UE, decreased ADLs   Date of Evaluation 10/3/22      Functional Outcome Measure Used UEFS   Functional Outcome Score  (10/3/22)       Insurance: Primary: Payor: MEDICARE /  /  / ,   Secondary: BCBS   Authorization Information: PRECERTIFICATION REQUIRED:  No  INSURANCE THERAPY BENEFIT:  Patient has unlimited visits based on medical necessity  Benefit will not cover maintenance or preventative treatment. AQUATIC THERAPY COVERED:   Yes  MODALITIES COVERED:  Yes, with the exception of iontophoresis and hot packs/cold packs  TELEHEALTH COVERED: Yes   Visit # 11, 1/10 for PN; PN completed 11/3/22   Visits Allowed: unlimited   Recertification Date:    Physician Follow-Up: 11/10/22   Physician Orders: Follow protocol for massive RCR, BT, no resistive elbow flexion x 6 weeks   Pertinent History:  Patient reports that his shoulder gave out in 2022. Patient underwent surgery on 22 for arthroscopic RCR, BT, SAD, DCR, ED.      has a past medical history of Chondromalacia of right knee, Hypertension, and Legionnaire's disease ([facility] [address] SUBJECTIVE: Patient relates that he is only have very minimal pain in his right bicep region.      TREATMENT   Precautions: Goes by [facility], surgery date 22, follow protocol for massive RCR, BT, no Patient feels her vision is not clear after cataract surgery. Explained to patient that retina appears normal in both eyes. Patient is bothered by membranes and strands in the vitreous.  Recommend PPV in the right eye. resistive elbow flexion x 6 weeks   Pain:  1/10 LOCATION: left upper arm along bicep    X in shaded column indicates Activity Completed Today   Modalities Parameters/  Location  Notes/Comments                     Manual Therapy Time/  Technique  Notes/Comments   STM to left upper trapezius region      STM to left upper trap, bicep, and deltoid            Exercises   Sets/  Sec Reps  Notes/Comments          Scapular retraction 1 15 x    Pendulums with left UE - all 3 directions 1 10 ea x    Supine ER with dowel within 40 degree limitation 1 15 x    Supine PROM to left shoulder for flexion and ER at side per physician protocol   x    Backward shoulder circles 1 15 x    Bilateral table slides for shoulder flexion 1 15 x 5 second hold   Activities Time    Notes/Comments   Education for passive dressing and bathing      Adjusted sling for increased comfort                    Specific Interventions Next Treatment: follow protocol for massive RCR, BT, no resisitve elbow flexion x 6 weeks    Activity/Treatment Tolerance:  [x]  Patient tolerated treatment well  []  Patient limited by fatigue  []  Patient limited by pain   []  Patient limited by other medical complications  []  Other:     Assessment:  Patient is progressing toward goals nicely. GOALS:  Patient Goal: return to previous functional level, return to part time jobs     Short Term Goals:  Time Frame: 4 weeks   Patient will increase left shoulder  passive flexion to 155 and ER at side to 55 to increase flexibility to allow patient to complete daily tasks. .   Report no more than 1/10 in left shoulder with HEP upgrades to increase his ability to use left arm in daily activities. Patient will be independent with HEP to increase ability for eventual return to work. 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    Patient will reach into upper cupboard to gather dishes for eating without difficulty. 3. x Be able to use his left arm to turn steering wheel without pain in left shoulder. Patient Education:   []  HEP/Education Completed:     []  No new Education completed  [x]  Reviewed Prior HEP      [x]  Patient verbalized and/or demonstrated understanding of education provided. []  Patient unable to verbalize and/or demonstrate understanding of education provided. Will continue education. [x]  Barriers to learning: none    PLAN:      []  Plan of care initiated. Plan to see patient 2 times per week for 12 weeks to address the treatment planned outlined above.   [x]  Continue with current plan of care  []  Modify plan of care as follows: see patient 2 x week x 7 weeks from 11/3/22   []  Hold pending physician visit  []  Discharge    Time In 0830   Time Out 0855   Timed Code Minutes: 25 min   Total Treatment Time: 25 min       Ricky Anglin OTR/L #[id number]

## 2022-11-10 ENCOUNTER — HOSPITAL ENCOUNTER (OUTPATIENT)
Dept: OCCUPATIONAL THERAPY | Age: 72
Setting detail: THERAPIES SERIES
Discharge: HOME | End: 2022-11-10
Payer: MEDICARE

## 2022-11-10 PROCEDURE — 97110 THERAPEUTIC EXERCISES: CPT

## 2022-11-10 NOTE — PROGRESS NOTES
[address]  [] EVALUATION  [x] DAILY NOTE (LAND) [] DAILY NOTE (AQUATIC ) [] PROGRESS NOTE [] DISCHARGE NOTE    [] [address] [facility]   [x] [address]    [] [facility]   [] Evelio Eden    Date: 11/10/2022  Patient Name:  Arturo Shabazz  : 1950  MRN: [id number]  CSN: [id number]    Referring Practitioner Kaela Rendon MD   Diagnosis Complete rotator cuff tear or rupture of left shoulder, not specified as traumatic [M75.122]  Superior glenoid labrum lesion of left shoulder, initial encounter [N89.391M]    Treatment Diagnosis Decreased ROM L UE, increased pain L UE, decreased ADLs   Date of Evaluation 10/3/22      Functional Outcome Measure Used UEFS   Functional Outcome Score  (10/3/22)       Insurance: Primary: Payor: MEDICARE /  /  / ,   Secondary: BC   Authorization Information: PRECERTIFICATION REQUIRED:  No  INSURANCE THERAPY BENEFIT:  Patient has unlimited visits based on medical necessity  Benefit will not cover maintenance or preventative treatment. AQUATIC THERAPY COVERED:   Yes  MODALITIES COVERED:  Yes, with the exception of iontophoresis and hot packs/cold packs  TELEHEALTH COVERED: Yes   Visit # 12, 2/10 for PN; PN completed 11/3/22   Visits Allowed: unlimited   Recertification Date:    Physician Follow-Up: 22   Physician Orders: Follow protocol for massive RCR, BT, no resistive elbow flexion x 6 weeks   Pertinent History:  Patient reports that his shoulder gave out in 2022. Patient underwent surgery on 22 for arthroscopic RCR, BT, SAD, DCR, ED.      has a past medical history of Chondromalacia of right knee, Hypertension, and Legionnaire's disease ([facility] [address] SUBJECTIVE:  Patient states that he PA this morning and he was told that he was doing well, but to remember that he continues to need to be careful.  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 States that he didn't need to wear the sling any longer. TREATMENT   Precautions: Goes by [facility], surgery date 9/28/22, follow protocol for massive RCR, BT, no resistive elbow flexion x 6 weeks   Pain:  1/10 LOCATION: left upper arm along bicep    X in shaded column indicates Activity Completed Today   Modalities Parameters/  Location  Notes/Comments                     Manual Therapy Time/  Technique  Notes/Comments   STM to left upper trapezius region      STM to left upper trap, bicep, and deltoid            Exercises   Sets/  Sec Reps  Notes/Comments          Scapular retraction 1 15 x    Pendulums with left UE - all 3 directions 1 10 ea x    Supine ER with dowel within 40 degree limitation 1 15     Supine PROM to left shoulder for flexion and ER at side per physician protocol   x    Backward shoulder circles 1 15 x    Bilateral table slides for shoulder flexion 1 15 x 5 second hold   Activities Time    Notes/Comments   Education for passive dressing and bathing      Adjusted sling for increased comfort                    Specific Interventions Next Treatment: follow protocol for massive RCR, BT, no resisitve elbow flexion x 6 weeks    Activity/Treatment Tolerance:  [x]  Patient tolerated treatment well  []  Patient limited by fatigue  []  Patient limited by pain   []  Patient limited by other medical complications  []  Other:     Assessment:  Patient is progressing toward goals nicely. GOALS:  Patient Goal: return to previous functional level, return to part time jobs     Short Term Goals:  Time Frame: 4 weeks   Patient will increase left shoulder  passive flexion to 155 and ER at side to 55 to increase flexibility to allow patient to complete daily tasks. .   Report no more than 1/10 in left shoulder with HEP upgrades to increase his ability to use left arm in daily activities. Patient will be independent with HEP to increase ability for eventual return to work.        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 Patient will reach into upper cupboard to gather dishes for eating without difficulty. 3. x Be able to use his left arm to turn steering wheel without pain in left shoulder. Patient Education:   []  HEP/Education Completed:     [x]  No new Education completed  [x]  Reviewed Prior HEP      [x]  Patient verbalized and/or demonstrated understanding of education provided. []  Patient unable to verbalize and/or demonstrate understanding of education provided. Will continue education. [x]  Barriers to learning: none    PLAN:      []  Plan of care initiated. Plan to see patient 2 times per week for 12 weeks to address the treatment planned outlined above.   [x]  Continue with current plan of care  []  Modify plan of care as follows: see patient 2 x week x 7 weeks from 11/3/22   []  Hold pending physician visit  []  Discharge    Time In 1330   Time Out 1355   Timed Code Minutes: 25 min   Total Treatment Time: 25 min       Aura Hirsch OTR/L #[id number]

## 2022-11-15 ENCOUNTER — HOSPITAL ENCOUNTER (OUTPATIENT)
Dept: OCCUPATIONAL THERAPY | Age: 72
Setting detail: THERAPIES SERIES
Discharge: HOME | End: 2022-11-15
Payer: MEDICARE

## 2022-11-15 PROCEDURE — 97110 THERAPEUTIC EXERCISES: CPT

## 2022-11-15 NOTE — PROGRESS NOTES
[address]  [] EVALUATION  [x] DAILY NOTE (LAND) [] DAILY NOTE (AQUATIC ) [] PROGRESS NOTE [] DISCHARGE NOTE    [] [address] [facility]   [x] [address]    [] [facility]   [] Chriss Matson    Date: 11/15/2022  Patient Name:  Prabhakar Herbert  : 1950  MRN: [id number]  CSN: [id number]    Referring Practitioner Trenton Hudson MD   Diagnosis Complete rotator cuff tear or rupture of left shoulder, not specified as traumatic [M75.122]  Superior glenoid labrum lesion of left shoulder, initial encounter [B25.238M]    Treatment Diagnosis Decreased ROM L UE, increased pain L UE, decreased ADLs   Date of Evaluation 10/3/22      Functional Outcome Measure Used UEFS   Functional Outcome Score  (10/3/22)       Insurance: Primary: Payor: MEDICARE /  /  / ,   Secondary: BC   Authorization Information: PRECERTIFICATION REQUIRED:  No  INSURANCE THERAPY BENEFIT:  Patient has unlimited visits based on medical necessity  Benefit will not cover maintenance or preventative treatment. AQUATIC THERAPY COVERED:   Yes  MODALITIES COVERED:  Yes, with the exception of iontophoresis and hot packs/cold packs  TELEHEALTH COVERED: Yes   Visit # 13, 3/10 for PN; PN completed 11/3/22   Visits Allowed: unlimited   Recertification Date:    Physician Follow-Up: 22   Physician Orders: Follow protocol for massive RCR, BT, no resistive elbow flexion x 6 weeks   Pertinent History:  Patient reports that his shoulder gave out in 2022. Patient underwent surgery on 22 for arthroscopic RCR, BT, SAD, DCR, ED.      has a past medical history of Chondromalacia of right knee, Hypertension, and Legionnaire's disease ([facility] [address] SUBJECTIVE:  Patient states that he has been noticing some pain in his left bicep since not wearing the sling.       TREATMENT   Precautions: Goes by [facility]onal[facility] Cheek, surgery date 22, follow protocol for massive RCR, BT, no resistive elbow flexion x 6 weeks   Pain:  1/10 LOCATION: left upper arm along bicep    X in shaded column indicates Activity Completed Today   Modalities Parameters/  Location  Notes/Comments                     Manual Therapy Time/  Technique  Notes/Comments   STM to left upper trapezius region      STM to left upper trap, bicep, and deltoid            Exercises   Sets/  Sec Reps  Notes/Comments          Scapular retraction 1 15 x    Pendulums with left UE - all 3 directions 1 10 each x    Supine ER with dowel within 40 degree limitation 1 15 x    Supine PROM to left shoulder for flexion and ER at side per physician protocol   x    Backward shoulder circles 1 15 x    Bilateral table slides for shoulder flexion 1 15 x 5 second hold   Activities Time    Notes/Comments   Education for passive dressing and bathing      Adjusted sling for increased comfort                    Specific Interventions Next Treatment: follow protocol for massive RCR, BT, no resisitve elbow flexion x 6 weeks    Activity/Treatment Tolerance:  [x]  Patient tolerated treatment well  []  Patient limited by fatigue  []  Patient limited by pain   []  Patient limited by other medical complications  []  Other:     Assessment:  Patient is progressing toward goals appropriately. GOALS:  Patient Goal: return to previous functional level, return to part time jobs     Short Term Goals:  Time Frame: 4 weeks   Patient will increase left shoulder  passive flexion to 155 and ER at side to 55 to increase flexibility to allow patient to complete daily tasks. .   Report no more than 1/10 in left shoulder with HEP upgrades to increase his ability to use left arm in daily activities. Patient will be independent with HEP to increase ability for eventual return to work. 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    Patient will reach into upper cupboard to gather dishes for eating without difficulty. 3. x Be able to use his left arm to turn steering wheel without pain in left shoulder. Patient Education:   []  HEP/Education Completed:     [x]  No new Education completed  [x]  Reviewed Prior HEP      [x]  Patient verbalized and/or demonstrated understanding of education provided. []  Patient unable to verbalize and/or demonstrate understanding of education provided. Will continue education. [x]  Barriers to learning: none    PLAN:      []  Plan of care initiated. Plan to see patient 2 times per week for 12 weeks to address the treatment planned outlined above.   [x]  Continue with current plan of care  []  Modify plan of care as follows: see patient 2 x week x 7 weeks from 11/3/22   []  Hold pending physician visit  []  Discharge    Time In 1530   Time Out 1553   Timed Code Minutes: 23 min   Total Treatment Time: 23 min       Michael Colon OTR/L #[id number]

## 2022-11-17 ENCOUNTER — HOSPITAL ENCOUNTER (OUTPATIENT)
Dept: OCCUPATIONAL THERAPY | Age: 72
Setting detail: THERAPIES SERIES
Discharge: HOME | End: 2022-11-17
Payer: MEDICARE

## 2022-11-17 PROCEDURE — 97110 THERAPEUTIC EXERCISES: CPT

## 2022-11-17 NOTE — PROGRESS NOTES
[address]  [] EVALUATION  [x] DAILY NOTE (LAND) [] DAILY NOTE (AQUATIC ) [] PROGRESS NOTE [] DISCHARGE NOTE    [] [facility]   [x] [address]    [] [facility]   [] Constance Harm    Date: 2022  Patient Name:  Marianne Bloom  : 1950  MRN: [id number]  CSN: [id number]    Referring Practitioner Matthew Cazares MD   Diagnosis Complete rotator cuff tear or rupture of left shoulder, not specified as traumatic [M75.122]  Superior glenoid labrum lesion of left shoulder, initial encounter [O71.328B]    Treatment Diagnosis Decreased ROM L UE, increased pain L UE, decreased ADLs   Date of Evaluation 10/3/22      Functional Outcome Measure Used UEFS   Functional Outcome Score  (10/3/22)       Insurance: Primary: Payor: MEDICARE /  /  / ,   Secondary: BC   Authorization Information: PRECERTIFICATION REQUIRED:  No  INSURANCE THERAPY BENEFIT:  Patient has unlimited visits based on medical necessity  Benefit will not cover maintenance or preventative treatment. AQUATIC THERAPY COVERED:   Yes  MODALITIES COVERED:  Yes, with the exception of iontophoresis and hot packs/cold packs  TELEHEALTH COVERED: Yes   Visit # 14, 4/10 for PN; PN completed 11/3/22   Visits Allowed: unlimited   Recertification Date:    Physician Follow-Up: 22   Physician Orders: Follow protocol for massive RCR, BT, no resistive elbow flexion x 6 weeks   Pertinent History:  Patient reports that his shoulder gave out in 2022. Patient underwent surgery on 22 for arthroscopic RCR, BT, SAD, DCR, ED.      has a past medical history of Chondromalacia of right knee, Hypertension, and Legionnaire's disease ([facility] [address] SUBJECTIVE:  Patient states that he has noticed some slight pain in his left bicep.       TREATMENT   Precautions: Goes by Mihaela Noe, surgery date 22, follow protocol for massive RCR, BT, no resistive elbow flexion x 6 weeks   Pain:  1/10 LOCATION: left upper arm along bicep    X in shaded column indicates Activity Completed Today   Modalities Parameters/  Location  Notes/Comments                     Manual Therapy Time/  Technique  Notes/Comments   STM to left upper trapezius region      STM to left upper trap, bicep, and deltoid            Exercises   Sets/  Sec Reps  Notes/Comments          Scapular retraction 1 15 x    Pendulums with left UE - all 3 directions 1 10 each x    Supine ER with dowel within 40 degree limitation 1 15     Supine PROM to left shoulder for flexion and ER at side per physician protocol   x    Backward shoulder circles 1 15 x    Bilateral table slides for shoulder flexion 1 20 x 5 second hold   Activities Time    Notes/Comments   Education for passive dressing and bathing      Adjusted sling for increased comfort                    Specific Interventions Next Treatment: follow protocol for massive RCR, BT, no resisitve elbow flexion x 6 weeks    Activity/Treatment Tolerance:  [x]  Patient tolerated treatment well  []  Patient limited by fatigue  []  Patient limited by pain   []  Patient limited by other medical complications  []  Other:     Assessment:  Patient is progressing nicely. GOALS:  Patient Goal: return to previous functional level, return to part time jobs     Short Term Goals:  Time Frame: 4 weeks   Patient will increase left shoulder  passive flexion to 155 and ER at side to 55 to increase flexibility to allow patient to complete daily tasks. .   Report no more than 1/10 in left shoulder with HEP upgrades to increase his ability to use left arm in daily activities. Patient will be independent with HEP to increase ability for eventual return to work. 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 Patient will reach into upper cupboard to gather dishes for eating without difficulty.    3. x Be able to use his left arm to turn steering wheel without pain in left shoulder. Patient Education:   []  HEP/Education Completed:     [x]  No new Education completed  [x]  Reviewed Prior HEP      [x]  Patient verbalized and/or demonstrated understanding of education provided. []  Patient unable to verbalize and/or demonstrate understanding of education provided. Will continue education. [x]  Barriers to learning: none    PLAN:      []  Plan of care initiated. Plan to see patient 2 times per week for 12 weeks to address the treatment planned outlined above.   [x]  Continue with current plan of care  []  Modify plan of care as follows: see patient 2 x week x 7 weeks from 11/3/22   []  Hold pending physician visit  []  Discharge    Time In 1630   Time Out 1658   Timed Code Minutes: 28 min   Total Treatment Time: 28 min       Kirit Anderson OTR/ANTON #[id number]

## 2022-11-22 ENCOUNTER — HOSPITAL ENCOUNTER (OUTPATIENT)
Dept: OCCUPATIONAL THERAPY | Age: 72
Setting detail: THERAPIES SERIES
Discharge: HOME | End: 2022-11-22
Payer: MEDICARE

## 2022-11-22 PROCEDURE — 97110 THERAPEUTIC EXERCISES: CPT

## 2022-11-22 NOTE — PROGRESS NOTES
[address]  [] EVALUATION  [x] DAILY NOTE (LAND) [] DAILY NOTE (AQUATIC ) [] PROGRESS NOTE [] DISCHARGE NOTE    [] [facility]   [x] [address]    [] [facility]   [] Jackie Bell    Date: 2022  Patient Name:  Alina Zaidi  : 1950  MRN: [id number]  CSN: [id number]    Referring Practitioner Yen Maki MD   Diagnosis Complete rotator cuff tear or rupture of left shoulder, not specified as traumatic [M75.122]  Superior glenoid labrum lesion of left shoulder, initial encounter [Y24.561K]    Treatment Diagnosis Decreased ROM L UE, increased pain L UE, decreased ADLs   Date of Evaluation 10/3/22      Functional Outcome Measure Used UEFS   Functional Outcome Score  (10/3/22)       Insurance: Primary: Payor: MEDICARE /  /  / ,   Secondary: BCBS   Authorization Information: PRECERTIFICATION REQUIRED:  No  INSURANCE THERAPY BENEFIT:  Patient has unlimited visits based on medical necessity  Benefit will not cover maintenance or preventative treatment. AQUATIC THERAPY COVERED:   Yes  MODALITIES COVERED:  Yes, with the exception of iontophoresis and hot packs/cold packs  TELEHEALTH COVERED: Yes   Visit # 15, 5/10 for PN; PN completed 11/3/22   Visits Allowed: unlimited   Recertification Date:    Physician Follow-Up: 22   Physician Orders: Follow protocol for massive RCR, BT, no resistive elbow flexion x 6 weeks   Pertinent History:  Patient reports that his shoulder gave out in 2022. Patient underwent surgery on 22 for arthroscopic RCR, BT, SAD, DCR, ED.      has a past medical history of Chondromalacia of right knee, Hypertension, and Legionnaire's disease ([facility] [address] SUBJECTIVE:  Reports that he hasn't had any pain since last therapy session.       TREATMENT   Precautions: Goes by March Current, surgery date 22, follow protocol for massive RCR, BT, no resistive elbow flexion x 6 weeks   Pain:  No pain at time of therapy LOCATION: left upper arm along bicep    X in shaded column indicates Activity Completed Today   Modalities Parameters/  Location  Notes/Comments                     Manual Therapy Time/  Technique  Notes/Comments   STM to left upper trapezius region      STM to left upper trap, bicep, and deltoid            Exercises   Sets/  Sec Reps  Notes/Comments   Pulleys for shoulder flexion 1 15 x    Scapular retraction 1 15 x    Pendulums with left UE - all 3 directions 1 10 each     Supine ER with dowel within 40 degree limitation 1 15     Submaximal isometrics with left UE - shoulder flexion, extension, abduction, IR, and ER 1 10 each x 5 second hold   Supine dowel for shoulder flexion and chest press 1 15 each x           Supine PROM to left shoulder in all planes to patient tolerance   x    Backward shoulder circles 1 15 x    Bilateral table slides for shoulder flexion 1 20  5 second hold   Activities Time    Notes/Comments   Education for passive dressing and bathing      Adjusted sling for increased comfort                Specific Interventions Next Treatment: follow protocol for massive RCR, BT, no resisitve elbow flexion x 6 weeks    Activity/Treatment Tolerance:  [x]  Patient tolerated treatment well  []  Patient limited by fatigue  []  Patient limited by pain   []  Patient limited by other medical complications  []  Other:     Assessment:  Patient is progressing nicely toward goals. Patient was able to tolerate increase in activity this date without difficulty. GOALS:  Patient Goal: return to previous functional level, return to part time jobs     Short Term Goals:  Time Frame: 4 weeks   Patient will increase left shoulder  passive flexion to 155 and ER at side to 55 to increase flexibility to allow patient to complete daily tasks. .   Report no more than 1/10 in left shoulder with HEP upgrades to increase his ability to use left arm in daily activities.   Patient will be independent with HEP to increase ability for eventual return to work. 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 Patient will reach into upper cupboard to gather dishes for eating without difficulty. 3.  Be able to use his left arm to turn steering wheel without pain in left shoulder. Patient Education:   [x]  HEP/Education Completed: submaximal isometrics for shoulder flexion, extension, IR, ER, and abduction; supine dowel for shoulder flexion and chest press    []  No new Education completed  [x]  Reviewed Prior HEP      [x]  Patient verbalized and/or demonstrated understanding of education provided. []  Patient unable to verbalize and/or demonstrate understanding of education provided. Will continue education. [x]  Barriers to learning: none    PLAN:      []  Plan of care initiated. Plan to see patient 2 times per week for 12 weeks to address the treatment planned outlined above.   [x]  Continue with current plan of care  []  Modify plan of care as follows: see patient 2 x week x 7 weeks from 11/3/22   []  Hold pending physician visit  []  Discharge    Time In 1530   Time Out 1600   Timed Code Minutes: 30 min   Total Treatment Time: 30 min       Camila Layton OTR/L #[id number]

## 2022-11-29 ENCOUNTER — HOSPITAL ENCOUNTER (OUTPATIENT)
Dept: OCCUPATIONAL THERAPY | Age: 72
Setting detail: THERAPIES SERIES
Discharge: HOME | End: 2022-11-29
Payer: MEDICARE

## 2022-11-29 PROCEDURE — 97110 THERAPEUTIC EXERCISES: CPT

## 2022-11-29 PROCEDURE — 97140 MANUAL THERAPY 1/> REGIONS: CPT

## 2022-11-29 NOTE — PROGRESS NOTES
[address]  [] EVALUATION  [x] DAILY NOTE (LAND) [] DAILY NOTE (AQUATIC ) [] PROGRESS NOTE [] DISCHARGE NOTE    [] [address] [facility]   [x] [address]    [] [facility]   [] Sabrina Shaikh    Date: 2022  Patient Name:  Des Barr  : 1950  MRN: [id number]  CSN: [id number]    Referring Practitioner Gaye Hoyt MD   Diagnosis Complete rotator cuff tear or rupture of left shoulder, not specified as traumatic [M75.122]  Superior glenoid labrum lesion of left shoulder, initial encounter [K56.990P]    Treatment Diagnosis Decreased ROM L UE, increased pain L UE, decreased ADLs   Date of Evaluation 10/3/22      Functional Outcome Measure Used UEFS   Functional Outcome Score  (10/3/22)       Insurance: Primary: Payor: MEDICARE /  /  / ,   Secondary: BC   Authorization Information: PRECERTIFICATION REQUIRED:  No  INSURANCE THERAPY BENEFIT:  Patient has unlimited visits based on medical necessity  Benefit will not cover maintenance or preventative treatment. AQUATIC THERAPY COVERED:   Yes  MODALITIES COVERED:  Yes, with the exception of iontophoresis and hot packs/cold packs  TELEHEALTH COVERED: Yes   Visit # 16, 6/10 for PN; PN completed 11/3/22   Visits Allowed: unlimited   Recertification Date:    Physician Follow-Up: 22   Physician Orders: Follow protocol for massive RCR, BT, no resistive elbow flexion x 6 weeks   Pertinent History:  Patient reports that his shoulder gave out in 2022. Patient underwent surgery on 22 for arthroscopic RCR, BT, SAD, DCR, ED.      has a past medical history of Chondromalacia of right knee, Hypertension, and Legionnaire's disease ([facility] [address] SUBJECTIVE:  Patient reports discomfort at lateral/anterior distal bicep that comes and goes throughout tasks.  Patient reports his biggest difficulty is reaching out for household tasks that he knows he is not able to complete yet. TREATMENT   Precautions: Goes by Rosezena Krabbe, surgery date 9/28/22, follow protocol for massive RCR, BT, no resistive elbow flexion x 6 weeks   Pain:  No pain at time of therapy LOCATION: left upper arm along bicep    X in shaded column indicates Activity Completed Today   Modalities Parameters/  Location  Notes/Comments                     Manual Therapy Time/  Technique  Notes/Comments   STM to left upper trapezius region      STM to left upper trap, bicep, and deltoid            Exercises   Sets/  Sec Reps  Notes/Comments   Pulleys for shoulder flexion 1 15 x    Scapular retraction 1 15 x    Pendulums with left UE - all 3 directions 1 10 each     Supine ER with dowel within 40 degree limitation 1 15     Submaximal isometrics with left UE - shoulder flexion, extension, abduction, IR, and ER 1 10 each x 5 second hold   Supine dowel for shoulder flexion and chest press 1 15 each x           Supine PROM to left shoulder in all planes to patient tolerance   x Good ROM noted all planes, tight end ranges noted but patient denies pain with progressive stretching    Backward shoulder circles 1 15 x    Bilateral table slides for shoulder flexion 1 20  5 second hold   Activities Time    Notes/Comments   Education for passive dressing and bathing      Adjusted sling for increased comfort                Specific Interventions Next Treatment: follow protocol for massive RCR, BT, no resisitve elbow flexion x 6 weeks    Activity/Treatment Tolerance:  [x]  Patient tolerated treatment well  []  Patient limited by fatigue  []  Patient limited by pain   []  Patient limited by other medical complications  []  Other:     Assessment:  Patient is progressing nicely toward goals. Good ROM noted all planes. Patient will be 9 week post op on Wednesday, will progress per protocol next session.      GOALS:  Patient Goal: return to previous functional level, return to part time jobs     Short Term Goals:  Time Frame: 4 weeks   Patient will increase left shoulder  passive flexion to 155 and ER at side to 55 to increase flexibility to allow patient to complete daily tasks. .   Report no more than 1/10 in left shoulder with HEP upgrades to increase his ability to use left arm in daily activities. Patient will be independent with HEP to increase ability for eventual return to work. 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 Patient will reach into upper cupboard to gather dishes for eating without difficulty. 3.  Be able to use his left arm to turn steering wheel without pain in left shoulder. Patient Education:   []  HEP/Education Completed: submaximal isometrics for shoulder flexion, extension, IR, ER, and abduction; supine dowel for shoulder flexion and chest press    []  No new Education completed  [x]  Reviewed Prior HEP      [x]  Patient verbalized and/or demonstrated understanding of education provided. []  Patient unable to verbalize and/or demonstrate understanding of education provided. Will continue education. [x]  Barriers to learning: none    PLAN:      []  Plan of care initiated. Plan to see patient 2 times per week for 12 weeks to address the treatment planned outlined above.   [x]  Continue with current plan of care  []  Modify plan of care as follows: see patient 2 x week x 7 weeks from 11/3/22   []  Hold pending physician visit  []  Discharge    Time In 95 20 92   Time Out 0937   Timed Code Minutes: 31 min   Total Treatment Time: 31 min       Geovanny WETZEL/ANTON #[id number]

## 2022-12-01 ENCOUNTER — HOSPITAL ENCOUNTER (OUTPATIENT)
Dept: OCCUPATIONAL THERAPY | Age: 72
Setting detail: THERAPIES SERIES
Discharge: HOME | End: 2022-12-01
Payer: MEDICARE

## 2022-12-01 PROCEDURE — 97140 MANUAL THERAPY 1/> REGIONS: CPT

## 2022-12-01 PROCEDURE — 97110 THERAPEUTIC EXERCISES: CPT

## 2022-12-01 NOTE — PROGRESS NOTES
stretching. TREATMENT   Precautions: Goes by Linnea Patel, surgery date 9/28/22, follow protocol for massive RCR, BT, no resistive elbow flexion x 6 weeks   Pain:  No pain at time of therapy LOCATION: left upper arm along bicep    X in shaded column indicates Activity Completed Today   Modalities Parameters/  Location  Notes/Comments                     Manual Therapy Time/  Technique  Notes/Comments   STM to left upper trapezius region      STM to left upper trap, bicep, and deltoid            Exercises   Sets/  Sec Reps  Notes/Comments   Pulleys for shoulder flexion 1 15 x    Scapular retraction 1 15 x    Pendulums with left UE - all 3 directions 1 10 each     Supine ER with dowel within 40 degree limitation 1 15     Submaximal isometrics with left UE - shoulder flexion, extension, abduction, IR, and ER 1 10 each x 5 second hold   Supine dowel for shoulder flexion and chest press 1 15 each x           Supine PROM to left shoulder in all planes to patient tolerance   x Good ROM noted all planes, tight end ranges noted but patient denies pain with progressive stretching    Cross body posterior capsule stretch  15 sec 5 x Initiated this date per protocol. Completed in supine by therapist with good tolerance, good stretch noted. Added to HEP   IR towel stretch  10 sec 3 x Initiated this date. Cues for technique.  Stretch and discomfort noted    Backward shoulder circles 1 15     Bilateral table slides for shoulder flexion 1 20  5 second hold   Activities Time    Notes/Comments   Education for passive dressing and bathing      Adjusted sling for increased comfort                Specific Interventions Next Treatment: follow protocol for massive RCR, BT, no resisitve elbow flexion x 6 weeks    Activity/Treatment Tolerance:  [x]  Patient tolerated treatment well  []  Patient limited by fatigue  []  Patient limited by pain   []  Patient limited by other medical complications  []  Other:     Assessment:  Patient is progressing nicely toward goals. Progressed per protocol this session noted above, with overall fair tolerance and stretch noted. GOALS:  Patient Goal: return to previous functional level, return to part time jobs     Short Term Goals:  Time Frame: 4 weeks   Patient will increase left shoulder  passive flexion to 155 and ER at side to 55 to increase flexibility to allow patient to complete daily tasks. .   Report no more than 1/10 in left shoulder with HEP upgrades to increase his ability to use left arm in daily activities. Patient will be independent with HEP to increase ability for eventual return to work. 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 Patient will reach into upper cupboard to gather dishes for eating without difficulty. 3.  Be able to use his left arm to turn steering wheel without pain in left shoulder. Patient Education:   [x]  HEP/Education Completed: 12/1- seated or supine posterior capsule stretch with handout provided     []  No new Education completed  []  Reviewed Prior HEP      [x]  Patient verbalized and/or demonstrated understanding of education provided. []  Patient unable to verbalize and/or demonstrate understanding of education provided. Will continue education. [x]  Barriers to learning: none    PLAN:      []  Plan of care initiated. Plan to see patient 2 times per week for 12 weeks to address the treatment planned outlined above.   [x]  Continue with current plan of care  []  Modify plan of care as follows: see patient 2 x week x 7 weeks from 11/3/22   []  Hold pending physician visit  []  Discharge    Time In [id number]   Time Out 0908   Timed Code Minutes: 29 min   Total Treatment Time: 29 min       Lilian RODRIGUEZ #[id number]

## 2022-12-05 ENCOUNTER — HOSPITAL ENCOUNTER (OUTPATIENT)
Dept: OCCUPATIONAL THERAPY | Age: 72
Setting detail: THERAPIES SERIES
Discharge: HOME | End: 2022-12-05
Payer: MEDICARE

## 2022-12-05 PROCEDURE — 97110 THERAPEUTIC EXERCISES: CPT

## 2022-12-05 NOTE — PROGRESS NOTES
[address]  [] EVALUATION  [x] DAILY NOTE (LAND) [] DAILY NOTE (AQUATIC ) [] PROGRESS NOTE [] DISCHARGE NOTE    [] [facility]   [x] [address]    [] [facility]   [] Denton Morales    Date: 2022  Patient Name:  Andria Guadarrama  : 1950  MRN: [id number]  CSN: [id number]    Referring Practitioner Morena Ivey MD   Diagnosis Complete rotator cuff tear or rupture of left shoulder, not specified as traumatic [M75.122]  Superior glenoid labrum lesion of left shoulder, initial encounter [H16.301K]    Treatment Diagnosis Decreased ROM L UE, increased pain L UE, decreased ADLs   Date of Evaluation 10/3/22      Functional Outcome Measure Used UEFS   Functional Outcome Score  (10/3/22)       Insurance: Primary: Payor: MEDICARE /  /  / ,   Secondary: BC   Authorization Information: PRECERTIFICATION REQUIRED:  No  INSURANCE THERAPY BENEFIT:  Patient has unlimited visits based on medical necessity  Benefit will not cover maintenance or preventative treatment. AQUATIC THERAPY COVERED:   Yes  MODALITIES COVERED:  Yes, with the exception of iontophoresis and hot packs/cold packs  TELEHEALTH COVERED: Yes   Visit # 18, 8/10 for PN; PN completed 11/3/22   Visits Allowed: unlimited   Recertification Date:    Physician Follow-Up: 22   Physician Orders: Follow protocol for massive RCR, BT, no resistive elbow flexion x 6 weeks   Pertinent History:  Patient reports that his shoulder gave out in 2022. Patient underwent surgery on 22 for arthroscopic RCR, BT, SAD, DCR, ED.      has a past medical history of Chondromalacia of right knee, Hypertension, and Legionnaire's disease ([facility] [address] 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 States that he did have some difficulty opening window with left hand [place].      TREATMENT   Precautions: Goes by Tr, surgery date 9/28/22, follow protocol for massive RCR, BT, no resistive elbow flexion x 6 weeks   Pain:  No pain at time of therapy LOCATION: left upper arm along bicep    X in shaded column indicates Activity Completed Today   Modalities Parameters/  Location  Notes/Comments                     Manual Therapy Time/  Technique  Notes/Comments   STM to left upper trapezius region      STM to left upper trap, bicep, and deltoid            Exercises   Sets/  Sec Reps  Notes/Comments   Pulleys for shoulder flexion 1 15 x    Scapular retraction 1 15     Pendulums with left UE - all 3 directions 1 10 each     Supine ER with dowel within 40 degree limitation 1 15     Submaximal isometrics with left UE - shoulder flexion, extension, abduction, IR, and ER 1 10 each x 5 second hold   Supine dowel for shoulder flexion and chest press 1 15 each     Biodex at 90 speed x 3 minutes forward and 3 minutes backward   x    Supine PROM to left shoulder in all planes to patient tolerance   x    Cross body posterior capsule stretch  15 sec 5 x    IR towel stretch  10 sec 3  Initiated this date. Cues for technique. Stretch and discomfort noted    Backward shoulder circles 1 15     Bilateral wall slides for shoulder flexion 1 15 x 5 second hold   Activities Time    Notes/Comments   Education for passive dressing and bathing      Adjusted sling for increased comfort                Specific Interventions Next Treatment: follow protocol for massive RCR, BT, no resisitve elbow flexion x 6 weeks    Activity/Treatment Tolerance:  [x]  Patient tolerated treatment well  []  Patient limited by fatigue  []  Patient limited by pain   []  Patient limited by other medical complications  []  Other:     Assessment:   Patient is progressing toward goals nicely.      GOALS:  Patient Goal: return to previous functional level, return to part time jobs     Short Term Goals:  Time Frame: 4 weeks   Patient will increase left shoulder  passive flexion to 155 and ER at side to 55 to increase flexibility to allow patient to complete daily tasks. .   Report no more than 1/10 in left shoulder with HEP upgrades to increase his ability to use left arm in daily activities. Patient will be independent with HEP to increase ability for eventual return to work. 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 Patient will reach into upper cupboard to gather dishes for eating without difficulty. 3.  Be able to use his left arm to turn steering wheel without pain in left shoulder. Patient Education:   [x]  HEP/Education Completed: to do bilateral wall slides instead of table slides for shoulder flexion    []  No new Education completed  []  Reviewed Prior HEP      [x]  Patient verbalized and/or demonstrated understanding of education provided. []  Patient unable to verbalize and/or demonstrate understanding of education provided. Will continue education. [x]  Barriers to learning: none    PLAN:      []  Plan of care initiated. Plan to see patient 2 times per week for 12 weeks to address the treatment planned outlined above.   [x]  Continue with current plan of care  []  Modify plan of care as follows: see patient 2 x week x 7 weeks from 11/3/22   []  Hold pending physician visit  []  Discharge    Time In 0900   Time Out 0930   Timed Code Minutes: 30 min   Total Treatment Time: 30 min       Susy Ramirez OTR/L #[id number]

## 2022-12-08 ENCOUNTER — HOSPITAL ENCOUNTER (OUTPATIENT)
Dept: OCCUPATIONAL THERAPY | Age: 72
Setting detail: THERAPIES SERIES
Discharge: HOME | End: 2022-12-08
Payer: MEDICARE

## 2022-12-08 PROCEDURE — 97110 THERAPEUTIC EXERCISES: CPT

## 2022-12-08 NOTE — PROGRESS NOTES
** PLEASE SIGN, DATE AND TIME CERTIFICATION BELOW AND RETURN TO [facility] OUTPATIENT REHABILITATION (FAX #: [phone]). ATTEST/CO-SIGN IF ACCESSING VIA [employer]. THANK YOU.**    I certify that I have examined the patient below and determined that Physical Medicine and Rehabilitation service is necessary and that I approve the established plan of care for up to 90 days or as specifically noted. Attestation, signature or co-signature of physician indicates approval of certification requirements.    ________________________ ____________ __________  Physician Signature   Date   Time     [address]  [] EVALUATION  [] DAILY NOTE (LAND) [] DAILY NOTE (AQUATIC ) [x] PROGRESS NOTE [] DISCHARGE NOTE    [] [address] [facility]   [x] [place] [address]    [] [facility]   [] [facility]    Date: 2022  Patient Name:  Bubba Armstrong  : 1950  MRN: [id number]  CSN: [id number]    Referring Practitioner Shakira Johnson MD   Diagnosis Complete rotator cuff tear or rupture of left shoulder, not specified as traumatic [M75.122]  Superior glenoid labrum lesion of left shoulder, initial encounter [D09.549I]    Treatment Diagnosis Decreased ROM L UE, increased pain L UE, decreased ADLs   Date of Evaluation 10/3/22      Functional Outcome Measure Used UEFS   Functional Outcome Score 680 (10/3/22)       Insurance: Primary: Payor: MEDICARE /  /  / ,   Secondary: [facility]   Authorization Information: PRECERTIFICATION REQUIRED:  No  INSURANCE THERAPY BENEFIT:  Patient has unlimited visits based on medical necessity  Benefit will not cover maintenance or preventative treatment.   AQUATIC THERAPY COVERED:   Yes  MODALITIES COVERED:  Yes, with the exception of iontophoresis and hot packs/cold pack  TELEHEALTH COVERED: Yes   Visit # 19, PN completed on 22   Visits Allowed: unlimited   Recertification Date: 55   Physician Follow-Up: 12/22/22   Physician Orders: Follow protocol for massive RCR, BT, no resistive elbow flexion x 6 weeks   Pertinent History:  Patient reports that his shoulder gave out in January of 2022. Patient underwent surgery on 9/28/22 for arthroscopic RCR, BT, SAD, DCR, ED.      has a past medical history of Chondromalacia of right knee, Hypertension, and Legionnaire's disease ([address] SUBJECTIVE:  Patient states that overall he feels as though he is doing \"fine. \" States that he is able to do most of his activities but he still does watch what he does. 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 TREATMENT   Precautions: Goes by Viridiana Mathew, surgery date 9/28/22, follow protocol for massive RCR, BT, no resistive elbow flexion x 6 weeks   Pain:  No pain at time of therapy LOCATION: left upper arm along bicep    X in shaded column indicates Activity Completed Today   Modalities Parameters/  Location  Notes/Comments                     Manual Therapy Time/  Technique  Notes/Comments   STM to left upper trapezius region      STM to left upper trap, bicep, and deltoid            Exercises   Sets/  Sec Reps  Notes/Comments   Pulleys for shoulder flexion 1 15     Scapular retraction 1 15     Pendulums with left UE - all 3 directions 1 10 each     Supine ER with dowel within 40 degree limitation 1 15     Submaximal isometrics with left UE - shoulder flexion, extension, abduction, IR, and ER 1 10 each  5 second hold   Supine dowel for shoulder flexion and chest press 1 15 each     Biodex at 90 speed x 3 minutes forward and 3 minutes backward   x    Supine PROM to left shoulder in all planes to patient tolerance       Cross body posterior capsule stretch  15 sec 5     IR towel stretch  10 sec 3  Initiated this date. Cues for technique.  Stretch and discomfort noted    Backward shoulder circles 1 15     Bilateral wall GOAL NOT MET - RELATES THAT PAIN GOES TO 4/10 WITH STRETCHING - CONTINUE GOAL  Patient will be independent with HEP to increase ability for eventual return to work. GOAL MET - CONTINUE GOAL WITH HEP UPGRADES  GOAL INITIATED: Increase active left shoulder flexion to 145, abduction to 150, ER at 90 to 70, and get left thumb 4\" above waistband behind back to increase his ability to reach overhead for all normal activities. 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 GOAL MET - REVISED GOAL: Be able to drive standard transmission vehicle without difficulty. Patient will reach into upper cupboard to gather dishes for eating without difficulty. 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 3.  Be able to use his left arm to turn steering wheel without pain in left shoulder. GOAL MET - REVISED GOAL: Be able to use left arm to move heavy grocery bags from cart to car without difficulty. Patient Education:   [x]  HEP/Education Completed: goal status    []  No new Education completed  []  Reviewed Prior HEP      [x]  Patient verbalized and/or demonstrated understanding of education provided. []  Patient unable to verbalize and/or demonstrate understanding of education provided. Will continue education. [x]  Barriers to learning: none    PLAN:      []  Plan of care initiated. Plan to see patient 2 times per week for 12 weeks to address the treatment planned outlined above.   []  Continue with current plan of care  [x]  Modify plan of care as follows: see patient 2 x week x 12 weeks from 12/8/22   []  Hold pending physician visit  []  Discharge    Time In 0800   Time Out 0830   Timed Code Minutes: 30 min   Total Treatment Time: 30 min       Warner Fishman OTR/L #[id number]

## 2022-12-12 ENCOUNTER — HOSPITAL ENCOUNTER (OUTPATIENT)
Dept: OCCUPATIONAL THERAPY | Age: 72
Setting detail: THERAPIES SERIES
Discharge: HOME | End: 2022-12-12
Payer: MEDICARE

## 2022-12-12 PROCEDURE — 97110 THERAPEUTIC EXERCISES: CPT

## 2022-12-12 NOTE — PROGRESS NOTES
[address]  [] EVALUATION  [x] DAILY NOTE (LAND) [] DAILY NOTE (AQUATIC )   [] PROGRESS NOTE [] DISCHARGE NOTE    [] [address] [facility]   [x] [address]    [] [facility]   [] [facility]    Date: 2022  Patient Name:  Curry Doherty  : 1950  MRN: [id number]  CSN: [id number]    Referring Practitioner Oz Corona MD   Diagnosis Complete rotator cuff tear or rupture of left shoulder, not specified as traumatic [M75.122]  Superior glenoid labrum lesion of left shoulder, initial encounter [U65.188V]    Treatment Diagnosis Decreased ROM L UE, increased pain L UE, decreased ADLs   Date of Evaluation 10/3/22      Functional Outcome Measure Used UEFS   Functional Outcome Score  (10/3/22)       Insurance: Primary: Payor: MEDICARE /  /  / ,   Secondary: BC   Authorization Information: PRECERTIFICATION REQUIRED:  No  INSURANCE THERAPY BENEFIT:  Patient has unlimited visits based on medical necessity  Benefit will not cover maintenance or preventative treatment. AQUATIC THERAPY COVERED:   Yes  MODALITIES COVERED:  Yes, with the exception of iontophoresis and hot packs/cold pack  TELEHEALTH COVERED: Yes   Visit # 20, 1/10 for PN; PN completed on 22   Visits Allowed: Unlimited   Recertification Date: 09   Physician Follow-Up: 22   Physician Orders: Follow protocol for massive RCR, BT, no resistive elbow flexion x 6 weeks   Pertinent History:  Patient reports that his shoulder gave out in 2022. Patient underwent surgery on 22 for arthroscopic RCR, BT, SAD, DCR, ED.      has a past medical history of Chondromalacia of right knee, Hypertension, and Legionnaire's disease ([facility] [address] SUBJECTIVE:  Patient reports his left shoulder is improving. Nothing new to report today.     TREATMENT   Precautions: Goes by Qi Robbins, surgery date 22, follow protocol for massive RCR, BT, no resistive elbow flexion x 6 weeks   Pain:  No pain at time of therapy LOCATION: left upper arm along bicep    X in shaded column indicates Activity Completed Today   Modalities Parameters/  Location  Notes/Comments                     Manual Therapy Time/  Technique  Notes/Comments   STM to left upper trapezius region  x    STM to left upper trap, bicep, and deltoid            Exercises   Sets/  Sec Reps  Notes/Comments   Pulleys for shoulder flexion 1 15 x Warm up   Scapular retraction 1 15 x    Pendulums with left UE - all 3 directions 1 10 each     Supine ER with dowel within 40 degree limitation 1 15     Submaximal isometrics with left UE - shoulder flexion, extension, abduction, IR, and ER 1 10 each  5 second hold   Supine dowel for shoulder flexion and chest press 1 10 each x    Biodex at 90 speed x 3 minutes forward and 3 minutes backward   x    Supine PROM to left shoulder in all planes to patient tolerance   x    Cross body posterior capsule stretch  15 sec 5     IR towel stretch behind the back 10 sec 5 x    Supine AROM flexion to 90 degrees, sidelying ER with elbow at side, sidelying abduction to 90 degrees 1 10 ea x Good strength noted with no complaints   Supine LUE propped on towel roll 1# bicep curl  1 15 x    Activities Time    Notes/Comments   Education for passive dressing and bathing      Adjusted sling for increased comfort                Specific Interventions Next Treatment: follow protocol for massive RCR, BT, no resisitve elbow flexion x 6 weeks    Activity/Treatment Tolerance:  [x]  Patient tolerated treatment well  []  Patient limited by fatigue  []  Patient limited by pain   []  Patient limited by other medical complications  []  Other:     Assessment: Patient is progressing towards goals. No complaints with small strengthening advancements today.      GOALS:  Patient Goal: return to previous functional level, return to part time jobs     Short Term Goals:  Time Frame: 4 weeks   Patient will increase left shoulder  passive flexion to 155 and ER at side to 55 to increase flexibility to allow patient to complete daily tasks. GOAL MET - SEE ABOVE FOR DETAILS - REVISED GOAL: Increase passive left shoulder flexion to 165, abduction to 125, ER at 90 to 55, and IR to 65 to increase flexibility to allow patient to complete his premorbid work and daily tasks. Report no more than 1/10 in left shoulder with HEP upgrades to increase his ability to use left arm in daily activities. GOAL NOT MET - RELATES THAT PAIN GOES TO 4/10 WITH STRETCHING - CONTINUE GOAL  Patient will be independent with HEP to increase ability for eventual return to work. GOAL MET - CONTINUE GOAL WITH HEP UPGRADES  GOAL INITIATED: Increase active left shoulder flexion to 145, abduction to 150, ER at 90 to 70, and get left thumb 4\" above waistband behind back to increase his ability to reach overhead for all normal activities. 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 GOAL MET - REVISED GOAL: Be able to drive standard transmission vehicle without difficulty. Patient will reach into upper cupboard to gather dishes for eating without difficulty. 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 3.  Be able to use his left arm to turn steering wheel without pain in left shoulder. GOAL MET - REVISED GOAL: Be able to use left arm to move heavy grocery bags from cart to car without difficulty. Patient Education:   []  HEP/Education Completed: goal status    [x]  No new Education completed  []  Reviewed Prior HEP      [x]  Patient verbalized and/or demonstrated understanding of education provided. []  Patient unable to verbalize and/or demonstrate understanding of education provided. Will continue education. [x]  Barriers to learning: none    PLAN:      []  Plan of care initiated.   Plan to see patient 2 times per week for 12 weeks to address the treatment planned outlined above.   [x]  Continue with current plan of care  []  Modify plan of care as follows: see patient 2 x week x 12 weeks from 12/8/22   []  Hold pending physician visit  []  Discharge    Time In 0930   Time Out 1000   Timed Code Minutes: 30 min   Total Treatment Time: 30 min       MARKO WETZEL/ANTON #[id number]

## 2022-12-15 ENCOUNTER — HOSPITAL ENCOUNTER (OUTPATIENT)
Dept: OCCUPATIONAL THERAPY | Age: 72
Setting detail: THERAPIES SERIES
Discharge: HOME | End: 2022-12-15
Payer: MEDICARE

## 2022-12-15 PROCEDURE — 97110 THERAPEUTIC EXERCISES: CPT

## 2022-12-15 NOTE — PROGRESS NOTES
[address]  [] EVALUATION  [x] DAILY NOTE (LAND) [] DAILY NOTE (AQUATIC )   [] PROGRESS NOTE [] DISCHARGE NOTE    [] [address] [facility]   [x] [address]    [] [facility]   [] [facility]fer[facility]    Date: 12/15/2022  Patient Name:  Sera Benavidez  : 1950  MRN: [id number]  CSN: [id number]    Referring Practitioner Tyrel White MD   Diagnosis Complete rotator cuff tear or rupture of left shoulder, not specified as traumatic [M75.122]  Superior glenoid labrum lesion of left shoulder, initial encounter [M36.134W]    Treatment Diagnosis Decreased ROM L UE, increased pain L UE, decreased ADLs   Date of Evaluation 10/3/22      Functional Outcome Measure Used UEFS   Functional Outcome Score  (10/3/22)       Insurance: Primary: Payor: MEDICARE /  /  / ,   Secondary: BC   Authorization Information: PRECERTIFICATION REQUIRED:  No  INSURANCE THERAPY BENEFIT:  Patient has unlimited visits based on medical necessity  Benefit will not cover maintenance or preventative treatment. AQUATIC THERAPY COVERED:   Yes  MODALITIES COVERED:  Yes, with the exception of iontophoresis and hot packs/cold pack  TELEHEALTH COVERED: Yes   Visit # 21, 2/10 for PN; PN completed on 22   Visits Allowed: Unlimited   Recertification Date: 71   Physician Follow-Up: 22   Physician Orders: Follow protocol for massive RCR, BT, no resistive elbow flexion x 6 weeks   Pertinent History:  Patient reports that his shoulder gave out in 2022. Patient underwent surgery on 22 for arthroscopic RCR, BT, SAD, DCR, ED.      has a past medical history of Chondromalacia of right knee, Hypertension, and Legionnaire's disease ([facility] [address] SUBJECTIVE:  Patient reports he tolerated session well. States he does have some intermittent tingling down the arm, this has been present since after surgery.      TREATMENT   Precautions: Goes by Rosie Thayer surgery date 9/28/22, follow protocol for massive RCR, BT, no resistive elbow flexion x 6 weeks   Pain:  No pain at time of therapy LOCATION: left upper arm along bicep    X in shaded column indicates Activity Completed Today   Modalities Parameters/  Location  Notes/Comments                     Manual Therapy Time/  Technique  Notes/Comments   STM to left upper trapezius region  x    STM to left upper trap, bicep, and deltoid            Exercises   Sets/  Sec Reps  Notes/Comments   Pulleys for shoulder flexion 1 15 x Warm up   Scapular retraction 1 15 x    Pendulums with left UE - all 3 directions 1 10 each     Supine ER with dowel within 40 degree limitation 1 15     Submaximal isometrics with left UE - shoulder flexion, extension, abduction, IR, and ER 1 10 each  5 second hold   Supine dowel for shoulder flexion and chest press 1 10 each x    Biodex at 75 speed x 3 minutes forward and 3 minutes backward   x Slight increased resistance today with no complaints   Supine PROM to left shoulder in all planes to patient tolerance   x    Cross body posterior capsule stretch  15 sec 5     IR towel stretch behind the back 10 sec 5 x    Supine AROM flexion to 90 degrees, sidelying ER with elbow at side, sidelying abduction to 90 degrees 1 10 ea x Good strength noted with no complaints   Supine scapular punch, circles Cw and Ccw 1 10 ea x    Supine LUE propped on towel roll 1# bicep curl  1 15 x    Activities Time    Notes/Comments   Education for passive dressing and bathing      Adjusted sling for increased comfort                Specific Interventions Next Treatment: follow protocol for massive RCR, BT, no resisitve elbow flexion x 6 weeks    Activity/Treatment Tolerance:  [x]  Patient tolerated treatment well  []  Patient limited by fatigue  []  Patient limited by pain   []  Patient limited by other medical complications  []  Other:     Assessment: Patient is progressing towards goals.  No complaints with small strengthening advancements today. GOALS:  Patient Goal: return to previous functional level, return to part time jobs     Short Term Goals:  Time Frame: 4 weeks   Patient will increase left shoulder  passive flexion to 155 and ER at side to 55 to increase flexibility to allow patient to complete daily tasks. GOAL MET - SEE ABOVE FOR DETAILS - REVISED GOAL: Increase passive left shoulder flexion to 165, abduction to 125, ER at 90 to 55, and IR to 65 to increase flexibility to allow patient to complete his premorbid work and daily tasks. Report no more than 1/10 in left shoulder with HEP upgrades to increase his ability to use left arm in daily activities. GOAL NOT MET - RELATES THAT PAIN GOES TO 4/10 WITH STRETCHING - CONTINUE GOAL  Patient will be independent with HEP to increase ability for eventual return to work. GOAL MET - CONTINUE GOAL WITH HEP UPGRADES  GOAL INITIATED: Increase active left shoulder flexion to 145, abduction to 150, ER at 90 to 70, and get left thumb 4\" above waistband behind back to increase his ability to reach overhead for all normal activities. 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 GOAL MET - REVISED GOAL: Be able to drive standard transmission vehicle without difficulty. Patient will reach into upper cupboard to gather dishes for eating without difficulty. 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 3.  Be able to use his left arm to turn steering wheel without pain in left shoulder. GOAL MET - REVISED GOAL: Be able to use left arm to move heavy grocery bags from cart to car without difficulty. Patient Education:   []  HEP/Education Completed: goal status    [x]  No new Education completed  []  Reviewed Prior HEP      [x]  Patient verbalized and/or demonstrated understanding of education provided.   []  Patient unable to verbalize and/or demonstrate understanding of education provided. Will continue education. [x]  Barriers to learning: none    PLAN:      []  Plan of care initiated. Plan to see patient 2 times per week for 12 weeks to address the treatment planned outlined above.   [x]  Continue with current plan of care  []  Modify plan of care as follows: see patient 2 x week x 12 weeks from 12/8/22   []  Hold pending physician visit  []  Discharge    Time In 26 [id number]   Time Out 0988   Timed Code Minutes: 30 min   Total Treatment Time: 30 min       MARKO WETZEL/ANTON #[id number]

## 2022-12-19 ENCOUNTER — HOSPITAL ENCOUNTER (OUTPATIENT)
Dept: OCCUPATIONAL THERAPY | Age: 72
Setting detail: THERAPIES SERIES
Discharge: HOME | End: 2022-12-19
Payer: MEDICARE

## 2022-12-19 PROCEDURE — 97110 THERAPEUTIC EXERCISES: CPT

## 2022-12-19 NOTE — PROGRESS NOTES
[address]  [] EVALUATION  [x] DAILY NOTE (LAND) [] DAILY NOTE (AQUATIC )   [] PROGRESS NOTE [] DISCHARGE NOTE    [] [address] [facility]   [x] [address]    [] [facility]   [] Jackie Bell    Date: 2022  Patient Name:  Alina Zaidi  : 1950  MRN: [id number]  CSN: [id number]    Referring Practitioner Yen Maki MD   Diagnosis Complete rotator cuff tear or rupture of left shoulder, not specified as traumatic [M75.122]  Superior glenoid labrum lesion of left shoulder, initial encounter [W30.476K]    Treatment Diagnosis Decreased ROM L UE, increased pain L UE, decreased ADLs   Date of Evaluation 10/3/22      Functional Outcome Measure Used UEFS   Functional Outcome Score  (10/3/22)       Insurance: Primary: Payor: MEDICARE /  /  / ,   Secondary: BC   Authorization Information: PRECERTIFICATION REQUIRED:  No  INSURANCE THERAPY BENEFIT:  Patient has unlimited visits based on medical necessity  Benefit will not cover maintenance or preventative treatment. AQUATIC THERAPY COVERED:   Yes  MODALITIES COVERED:  Yes, with the exception of iontophoresis and hot packs/cold pack  TELEHEALTH COVERED: Yes   Visit # 22, 3/10 for PN; PN completed on 22   Visits Allowed: Unlimited   Recertification Date: 79   Physician Follow-Up: 22   Physician Orders: Follow protocol for massive RCR, BT, no resistive elbow flexion x 6 weeks   Pertinent History:  Patient reports that his shoulder gave out in 2022. Patient underwent surgery on 22 for arthroscopic RCR, BT, SAD, DCR, ED.      has a past medical history of Chondromalacia of right knee, Hypertension, and Legionnaire's disease ([facility] [address] SUBJECTIVE:   Patient states that he is having some numbness in his left hand and he is sure that this is from bone spurs in his left elbow.     TREATMENT   Precautions: Goes by March Current, surgery date 22, follow protocol for massive RCR, BT, no resistive elbow flexion x 6 weeks   Pain:  No pain at time of therapy LOCATION: left upper arm along bicep    X in shaded column indicates Activity Completed Today   Modalities Parameters/  Location  Notes/Comments                     Manual Therapy Time/  Technique  Notes/Comments   STM to left upper trapezius region      STM to left upper trap, bicep, and deltoid            Exercises   Sets/  Sec Reps  Notes/Comments   Pulleys for shoulder flexion 1 15  Warm up   Scapular retraction 1 15     Pendulums with left UE - all 3 directions 1 10 each     Supine ER with dowel within 40 degree limitation 1 15     Submaximal isometrics with left UE - shoulder flexion, extension, abduction, IR, and ER 1 10 each  5 second hold   Supine dowel for shoulder flexion and chest press 1 10 each     Biodex at 70 speed x 3 minutes forward and 3 minutes backward   x    Supine PROM to left shoulder in all planes to patient tolerance       Pulleys for shoulder flexion 1 15 x    IR towel stretch behind the back 10 sec 5     Supine AROM flexion to 90 degrees, sidelying ER with elbow at side, sidelying abduction to 90 degrees 1 10 ea  Good strength noted with no complaints   Supine scapular punch, circles Cw and Ccw 1 10 ea     Reaching endurance activity - used left UE to place clothespins on and off vertical post 1 1 cycle x    Fisher theraband - yusra with left UE 1 10 each x    Orange theraband - standing        Supine LUE propped on towel roll 1# bicep curl  1 15     Activities Time    Notes/Comments   Education for passive dressing and bathing      Adjusted sling for increased comfort                Specific Interventions Next Treatment: follow protocol for massive RCR, BT, no resisitve elbow flexion x 6 weeks    Activity/Treatment Tolerance:  [x]  Patient tolerated treatment well  []  Patient limited by fatigue  []  Patient limited by pain   []  Patient limited by other medical complications  []  Other:     Assessment: Patient is progressing toward goals nicely. Did demonstrate fatigue in left shoulder along with weakness, but was able to tolerate light strengthening this date per protocol. GOALS:  Patient Goal: return to previous functional level, return to part time jobs     Short Term Goals:  Time Frame: 4 weeks    Increase passive left shoulder flexion to 165, abduction to 125, ER at 90 to 55, and IR to 65 to increase flexibility to allow patient to complete his premorbid work and daily tasks. Report no more than 1/10 in left shoulder with HEP upgrades to increase his ability to use left arm in daily activities. Patient will be independent with HEP to increase ability for eventual return to work. Increase active left shoulder flexion to 145, abduction to 150, ER at 90 to 70, and get left thumb 4\" above waistband behind back to increase his ability to reach overhead for all normal activities. Long Term Goals:  Time Frame: 12 weeks    Be able to drive standard transmission vehicle without difficulty. 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 3.   Be able to use left arm to move heavy grocery bags from cart to car without difficulty. Patient Education:   [x]  HEP/Education Completed: orange theraband - yusra and standing riivalid    []  No new Education completed  []  Reviewed Prior HEP      [x]  Patient verbalized and/or demonstrated understanding of education provided. []  Patient unable to verbalize and/or demonstrate understanding of education provided. Will continue education. [x]  Barriers to learning: none    PLAN:      []  Plan of care initiated. Plan to see patient 2 times per week for 12 weeks to address the treatment planned outlined above.   [x]  Continue with current plan of care  []  Modify plan of care as follows: see patient 2 x week x 12 weeks from 12/8/22   []  Hold pending physician visit  []  Discharge    Time In 1000   Time Out 1025   Timed Code Minutes: 25 min   Total Treatment Time: 25 min       Angel Triana OTR/ANTON #[id number]

## 2022-12-22 ENCOUNTER — HOSPITAL ENCOUNTER (OUTPATIENT)
Dept: OCCUPATIONAL THERAPY | Age: 72
Setting detail: THERAPIES SERIES
Discharge: HOME | End: 2022-12-22
Payer: MEDICARE

## 2022-12-22 PROCEDURE — 97110 THERAPEUTIC EXERCISES: CPT

## 2022-12-22 NOTE — PROGRESS NOTES
[address]  [] EVALUATION  [x] DAILY NOTE (LAND) [] DAILY NOTE (AQUATIC )   [] PROGRESS NOTE [] DISCHARGE NOTE    [] [address] [facility]   [x] [address]    [] [facility]   [] Maggie Tuttle    Date: 2022  Patient Name:  Gil Gonzalez  : 1950  MRN: [id number]  CSN: [id number]    Referring Practitioner Latonia Mazariegos MD   Diagnosis Complete rotator cuff tear or rupture of left shoulder, not specified as traumatic [M75.122]  Superior glenoid labrum lesion of left shoulder, initial encounter [I23.593T]    Treatment Diagnosis Decreased ROM L UE, increased pain L UE, decreased ADLs   Date of Evaluation 10/3/22      Functional Outcome Measure Used UEFS   Functional Outcome Score  (10/3/22)       Insurance: Primary: Payor: MEDICARE /  /  / ,   Secondary: BC   Authorization Information: PRECERTIFICATION REQUIRED:  No  INSURANCE THERAPY BENEFIT:  Patient has unlimited visits based on medical necessity  Benefit will not cover maintenance or preventative treatment. AQUATIC THERAPY COVERED:   Yes  MODALITIES COVERED:  Yes, with the exception of iontophoresis and hot packs/cold pack  TELEHEALTH COVERED: Yes   Visit # 23, 4/10 for PN; PN completed on 22   Visits Allowed: Unlimited   Recertification Date:    Physician Follow-Up: 3/10/23   Physician Orders: Follow protocol   Pertinent History:  Patient reports that his shoulder gave out in 2022. Patient underwent surgery on 22 for arthroscopic RCR, BT, SAD, DCR, ED.      has a past medical history of Chondromalacia of right knee, Hypertension, and Legionnaire's disease ([facility] [address] SUBJECTIVE:   States that he saw CNP this morning and they were pleased with progress. Ordered more therapy for strengthening. States that he was told that he could start doing some lifting but nothing overhead.      TREATMENT   Precautions: Goes by Brian Thompson, surgery date 9/28/22, follow protocol for massive RCR, BT, no resistive elbow flexion x 6 weeks   Pain:  No pain at time of therapy LOCATION: left upper arm along bicep    X in shaded column indicates Activity Completed Today   Modalities Parameters/  Location  Notes/Comments                     Manual Therapy Time/  Technique  Notes/Comments   STM to left upper trapezius region      STM to left upper trap, bicep, and deltoid            Exercises   Sets/  Sec Reps  Notes/Comments   Pulleys for shoulder flexion 1 15  Warm up   Scapular retraction 1 15     Pendulums with left UE - all 3 directions 1 10 each     Supine ER with dowel within 40 degree limitation 1 15     Submaximal isometrics with left UE - shoulder flexion, extension, abduction, IR, and ER 1 10 each  5 second hold   Supine dowel for shoulder flexion and chest press 1 10 each     Biodex at 60 speed x 3 minutes forward and 3 minutes backward   x    Supine PROM to left shoulder in all planes to patient tolerance   x    Pulleys for shoulder flexion 1 15 x    IR towel stretch behind the back 10 sec 5     Supine AROM flexion to 90 degrees, sidelying ER with elbow at side, sidelying abduction to 90 degrees 1 10 ea  Good strength noted with no complaints   Supine scapular punch, circles Cw and Ccw 1 10 ea     Reaching endurance activity - used left UE to place clothespins on and off vertical post 1 1 cycle x    Amite theraband - yusra with left UE 1 10 each x    Orange theraband - standing riivalid 1 10 each x    Supine LUE propped on towel roll 1# bicep curl  1 15     Activities Time    Notes/Comments   Education for passive dressing and bathing      Adjusted sling for increased comfort                Specific Interventions Next Treatment: follow protocol for massive RCR, BT, no resisitve elbow flexion x 6 weeks    Activity/Treatment Tolerance:  [x]  Patient tolerated treatment well  []  Patient limited by fatigue  []  Patient limited by pain   []  Patient limited by other medical complications  []  Other:     Assessment: Patient is progressing toward goals. Tolerating gentle strengthening well. GOALS:  Patient Goal: return to previous functional level, return to part time jobs     Short Term Goals:  Time Frame: 4 weeks    Increase passive left shoulder flexion to 165, abduction to 125, ER at 90 to 55, and IR to 65 to increase flexibility to allow patient to complete his premorbid work and daily tasks. Report no more than 1/10 in left shoulder with HEP upgrades to increase his ability to use left arm in daily activities. Patient will be independent with HEP to increase ability for eventual return to work. Increase active left shoulder flexion to 145, abduction to 150, ER at 90 to 70, and get left thumb 4\" above waistband behind back to increase his ability to reach overhead for all normal activities. Long Term Goals:  Time Frame: 12 weeks    Be able to drive standard transmission vehicle without difficulty. 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 3.   Be able to use left arm to move heavy grocery bags from cart to car without difficulty. Patient Education:   []  HEP/Education Completed:     [x]  No new Education completed  []  Reviewed Prior HEP      [x]  Patient verbalized and/or demonstrated understanding of education provided. []  Patient unable to verbalize and/or demonstrate understanding of education provided. Will continue education. [x]  Barriers to learning: none    PLAN:      []  Plan of care initiated. Plan to see patient 2 times per week for 12 weeks to address the treatment planned outlined above.   [x]  Continue with current plan of care  []  Modify plan of care as follows: see patient 2 x week x 12 weeks from 12/8/22   []  Hold pending physician visit  []  Discharge    Time In 1445   Time Out 1515   Timed Code Minutes: 30 min   Total Treatment Time: 30 min       Beth Moreno OTR/L #[id number]

## 2022-12-27 ENCOUNTER — HOSPITAL ENCOUNTER (OUTPATIENT)
Dept: OCCUPATIONAL THERAPY | Age: 72
Setting detail: THERAPIES SERIES
Discharge: HOME | End: 2022-12-27
Payer: MEDICARE

## 2022-12-27 PROCEDURE — 97110 THERAPEUTIC EXERCISES: CPT

## 2022-12-27 PROCEDURE — 97140 MANUAL THERAPY 1/> REGIONS: CPT

## 2022-12-27 NOTE — PROGRESS NOTES
[address]  [] EVALUATION  [x] DAILY NOTE (LAND) [] DAILY NOTE (AQUATIC )   [] PROGRESS NOTE [] DISCHARGE NOTE    [] [address] [facility]   [x] [address]    [] [facility]   [] Linda Encinas    Date: 2022  Patient Name:  Beto Marquez  : 1950  MRN: [id number]  CSN: [id number]    Referring Practitioner Vaughn Nguyễn MD   Diagnosis Complete rotator cuff tear or rupture of left shoulder, not specified as traumatic [M75.122]  Superior glenoid labrum lesion of left shoulder, initial encounter [B08.921T]    Treatment Diagnosis Decreased ROM L UE, increased pain L UE, decreased ADLs   Date of Evaluation 10/3/22      Functional Outcome Measure Used UEFS   Functional Outcome Score  (10/3/22)       Insurance: Primary: Payor: MEDICARE /  /  / ,   Secondary: BCBS   Authorization Information: PRECERTIFICATION REQUIRED:  No  INSURANCE THERAPY BENEFIT:  Patient has unlimited visits based on medical necessity  Benefit will not cover maintenance or preventative treatment. AQUATIC THERAPY COVERED:   Yes  MODALITIES COVERED:  Yes, with the exception of iontophoresis and hot packs/cold pack  TELEHEALTH COVERED: Yes   Visit # 24, 5/10 for PN; PN completed on 22   Visits Allowed: Unlimited   Recertification Date: 91   Physician Follow-Up: 3/10/23   Physician Orders: Follow protocol   Pertinent History:  Patient reports that his shoulder gave out in 2022. Patient underwent surgery on 22 for arthroscopic RCR, BT, SAD, DCR, ED.      has a past medical history of Chondromalacia of right knee, Hypertension, and Legionnaire's disease ([facility] [address] SUBJECTIVE:   Patient reports he can see improvement with household tasks, reference \"I am pretty close to back to normal\".  Patient reports he is still trying to watch his overhead lifting and reaching tasks, heavier objects, secondary to the MD. Patient reports his wife wants some painting done and told her he is not able to complete. Patient reports he feels like he is back to 98% of PLOF with household tasks.      TREATMENT   Precautions: Goes by Dolores Rocha, surgery date 9/28/22, follow protocol for massive RCR, BT, no resistive elbow flexion x 6 weeks   Pain:  No pain at time of therapy LOCATION: left upper arm along bicep    X in shaded column indicates Activity Completed Today   Modalities Parameters/  Location  Notes/Comments                     Manual Therapy Time/  Technique  Notes/Comments   STM to left upper trapezius region      STM to left upper trap, bicep, and deltoid            Exercises   Sets/  Sec Reps  Notes/Comments   Pulleys for shoulder flexion 1 15  Warm up   Scapular retraction 1 15     Pendulums with left UE - all 3 directions 1 10 each     Supine ER with dowel within 40 degree limitation 1 15     Submaximal isometrics with left UE - shoulder flexion, extension, abduction, IR, and ER 1 10 each  5 second hold   Supine dowel for shoulder flexion and chest press 1 10 each     Biodex at 60 speed x 3 minutes forward and 3 minutes backward   x Cool down this date    Supine PROM to left shoulder in all planes to patient tolerance   x    Pulleys for shoulder flexion 1 15 x Warm up this date    IR towel stretch behind the back 10 sec 5     Supine AROM flexion to 90 degrees, sidelying ER with elbow at side, sidelying abduction to 90 degrees 1 10 ea  Good strength noted with no complaints   Supine scapular punch, circles Cw and Ccw, supine flexion 90 to tolerance- hand in neutral  1 10 ea x Trial with 1# this date, good tolerance throughout    Reaching endurance activity - used left UE to place clothespins on and off vertical post 1 1 cycle     Orange theraband - yusra with left UE 1 10 each x    Orange theraband - standing riivalid 1 10 each x    Supine LUE propped on towel roll 1# bicep curl  1 15     Activities Time    Notes/Comments   Education for passive dressing and bathing      Adjusted sling for increased comfort                Specific Interventions Next Treatment: follow protocol for massive RCR, BT, no resisitve elbow flexion x 6 weeks    Activity/Treatment Tolerance:  [x]  Patient tolerated treatment well  []  Patient limited by fatigue  []  Patient limited by pain   []  Patient limited by other medical complications  []  Other:     Assessment: Patient is progressing toward goals. Overall good tolerance with continued and existing strengthening this date noted above. Good technique with Maura. GOALS:  Patient Goal: return to previous functional level, return to part time jobs     Short Term Goals:  Time Frame: 4 weeks    Increase passive left shoulder flexion to 165, abduction to 125, ER at 90 to 55, and IR to 65 to increase flexibility to allow patient to complete his premorbid work and daily tasks. Report no more than 1/10 in left shoulder with HEP upgrades to increase his ability to use left arm in daily activities. Patient will be independent with HEP to increase ability for eventual return to work. Increase active left shoulder flexion to 145, abduction to 150, ER at 90 to 70, and get left thumb 4\" above waistband behind back to increase his ability to reach overhead for all normal activities. Long Term Goals:  Time Frame: 12 weeks    Be able to drive standard transmission vehicle without difficulty. 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 3.   Be able to use left arm to move heavy grocery bags from cart to car without difficulty. Patient Education:   []  HEP/Education Completed:     []  No new Education completed  [x]  Reviewed Prior HEP      [x]  Patient verbalized and/or demonstrated understanding of education provided. []  Patient unable to verbalize and/or demonstrate understanding of education provided. Will continue education.   [x]  Barriers to learning: none    PLAN:      []  Plan of care initiated. Plan to see patient 2 times per week for 12 weeks to address the treatment planned outlined above.   [x]  Continue with current plan of care  []  Modify plan of care as follows: see patient 2 x week x 12 weeks from 12/8/22   []  Hold pending physician visit  []  Discharge    Time In 0830   Time Out 0859   Timed Code Minutes: 29 min   Total Treatment Time: 29 min       Jacklyn RODRIGUEZ #[id number]

## 2022-12-29 ENCOUNTER — HOSPITAL ENCOUNTER (OUTPATIENT)
Dept: OCCUPATIONAL THERAPY | Age: 72
Setting detail: THERAPIES SERIES
Discharge: HOME | End: 2022-12-29
Payer: MEDICARE

## 2022-12-29 PROCEDURE — 97110 THERAPEUTIC EXERCISES: CPT

## 2022-12-29 NOTE — PROGRESS NOTES
[address]  [] EVALUATION  [x] DAILY NOTE (LAND) [] DAILY NOTE (AQUATIC )   [] PROGRESS NOTE [] DISCHARGE NOTE    [] [address] [facility]   [x] [address]    [] [facility]   [] Jackie Bell    Date: 2022  Patient Name:  Alina Zaidi  : 1950  MRN: [id number]  CSN: [id number]    Referring Practitioner Yen Maki MD   Diagnosis Complete rotator cuff tear or rupture of left shoulder, not specified as traumatic [M75.122]  Superior glenoid labrum lesion of left shoulder, initial encounter [M28.354J]    Treatment Diagnosis Decreased ROM L UE, increased pain L UE, decreased ADLs   Date of Evaluation 10/3/22      Functional Outcome Measure Used UEFS   Functional Outcome Score  (10/3/22)       Insurance: Primary: Payor: MEDICARE /  /  / ,   Secondary: BCBS   Authorization Information: PRECERTIFICATION REQUIRED:  No  INSURANCE THERAPY BENEFIT:  Patient has unlimited visits based on medical necessity  Benefit will not cover maintenance or preventative treatment. AQUATIC THERAPY COVERED:   Yes  MODALITIES COVERED:  Yes, with the exception of iontophoresis and hot packs/cold pack  TELEHEALTH COVERED: Yes   Visit # 25, 6/10 for PN; PN completed on 22   Visits Allowed: Unlimited   Recertification Date: 4/3/04   Physician Follow-Up: 3/10/23   Physician Orders: Follow protocol   Pertinent History:  Patient reports that his shoulder gave out in 2022. Patient underwent surgery on 22 for arthroscopic RCR, BT, SAD, DCR, ED.      has a past medical history of Chondromalacia of right knee, Hypertension, and Legionnaire's disease ([facility] [address] SUBJECTIVE:    Patient states that once in a while he gets a pain when he puts on a coat. States that he also gets an occasional pain in upper arm which happens at random times.      TREATMENT   Precautions: Goes by March Current, surgery date 22, follow protocol for massive RCR, BT, no resistive elbow flexion x 6 weeks   Pain:  No pain at time of therapy LOCATION: left upper arm along bicep    X in shaded column indicates Activity Completed Today   Modalities Parameters/  Location  Notes/Comments                     Manual Therapy Time/  Technique  Notes/Comments   STM to left upper trapezius region      STM to left upper trap, bicep, and deltoid            Exercises   Sets/  Sec Reps  Notes/Comments   Pulleys for shoulder flexion 1 15  Warm up   Scapular retraction 1 15     Pendulums with left UE - all 3 directions 1 10 each     Supine ER with dowel within 40 degree limitation 1 15     Submaximal isometrics with left UE - shoulder flexion, extension, abduction, IR, and ER 1 10 each  5 second hold   Supine dowel for shoulder flexion and chest press 1 10 each     Biodex at 60 speed x 3 minutes forward and 3 minutes backward   x Cool down this date    Supine PROM to left shoulder in all planes to patient tolerance       Wall slide for left shoulder flexion and then removing hand from wall 1 10 x 5 second hold   Pulleys for shoulder flexion 1 15 x Warm up this date    IR towel stretch behind the back 10 sec 5     Supine AROM flexion to 90 degrees, sidelying ER with elbow at side, sidelying abduction to 90 degrees 1 10 ea  Good strength noted with no complaints   Supine scapular punch, circles Cw and Ccw, supine flexion 90 to tolerance- hand in neutral  1 10 ea  Trial with 1# this date, good tolerance throughout    Reaching endurance activity - used left UE to place clothespins on and off vertical post 1 2 cycles x    green theraband - yusra with left UE 1 10 each x    green theraband - standing riivalid 1 10 each x    Supine LUE propped on towel roll 1# bicep curl  1 15     Activities Time    Notes/Comments   Education for passive dressing and bathing      Adjusted sling for increased comfort                Specific Interventions Next Treatment: follow protocol for massive RCR, BT, no resisitve elbow flexion x 6 weeks    Activity/Treatment Tolerance:  [x]  Patient tolerated treatment well  []  Patient limited by fatigue  []  Patient limited by pain   []  Patient limited by other medical complications  []  Other:     Assessment: Patient is progressing toward goals. Tolerated the increase in strengthening today well. GOALS:  Patient Goal: return to previous functional level, return to part time jobs     Short Term Goals:  Time Frame: 4 weeks    Increase passive left shoulder flexion to 165, abduction to 125, ER at 90 to 55, and IR to 65 to increase flexibility to allow patient to complete his premorbid work and daily tasks. Report no more than 1/10 in left shoulder with HEP upgrades to increase his ability to use left arm in daily activities. Patient will be independent with HEP to increase ability for eventual return to work. Increase active left shoulder flexion to 145, abduction to 150, ER at 90 to 70, and get left thumb 4\" above waistband behind back to increase his ability to reach overhead for all normal activities. Long Term Goals:  Time Frame: 12 weeks    Be able to drive standard transmission vehicle without difficulty. 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 3.   Be able to use left arm to move heavy grocery bags from cart to car without difficulty. Patient Education:   [x]  HEP/Education Completed: to use green theraband for home; wall slide with removal of hand from wall    []  No new Education completed  []  Reviewed Prior HEP      [x]  Patient verbalized and/or demonstrated understanding of education provided. []  Patient unable to verbalize and/or demonstrate understanding of education provided. Will continue education. [x]  Barriers to learning: none    PLAN:      []  Plan of care initiated. Plan to see patient 2 times per week for 12 weeks to address the treatment planned outlined above.   [x]  Continue with current plan of care  []  Modify plan of care as follows: see patient 2 x week x 12 weeks from 12/8/22   []  Hold pending physician visit  []  Discharge    Time In 0830   Time Out 0900   Timed Code Minutes: 30 min   Total Treatment Time: 30 min       Beth Moreno, OTR/L #[id number]

## 2023-01-03 ENCOUNTER — OFFICE VISIT (OUTPATIENT)
Dept: FAMILY MEDICINE CLINIC | Age: 73
End: 2023-01-03
Payer: COMMERCIAL

## 2023-01-03 VITALS
RESPIRATION RATE: 16 BRPM | HEART RATE: 86 BPM | SYSTOLIC BLOOD PRESSURE: 126 MMHG | WEIGHT: 237.5 LBS | BODY MASS INDEX: 33.25 KG/M2 | HEIGHT: 71 IN | DIASTOLIC BLOOD PRESSURE: 78 MMHG

## 2023-01-03 DIAGNOSIS — Z12.5 SCREENING FOR PROSTATE CANCER: ICD-10-CM

## 2023-01-03 DIAGNOSIS — M10.00 IDIOPATHIC GOUT, UNSPECIFIED CHRONICITY, UNSPECIFIED SITE: ICD-10-CM

## 2023-01-03 DIAGNOSIS — E66.9 OBESITY WITH BODY MASS INDEX GREATER THAN 30: ICD-10-CM

## 2023-01-03 DIAGNOSIS — I10 HYPERTENSION, UNSPECIFIED TYPE: ICD-10-CM

## 2023-01-03 DIAGNOSIS — E78.00 PURE HYPERCHOLESTEROLEMIA: ICD-10-CM

## 2023-01-03 DIAGNOSIS — Z00.00 MEDICARE ANNUAL WELLNESS VISIT, SUBSEQUENT: Primary | ICD-10-CM

## 2023-01-03 DIAGNOSIS — R73.01 IFG (IMPAIRED FASTING GLUCOSE): ICD-10-CM

## 2023-01-03 PROCEDURE — 1123F ACP DISCUSS/DSCN MKR DOCD: CPT | Performed by: FAMILY MEDICINE

## 2023-01-03 PROCEDURE — G8484 FLU IMMUNIZE NO ADMIN: HCPCS | Performed by: FAMILY MEDICINE

## 2023-01-03 PROCEDURE — 3078F DIAST BP <80 MM HG: CPT | Performed by: FAMILY MEDICINE

## 2023-01-03 PROCEDURE — 3017F COLORECTAL CA SCREEN DOC REV: CPT | Performed by: FAMILY MEDICINE

## 2023-01-03 PROCEDURE — 3074F SYST BP LT 130 MM HG: CPT | Performed by: FAMILY MEDICINE

## 2023-01-03 PROCEDURE — G0439 PPPS, SUBSEQ VISIT: HCPCS | Performed by: FAMILY MEDICINE

## 2023-01-03 RX ORDER — ALLOPURINOL 300 MG/1
TABLET ORAL
Qty: 90 TABLET | Refills: 3 | Status: SHIPPED | OUTPATIENT
Start: 2023-01-03

## 2023-01-03 RX ORDER — METOPROLOL SUCCINATE 25 MG/1
TABLET, EXTENDED RELEASE ORAL
Qty: 90 TABLET | Refills: 3 | Status: SHIPPED | OUTPATIENT
Start: 2023-01-03

## 2023-01-03 RX ORDER — IRBESARTAN AND HYDROCHLOROTHIAZIDE 150; 12.5 MG/1; MG/1
TABLET, FILM COATED ORAL
Qty: 90 TABLET | Refills: 3 | Status: SHIPPED | OUTPATIENT
Start: 2023-01-03

## 2023-01-03 SDOH — ECONOMIC STABILITY: FOOD INSECURITY: WITHIN THE PAST 12 MONTHS, YOU WORRIED THAT YOUR FOOD WOULD RUN OUT BEFORE YOU GOT MONEY TO BUY MORE.: NEVER TRUE

## 2023-01-03 SDOH — ECONOMIC STABILITY: FOOD INSECURITY: WITHIN THE PAST 12 MONTHS, THE FOOD YOU BOUGHT JUST DIDN'T LAST AND YOU DIDN'T HAVE MONEY TO GET MORE.: NEVER TRUE

## 2023-01-03 ASSESSMENT — LIFESTYLE VARIABLES
HOW MANY STANDARD DRINKS CONTAINING ALCOHOL DO YOU HAVE ON A TYPICAL DAY: PATIENT DOES NOT DRINK
HOW OFTEN DO YOU HAVE A DRINK CONTAINING ALCOHOL: NEVER

## 2023-01-03 ASSESSMENT — PATIENT HEALTH QUESTIONNAIRE - PHQ9
SUM OF ALL RESPONSES TO PHQ QUESTIONS 1-9: 0
2. FEELING DOWN, DEPRESSED OR HOPELESS: 0
SUM OF ALL RESPONSES TO PHQ QUESTIONS 1-9: 0
SUM OF ALL RESPONSES TO PHQ QUESTIONS 1-9: 0
1. LITTLE INTEREST OR PLEASURE IN DOING THINGS: 0
SUM OF ALL RESPONSES TO PHQ9 QUESTIONS 1 & 2: 0
SUM OF ALL RESPONSES TO PHQ QUESTIONS 1-9: 0

## 2023-01-03 ASSESSMENT — SOCIAL DETERMINANTS OF HEALTH (SDOH): HOW HARD IS IT FOR YOU TO PAY FOR THE VERY BASICS LIKE FOOD, HOUSING, MEDICAL CARE, AND HEATING?: NOT HARD AT ALL

## 2023-01-03 ASSESSMENT — ENCOUNTER SYMPTOMS
RESPIRATORY NEGATIVE: 1
GASTROINTESTINAL NEGATIVE: 1

## 2023-01-03 NOTE — PATIENT INSTRUCTIONS
You may receive a survey regarding the care you received during your visit. Your input is valuable to us. We encourage you to complete and return your survey. We hope you will choose us in the future for your healthcare needs. Learning About Vision Tests  What are vision tests? The four most common vision tests are visual acuity tests, refraction, visual field tests, and color vision tests. Visual acuity (sharpness) tests  These tests are used: To see if you need glasses or contact lenses. To monitor an eye problem. To check an eye injury. Visual acuity tests are done as part of routine exams. You may also have this test when you get your 's license or apply for some types of jobs. Visual field tests  These tests are used: To check for vision loss in any area of your range of vision. To screen for certain eye diseases. To look for nerve damage after a stroke, head injury, or other problem that could reduce blood flow to the brain. Refraction and color tests  A refraction test is done to find the right prescription for glasses and contact lenses. A color vision test is done to check for color blindness. Color vision is often tested as part of a routine exam. You may also have this test when you apply for a job where recognizing different colors is important, such as , electronics, or the [employer]. How are vision tests done? Visual acuity test   You cover one eye at a time. You read aloud from a wall chart across the room. You read aloud from a small card that you hold in your hand. Refraction   You look into a special device. The device puts lenses of different strengths in front of each eye to see how strong your glasses or contact lenses need to be. Visual field tests   Your doctor may have you look through special machines. Or your doctor may simply have you stare straight ahead while they move a finger into and out of your field of vision.   Color vision test You look at pieces of printed test patterns in various colors. You say what number or symbol you see. Your doctor may have you trace the number or symbol using a pointer. How do these tests feel? There is very little chance of having a problem from this test. If dilating drops are used for a vision test, they may make the eyes sting and cause a medicine taste in the mouth. Follow-up care is a key part of your treatment and safety. Be sure to make and go to all appointments, and call your doctor if you are having problems. It's also a good idea to know your test results and keep a list of the medicines you take. Where can you learn more? Go to http://www.davis.com/ and enter G551 to learn more about \"Learning About Vision Tests. \"  Current as of: October 12, 2022               Content Version: 13.5  © 8780-0649 [employer]. Care instructions adapted under license by [facility] ([facility]). If you have questions about a medical condition or this instruction, always ask your healthcare professional. [place] [address] any warranty or liability for your use of this information. Advance Directives: Care Instructions  Overview  An advance directive is a legal way to state your wishes at the end of your life. It tells your family and your doctor what to do if you can't say what you want. There are two main types of advance directives. You can change them any time your wishes change. Living will. This form tells your family and your doctor your wishes about life support and other treatment. The form is also called a declaration. Medical power of . This form lets you name a person to make treatment decisions for you when you can't speak for yourself. This person is called a health care agent (health care proxy, health care surrogate). The form is also called a durable power of  for health care.   If you do not have an advance directive, decisions about your medical care may be made by a family member, or by a doctor or a  who doesn't know you. It may help to think of an advance directive as a gift to the people who care for you. If you have one, they won't have to make tough decisions by themselves. For more information, including forms for your state, see the [address] website (www.caringinfo.org/planning/advance-directives/). Follow-up care is a key part of your treatment and safety. Be sure to make and go to all appointments, and call your doctor if you are having problems. It's also a good idea to know your test results and keep a list of the medicines you take. What should you include in an advance directive? Many states have a unique advance directive form. (It may ask you to address specific issues.) Or you might use a universal form that's approved by many states. If your form doesn't tell you what to address, it may be hard to know what to include in your advance directive. Use the questions below to help you get started. Who do you want to make decisions about your medical care if you are not able to? What life-support measures do you want if you have a serious illness that gets worse over time or can't be cured? What are you most afraid of that might happen? (Maybe you're afraid of having pain, losing your independence, or being kept alive by machines.)  Where would you prefer to die? (Your home? A hospital? A nursing home?)  Do you want to donate your organs when you die? Do you want certain Restorationist practices performed before you die? When should you call for help? Be sure to contact your doctor if you have any questions. Where can you learn more? Go to http://www.davis.com/ and enter R264 to learn more about \"Advance Directives: Care Instructions. \"  Current as of: June 16, 2022               Content Version: 13.5  © 0224-9209 [employer]. Care instructions adapted under license by [facility] ([facility]).  If you have questions about a medical condition or this instruction, always ask your healthcare professional. [place] [address] any warranty or liability for your use of this information. Personalized Preventive Plan for Jocelynn Crook - 1/3/2023  Medicare offers a range of preventive health benefits. Some of the tests and screenings are paid in full while other may be subject to a deductible, co-insurance, and/or copay. Some of these benefits include a comprehensive review of your medical history including lifestyle, illnesses that may run in your family, and various assessments and screenings as appropriate. After reviewing your medical record and screening and assessments performed today your provider may have ordered immunizations, labs, imaging, and/or referrals for you. A list of these orders (if applicable) as well as your Preventive Care list are included within your After Visit Summary for your review. Other Preventive Recommendations:    A preventive eye exam performed by an eye specialist is recommended every 1-2 years to screen for glaucoma; cataracts, macular degeneration, and other eye disorders. A preventive dental visit is recommended every 6 months. Try to get at least 150 minutes of exercise per week or 10,000 steps per day on a pedometer . Order or download the FREE \"Exercise & Physical Activity: Your Everyday Guide\" from [employer]. Call 6-504.899.9536 or search [employer] online. You need 8299-8063 mg of calcium and 9647-6281 IU of vitamin D per day. It is possible to meet your calcium requirement with diet alone, but a vitamin D supplement is usually necessary to meet this goal.  When exposed to the sun, use a sunscreen that protects against both UVA and UVB radiation with an SPF of 30 or greater. Reapply every 2 to 3 hours or after sweating, drying off with a towel, or swimming.   Always wear a seat belt when traveling in a car. Always wear a helmet when riding a bicycle or motorcycle.

## 2023-01-03 NOTE — PROGRESS NOTES
1/3/2023    Laurel Styles (:  1950) is a 81 y.o. male, here for a preventive medicine evaluation. Chief Complaint   Patient presents with    Medicare AWV     AWV. BPs ok. BP Readings from Last 3 Encounters:   23 126/78   19 120/78   19 116/74     Weight is up. Wt Readings from Last 3 Encounters:   23 237 lb 8 oz (107.7 kg)   19 221 lb 12.8 oz (100.6 kg)   19 220 lb 9.6 oz (100.1 kg)     No acute concerns. Patient Active Problem List   Diagnosis    HTN (hypertension)    Gout    Legionella pneumonia (HCC)    Chondromalacia of right knee    Derangement of posterior horn of medial meniscus of right knee    Derangement of lateral meniscus of right knee    S/P cholecystectomy       Review of Systems   Constitutional: Negative. HENT: Negative. Respiratory: Negative. Cardiovascular: Negative. Gastrointestinal: Negative. Musculoskeletal: Negative. All other systems reviewed and are negative. Prior to Visit Medications    Medication Sig Taking?  Authorizing Provider   allopurinol (ZYLOPRIM) 300 MG tablet TAKE 1 TABLET EVERY DAY Yes Luly Sevilla,    irbesartan-hydroCHLOROthiazide (AVALIDE) 150-12.5 MG per tablet TAKE 1 TABLET EVERY DAY Yes Luly Sevilla,    metoprolol succinate (TOPROL XL) 25 MG extended release tablet TAKE 1 TABLET EVERY DAY Yes Luly Sevilla, DO        No Known Allergies    Past Medical History:   Diagnosis Date    Chondromalacia of right knee     Hypertension     Legionnaire's disease ([facility] [address]        Past Surgical History:   Procedure Laterality Date    CHOLECYSTECTOMY, LAPAROSCOPIC N/A 10/21/2019    CHOLECYSTECTOMY LAPAROSCOPIC, with conversion to open performed by Dat Mathew MD at [address] [facility] [address]  [facility]     ELBOW SURGERY Right     ELECTROCARDIOGRAM  2018    JOINT REPLACEMENT      OTHER SURGICAL HISTORY  10/13/2015    RIGHT KNEE ARTHROSCOPY    SHOULDER SURGERY Left 09/28/2022    [facility]       Social History     Socioeconomic History    Marital status:      Spouse name: Not on file    Number of children: Not on file    Years of education: Not on file    Highest education level: Not on file   Occupational History    Not on file   Tobacco Use    Smoking status: Never    Smokeless tobacco: Never   Vaping Use    Vaping Use: Never used   Substance and Sexual Activity    Alcohol use: No    Drug use: No    Sexual activity: Yes     Partners: Female   Other Topics Concern    Not on file   Social History Narrative    Not on file     Social Determinants of Health     Financial Resource Strain: Low Risk     Difficulty of Paying Living Expenses: Not hard at all   Food Insecurity: No Food Insecurity    Worried About Running Out of Food in the Last Year: Never true    Giovanna of Food in the Last Year: Never true   Transportation Needs: Not on file   Physical Activity: Insufficiently Active    Days of Exercise per Week: 4 days    Minutes of Exercise per Session: 30 min   Stress: Not on file   Social Connections: Not on file   Intimate Partner Violence: Not on file   Housing Stability: Not on file        Family History   Problem Relation Age of Onset    Other Mother         Car accident    Emphysema Father     Cancer Sister         lung    Other Brother         car accident    Diabetes Paternal Aunt     Parkinsonism Paternal Uncle     Heart Disease Neg Hx     High Blood Pressure Neg Hx     Stroke Neg Hx        ADVANCE DIRECTIVE: N, <no information>    Vitals:    01/03/23 1013   BP: 126/78   Site: Left Upper Arm   Position: Sitting   Cuff Size: Large Adult   Pulse: 86   Resp: 16   Weight: 237 lb 8 oz (107.7 kg)   Height: 5' 11.25\" (1.81 m)     Estimated body mass index is 32.89 kg/m² as calculated from the following:    Height as of this encounter: 5' 11.25\" (1.81 m). Weight as of this encounter: 237 lb 8 oz (107.7 kg). Physical Exam  Vitals and nursing note reviewed.   Constitutional:       General: He is not in acute distress.     Appearance: Normal appearance. He is well-developed.   HENT:      Head: Normocephalic and atraumatic.      Right Ear: Tympanic membrane normal.      Left Ear: Tympanic membrane normal.   Eyes:      Conjunctiva/sclera: Conjunctivae normal.   Cardiovascular:      Rate and Rhythm: Normal rate and regular rhythm.      Heart sounds: Normal heart sounds. No murmur heard.  Pulmonary:      Effort: Pulmonary effort is normal.      Breath sounds: Normal breath sounds. No wheezing, rhonchi or rales.   Abdominal:      General: There is no distension.   Musculoskeletal:      Cervical back: Neck supple.   Skin:     General: Skin is warm and dry.      Findings: No rash (on exposed surfaces).   Neurological:      General: No focal deficit present.      Mental Status: He is alert.   Psychiatric:         Attention and Perception: Attention normal.         Mood and Affect: Mood normal.         Speech: Speech normal.         Behavior: Behavior normal. Behavior is cooperative.         Thought Content: Thought content normal.         Judgment: Judgment normal.       No flowsheet data found.    Lab Results   Component Value Date/Time    CHOL 160 10/22/2020 07:14 AM    CHOL 159 03/08/2019 07:15 AM    CHOL 164 12/21/2017 09:23 AM    CHOL 184 12/29/2014 12:00 AM    TRIG 176 10/22/2020 07:14 AM    TRIG 116 03/08/2019 07:15 AM    TRIG 192 12/21/2017 09:23 AM    HDL 44 10/22/2020 07:14 AM    HDL 43 03/08/2019 07:15 AM    HDL 46 12/21/2017 09:23 AM    LDLCALC 81 10/22/2020 07:14 AM    LDLCALC 93 03/08/2019 07:15 AM    LDLCALC 80 12/21/2017 09:23 AM    GLUCOSE 82 09/15/2022 12:00 AM    GLUCOSE 108 12/21/2017 09:23 AM    LABA1C 5.9 10/22/2020 07:14 AM    LABA1C 5.9 10/23/2019 05:37 AM    LABA1C 5.7 03/08/2019 07:15 AM       The 10-year ASCVD risk score (Akhil HERNANDEZ, et al., 2019) is: 19.3%    Values used to calculate the score:      Age: 72 years      Sex: Male      Is Non-  : No      Diabetic: No      Tobacco smoker: No      Systolic Blood Pressure: 155 mmHg      Is BP treated: Yes      HDL Cholesterol: 44 mg/dL      Total Cholesterol: 160 mg/dL    Immunization History   Administered Date(s) Administered    COVID-19, MODERNA BLUE border, Primary or Immunocompromised, (age 12y+), IM, 100 mcg/0.5mL 05/03/2022    Influenza, FLUAD, (age 72 y+), Adjuvanted, 0.5mL 09/29/2020    Influenza, FLUARIX, FLULAVAL, FLUZONE (age 10 mo+) AND AFLURIA, (age 1 y+), PF, 0.5mL 10/18/2019    Influenza, High Dose (Fluzone 65 yrs and older) 02/01/2018    Pneumococcal Conjugate 13-valent ([id number]) 02/01/2018    Pneumococcal Polysaccharide ([id number]) 03/07/2019       Health Maintenance   Topic Date Due    Depression Screen  Never done    Hepatitis C screen  Never done    DTaP/Tdap/Td vaccine (1 - Tdap) Never done    Shingles vaccine (1 of 2) Never done    A1C test (Diabetic or Prediabetic)  10/22/2021    COVID-19 Vaccine (2 - Moderna series) 05/31/2022    Flu vaccine (1) 08/01/2022    Annual Wellness Visit (AWV)  01/04/2024    Lipids  10/22/2025    Colorectal Cancer Screen  06/06/2032    Pneumococcal 65+ years Vaccine  Completed    Hepatitis A vaccine  Aged Out    Hib vaccine  Aged Out    Meningococcal (ACWY) vaccine  Aged Out       Assessment & Plan   Medicare annual wellness visit, subsequent  Hypertension, unspecified type  -     CBC with Auto Differential; Future  Idiopathic gout, unspecified chronicity, unspecified site  -     Uric Acid; Future  Obesity with body mass index greater than 30  Pure hypercholesterolemia  -     Lipid Panel w/ Reflex Direct LDL; Future  -     Comprehensive Metabolic Panel; Future  -     TSH with Reflex; Future  IFG (impaired fasting glucose)  -     Comprehensive Metabolic Panel; Future  -     Hemoglobin A1C; Future  Screening for prostate cancer  -     PSA Screening;  Future    -  Chronic medical problems stable  -  Continue current medications  -  Follow up with specialists as scheduled   -  Check labs, will call    Return for Medicare Annual Wellness Visit in 1 year. --Day Wray, DO      Medicare Annual Wellness Visit    Bentley Linares is here for Medicare AWV    Assessment & Plan   Medicare annual wellness visit, subsequent  Hypertension, unspecified type  -     CBC with Auto Differential; Future  Idiopathic gout, unspecified chronicity, unspecified site  -     Uric Acid; Future  Obesity with body mass index greater than 30  Pure hypercholesterolemia  -     Lipid Panel w/ Reflex Direct LDL; Future  -     Comprehensive Metabolic Panel; Future  -     TSH with Reflex; Future  IFG (impaired fasting glucose)  -     Comprehensive Metabolic Panel; Future  -     Hemoglobin A1C; Future  Screening for prostate cancer  -     PSA Screening; Future      Recommendations for Preventive Services Due: see orders and patient instructions/AVS.  Recommended screening schedule for the next 5-10 years is provided to the patient in written form: see Patient Instructions/AVS.     Return for Medicare Annual Wellness Visit in 1 year. Subjective       Patient's complete Health Risk Assessment and screening values have been reviewed and are found in Flowsheets. The following problems were reviewed today and where indicated follow up appointments were made and/or referrals ordered.     Positive Risk Factor Screenings with Interventions:                 Weight and Activity:  Physical Activity: Insufficiently Active    Days of Exercise per Week: 4 days    Minutes of Exercise per Session: 30 min     On average, how many days per week do you engage in moderate to strenuous exercise (like a brisk walk)?: 4 days  Have you lost any weight without trying in the past 3 months?: No  Body mass index: (!) 32.89    Obesity Interventions:  See AVS for additional education material  See A/P for plan and any pertinent orders            Vision Screen:  Do you have difficulty driving, watching TV, or doing any of your daily activities because of your eyesight?: No  Have you had an eye exam within the past year?: (!) No  No results found. Interventions:   Patient encouraged to make appointment with their eye specialist                      Objective   Vitals:    01/03/23 1013   BP: 126/78   Site: Left Upper Arm   Position: Sitting   Cuff Size: Large Adult   Pulse: 86   Resp: 16   Weight: 237 lb 8 oz (107.7 kg)   Height: 5' 11.25\" (1.81 m)      Body mass index is 32.89 kg/m². No Known Allergies  Prior to Visit Medications    Medication Sig Taking?  Authorizing Provider   allopurinol (ZYLOPRIM) 300 MG tablet TAKE 1 TABLET EVERY DAY Yes Kimani Martinez DO   irbesartan-hydroCHLOROthiazide (AVALIDE) 150-12.5 MG per tablet TAKE 1 TABLET EVERY DAY Yes Kimani Martinez DO   metoprolol succinate (TOPROL XL) 25 MG extended release tablet TAKE 1 TABLET EVERY DAY Yes Kimani Martinez DO       CareTeam (Including outside providers/suppliers regularly involved in providing care):   Patient Care Team:  Kimani Martinez DO as PCP - General (Family Medicine)  Kimani Martinez DO as PCP - [facility] Empaneled Provider  Amari Faustin MD as Orthopedic Surgeon (Orthopedic Surgery)  Sarah Miner MD as Surgeon (General Surgery)     Reviewed and updated this visit:  Tobacco  Allergies  Meds  Med Hx  Surg Hx  Soc Hx  Fam Hx

## 2023-01-04 ENCOUNTER — HOSPITAL ENCOUNTER (OUTPATIENT)
Age: 73
Discharge: HOME | End: 2023-01-04
Payer: COMMERCIAL

## 2023-01-04 DIAGNOSIS — Z12.5 SCREENING FOR PROSTATE CANCER: ICD-10-CM

## 2023-01-04 DIAGNOSIS — R73.01 IFG (IMPAIRED FASTING GLUCOSE): ICD-10-CM

## 2023-01-04 DIAGNOSIS — M10.00 IDIOPATHIC GOUT, UNSPECIFIED CHRONICITY, UNSPECIFIED SITE: ICD-10-CM

## 2023-01-04 DIAGNOSIS — I10 HYPERTENSION, UNSPECIFIED TYPE: ICD-10-CM

## 2023-01-04 DIAGNOSIS — E78.00 PURE HYPERCHOLESTEROLEMIA: ICD-10-CM

## 2023-01-04 LAB
ALBUMIN SERPL-MCNC: 3.9 G/DL (ref 3.5–5.1)
ALP BLD-CCNC: 109 U/L (ref 38–126)
ALT SERPL-CCNC: 21 U/L (ref 11–66)
ANION GAP SERPL CALCULATED.3IONS-SCNC: 13 MEQ/L (ref 8–16)
AST SERPL-CCNC: 22 U/L (ref 5–40)
AVERAGE GLUCOSE: 123 MG/DL (ref 70–126)
BASOPHILS # BLD: 0.6 %
BASOPHILS ABSOLUTE: 0 THOU/MM3 (ref 0–0.1)
BILIRUB SERPL-MCNC: 0.7 MG/DL (ref 0.3–1.2)
BUN BLDV-MCNC: 19 MG/DL (ref 7–22)
CALCIUM SERPL-MCNC: 9.2 MG/DL (ref 8.5–10.5)
CHLORIDE BLD-SCNC: 107 MEQ/L (ref 98–111)
CHOLESTEROL, TOTAL: 169 MG/DL (ref 100–199)
CO2: 24 MEQ/L (ref 23–33)
CREAT SERPL-MCNC: 0.8 MG/DL (ref 0.4–1.2)
EOSINOPHIL # BLD: 2.3 %
EOSINOPHILS ABSOLUTE: 0.2 THOU/MM3 (ref 0–0.4)
ERYTHROCYTE [DISTWIDTH] IN BLOOD BY AUTOMATED COUNT: 13.6 % (ref 11.5–14.5)
ERYTHROCYTE [DISTWIDTH] IN BLOOD BY AUTOMATED COUNT: 47.8 FL (ref 35–45)
GFR SERPL CREATININE-BSD FRML MDRD: > 60 ML/MIN/1.73M2
GLUCOSE BLD-MCNC: 116 MG/DL (ref 70–108)
HBA1C MFR BLD: 6.1 % (ref 4.4–6.4)
HCT VFR BLD CALC: 49.9 % (ref 42–52)
HDLC SERPL-MCNC: 40 MG/DL
HEMOGLOBIN: 16 GM/DL (ref 14–18)
IMMATURE GRANS (ABS): 0.04 THOU/MM3 (ref 0–0.07)
IMMATURE GRANULOCYTES: 0.6 %
LDL CHOLESTEROL CALCULATED: 98 MG/DL
LYMPHOCYTES # BLD: 24.5 %
LYMPHOCYTES ABSOLUTE: 1.6 THOU/MM3 (ref 1–4.8)
MCH RBC QN AUTO: 30.5 PG (ref 26–33)
MCHC RBC AUTO-ENTMCNC: 32.1 GM/DL (ref 32.2–35.5)
MCV RBC AUTO: 95.2 FL (ref 80–94)
MONOCYTES # BLD: 9.8 %
MONOCYTES ABSOLUTE: 0.6 THOU/MM3 (ref 0.4–1.3)
NUCLEATED RED BLOOD CELLS: 0 /100 WBC
PLATELET # BLD: 244 THOU/MM3 (ref 130–400)
PMV BLD AUTO: 10.1 FL (ref 9.4–12.4)
POTASSIUM SERPL-SCNC: 4.2 MEQ/L (ref 3.5–5.2)
PROSTATE SPECIFIC ANTIGEN: 1.04 NG/ML (ref 0–1)
RBC # BLD: 5.24 MILL/MM3 (ref 4.7–6.1)
SEG NEUTROPHILS: 62.2 %
SEGMENTED NEUTROPHILS ABSOLUTE COUNT: 4.1 THOU/MM3 (ref 1.8–7.7)
SODIUM BLD-SCNC: 144 MEQ/L (ref 135–145)
TOTAL PROTEIN: 6.5 G/DL (ref 6.1–8)
TRIGL SERPL-MCNC: 156 MG/DL (ref 0–199)
TSH SERPL DL<=0.05 MIU/L-ACNC: 3.23 UIU/ML (ref 0.4–4.2)
URIC ACID: 5.3 MG/DL (ref 3.7–7)
WBC # BLD: 6.6 THOU/MM3 (ref 4.8–10.8)

## 2023-01-04 PROCEDURE — G0103 PSA SCREENING: HCPCS

## 2023-01-04 PROCEDURE — 84550 ASSAY OF BLOOD/URIC ACID: CPT

## 2023-01-04 PROCEDURE — 83036 HEMOGLOBIN GLYCOSYLATED A1C: CPT

## 2023-01-04 PROCEDURE — 84443 ASSAY THYROID STIM HORMONE: CPT

## 2023-01-04 PROCEDURE — 80053 COMPREHEN METABOLIC PANEL: CPT

## 2023-01-04 PROCEDURE — 85025 COMPLETE CBC W/AUTO DIFF WBC: CPT

## 2023-01-04 PROCEDURE — 80061 LIPID PANEL: CPT

## 2023-01-04 PROCEDURE — 36415 COLL VENOUS BLD VENIPUNCTURE: CPT

## 2023-01-05 ENCOUNTER — HOSPITAL ENCOUNTER (OUTPATIENT)
Dept: OCCUPATIONAL THERAPY | Age: 73
Setting detail: THERAPIES SERIES
Discharge: HOME | End: 2023-01-05
Payer: COMMERCIAL

## 2023-01-05 PROCEDURE — 97110 THERAPEUTIC EXERCISES: CPT

## 2023-01-05 NOTE — PROGRESS NOTES
** PLEASE SIGN, DATE AND TIME CERTIFICATION BELOW AND RETURN TO [facility] OUTPATIENT REHABILITATION (FAX #: [phone]). ATTEST/CO-SIGN IF ACCESSING VIA [employer]. THANK YOU.**    I certify that I have examined the patient below and determined that Physical Medicine and Rehabilitation service is necessary and that I approve the established plan of care for up to 90 days or as specifically noted. Attestation, signature or co-signature of physician indicates approval of certification requirements.    ________________________ ____________ __________  Physician Signature   Date   Time    [address]  [] EVALUATION  [] DAILY NOTE (LAND) [] DAILY NOTE (AQUATIC )   [x] PROGRESS NOTE [] DISCHARGE NOTE    [] [facility]   [x] [facility] 90    [] [facility]   [] Michial Bending    Date: 2023  Patient Name:  Wandra Aase  : 1950  MRN: [id number]  CSN: [id number]    Referring Practitioner Sharifa Alves MD   Diagnosis Complete rotator cuff tear or rupture of left shoulder, not specified as traumatic [M75.122]  Superior glenoid labrum lesion of left shoulder, initial encounter [Y45.388Z]    Treatment Diagnosis Decreased ROM L UE, increased pain L UE, decreased ADLs   Date of Evaluation 10/3/22      Functional Outcome Measure Used UEFS   Functional Outcome Score 6/80 (10/3/22) 73/80 (23)      Insurance: Primary: Payor: Duffy Kanner /  /  / ,   Secondary:    Authorization Information: PRECERTIFICATION REQUIRED:  No  INSURANCE THERAPY BENEFIT:  Patient has unlimited visits based on medical necessity  Benefit will not cover maintenance or preventative treatment.   AQUATIC THERAPY COVERED:   Yes  MODALITIES COVERED:  Yes, with the exception of iontophoresis and hot packs/cold pack  TELEHEALTH COVERED: Yes   Visit # 26, PN completed on 23   Visits Allowed: Unlimited   Recertification Date: 47   Physician Follow-Up: 3/10/23   Physician Orders: Follow protocol   Pertinent History:  Patient reports that his shoulder gave out in January of 2022. Patient underwent surgery on 9/28/22 for arthroscopic RCR, BT, SAD, DCR, ED.      has a past medical history of Chondromalacia of right knee, Hypertension, and Legionnaire's disease ([facility] [address] SUBJECTIVE:    Patient states that once in a while he gets a pain when he puts on a coat. States that he also gets an occasional pain in upper arm which happens at random times.      TREATMENT   Precautions: Goes by Chandan Garcia, surgery date 9/28/22, follow protocol for massive RCR, BT, no resistive elbow flexion x 6 weeks   Pain:  No pain at time of therapy LOCATION: left upper arm along bicep    X in shaded column indicates Activity Completed Today   Modalities Parameters/  Location  Notes/Comments                     Manual Therapy Time/  Technique  Notes/Comments   STM to left upper trapezius region      STM to left upper trap, bicep, and deltoid            Exercises   Sets/  Sec Reps  Notes/Comments   Pulleys for shoulder flexion 1 15  Warm up   Scapular retraction 1 15     Pendulums with left UE - all 3 directions 1 10 each     Supine ER with dowel within 40 degree limitation 1 15     Submaximal isometrics with left UE - shoulder flexion, extension, abduction, IR, and ER 1 10 each  5 second hold   Supine dowel for shoulder flexion and chest press 1 10 each     Biodex at 60 speed x 3 minutes forward and 3 minutes backward    Cool down this date    Supine PROM to left shoulder in all planes to patient tolerance   x    Wall slide for left shoulder flexion and then removing hand from wall 1 10 x 5 second hold   Pulleys for shoulder flexion 1 15  Warm up this date    IR towel stretch behind the back 10 sec 5     Supine AROM flexion to 90 degrees, sidelying ER with elbow at side, sidelying abduction to 90 degrees 1 10 ea  Good strength noted with no complaints   Supine scapular punch, circles Cw and Ccw, supine flexion 90 to tolerance- hand in neutral  1 10 ea  Trial with 1# this date, good tolerance throughout    Reaching endurance activity - used left UE to place clothespins on and off vertical post 1 2 cycles     green theraband - yusra with left UE 1 10 each     green theraband - standing riivalid 1 10 each     Supine LUE propped on towel roll 1# bicep curl  1 15     Activities Time    Notes/Comments   Education for passive dressing and bathing      Adjusted sling for increased comfort              LEFT UPPER EXTREMITY  RANGE OF MOTION    AROM PROM COMMENTS         Shoulder Flexion 132 160    Shoulder Extension 63     Shoulder Abduction 141 145    Shoulder External Rotation 65 69    Shoulder Internal Rotation Can get left thumb 4\" above waistband behind back at spine 62        LEFT UPPER EXTREMITY  STRENGTH    Strength Rating Comments   Shoulder Flexion 4-/5    Shoulder Extension 4/5    Shoulder Abduction 4/5    Shoulder Adduction 4/5    Shoulder External Rotation 4/5    Shoulder Internal Rotation 4/5            Specific Interventions Next Treatment: follow protocol for massive RCR, BT, no resisitve elbow flexion x 6 weeks    Activity/Treatment Tolerance:  [x]  Patient tolerated treatment well  []  Patient limited by fatigue  []  Patient limited by pain   []  Patient limited by other medical complications  []  Other:     Assessment:  Patient is progressing toward goals slowly. Patient continues to have ROM limitations of left shoulder active motion, but his pain level is very low. Patient is slowly returning to his normal daily tasks but is continuing to follow precautions of no heavy activity with left UE. Further skilled therapy services are required to address ROM and strength of left UE to allow patient to return to normal daily tasks.      GOALS:  Patient Goal: return to previous functional level, return to part time jobs     Short Term Goals:  Time Frame: 4 weeks    Increase passive left shoulder flexion to 165, abduction to 125, ER at 90 to 55, and IR to 65 to increase flexibility to allow patient to complete his premorbid work and daily tasks. GOAL MET FOR ABDUCTION AND ER; GOAL NOT MET FOR FLEXION AND IR - SEE ABOVE FOR DETAILS - REVISED GOAL: Increase passive left shoulder flexion to 165, abduction to 150, ER to 75, and IR to 70 to increase flexibility to allow eventual use of left in normal daily activities. Report no more than 1/10 in left shoulder with HEP upgrades to increase his ability to use left arm in daily activities. GOAL MET - GOAL DISCONTINUED   Patient will be independent with HEP to increase ability for eventual return to work. GOAL MET - CONTINUE GOAL WITH HEP UPGRADES  Increase active left shoulder flexion to 145, abduction to 150, ER at 90 to 70, and get left thumb 4\" above waistband behind back to increase his ability to reach overhead for all normal activities. GOAL MET FOR IR; GOAL NOT MET FOR FLEXION, ABDUCTION, OR ER - SEE ABOVE FOR DETAILS - REVISED GOAL: Increase active left shoulder flexion to 145, abduction to 150, ER at 90 to 70, and get left thumb 5\" above waistband behind back to increase his ability to reach overhead. 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 GOAL MET - REVISED GOAL: Be able to use left arm to help pull self up into semi without difficulty or pain. 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 GOAL NOT MET - CONTINUE GOAL  3.   Be able to use left arm to move heavy grocery bags from cart to car without difficulty. GOAL NOT MET - CONTINUE GOAL    Patient Education:   [x]  HEP/Education Completed: goal status    []  No new Education completed  []  Reviewed Prior HEP      [x]  Patient verbalized and/or demonstrated understanding of education provided. []  Patient unable to verbalize and/or demonstrate understanding of education provided. Will continue education.   [x] Barriers to learning: none    PLAN:      []  Plan of care initiated. Plan to see patient 2 times per week for 12 weeks to address the treatment planned outlined above.   []  Continue with current plan of care  [x]  Modify plan of care as follows: see patient 2 x week x 8 weeks from 1/5/23   []  Hold pending physician visit  []  Discharge    Time In 0830   Time Out 0900   Timed Code Minutes: 30 min   Total Treatment Time: 30 min       Ken De Jesus OTR/L #[id number]

## 2023-01-06 ENCOUNTER — HOSPITAL ENCOUNTER (OUTPATIENT)
Dept: OCCUPATIONAL THERAPY | Age: 73
Setting detail: THERAPIES SERIES
Discharge: HOME | End: 2023-01-06
Payer: COMMERCIAL

## 2023-01-06 PROCEDURE — 97110 THERAPEUTIC EXERCISES: CPT

## 2023-01-06 NOTE — PROGRESS NOTES
[address]  [] EVALUATION  [x] DAILY NOTE (LAND) [] DAILY NOTE (AQUATIC )   [] PROGRESS NOTE [] DISCHARGE NOTE    [] [facility]   [] [address]    [x] [facility]   [] Keo Trujillo    Date: 2023  Patient Name:  Jermaine Casanova  : 1950  MRN: [id number]  CSN: [id number]    Referring Practitioner Kathleen Ramsey MD   Diagnosis Complete rotator cuff tear or rupture of left shoulder, not specified as traumatic [M75.122]  Superior glenoid labrum lesion of left shoulder, initial encounter [Q70.683O]    Treatment Diagnosis Decreased ROM L UE, increased pain L UE, decreased ADLs   Date of Evaluation 10/3/22      Functional Outcome Measure Used UEFS   Functional Outcome Score 680 (10/3/22) 73/80 (23)      Insurance: Primary: Payor: Yrn Simon /  /  / ,   Secondary:    Authorization Information: PRECERTIFICATION REQUIRED:  No  INSURANCE THERAPY BENEFIT:  Patient has unlimited visits based on medical necessity  Benefit will not cover maintenance or preventative treatment. AQUATIC THERAPY COVERED:   Yes  MODALITIES COVERED:  Yes, with the exception of iontophoresis and hot packs/cold pack  TELEHEALTH COVERED: Yes   Visit # 27, 1/10 for PN; PN completed on 23   Visits Allowed: Unlimited   Recertification Date: 36   Physician Follow-Up: 3/10/23   Physician Orders: Follow protocol   Pertinent History:  Patient reports that his shoulder gave out in 2022. Patient underwent surgery on 22 for arthroscopic RCR, BT, SAD, DCR, ED.      has a past medical history of Chondromalacia of right knee, Hypertension, and Legionnaire's disease ([facility] [address] SUBJECTIVE:    States that he had some pain in his upper arm yesterday, but he realized that it was from the elbow because his elbow popped and the pain went away.      TREATMENT   Precautions: Goes by Manuel Robledo, surgery date 22, follow protocol for massive RCR, BT, no resistive elbow flexion x 6 weeks   Pain:  No pain at time of therapy LOCATION: left upper arm along bicep    X in shaded column indicates Activity Completed Today   Modalities Parameters/  Location  Notes/Comments                     Manual Therapy Time/  Technique  Notes/Comments   STM to left upper trapezius region      STM to left upper trap, bicep, and deltoid            Exercises   Sets/  Sec Reps  Notes/Comments   Pulleys for shoulder flexion 1 15  Warm up   Scapular retraction 1 15     Pendulums with left UE - all 3 directions 1 10 each     Supine ER with dowel within 40 degree limitation 1 15     Submaximal isometrics with left UE - shoulder flexion, extension, abduction, IR, and ER 1 10 each  5 second hold   Supine dowel for shoulder flexion and chest press 1 10 each     Biodex at 60 speed x 3 minutes forward and 3 minutes backward   x     Supine PROM to left shoulder in all planes to patient tolerance       Wall slide for left shoulder flexion and then removing hand from wall 1 10  5 second hold   Pulleys for shoulder flexion 1 15  Warm up this date    IR towel stretch behind the back 10 sec 5     Supine AROM flexion to 90 degrees, sidelying ER with elbow at side, sidelying abduction to 90 degrees 1 10 ea  Good strength noted with no complaints   Supine scapular punch, circles Cw and Ccw, supine flexion 90 to tolerance- hand in neutral  1 10 ea  Trial with 1# this date, good tolerance throughout    Reaching endurance activity - used left UE to place clothespins on and off vertical post 1 2 cycles x    green theraband - yusra with left UE 1 10 each x    green theraband - standing riivalid 1 10 each x    Supine LUE propped on towel roll 1# bicep curl  1 15     Activities Time    Notes/Comments   Education for passive dressing and bathing      Adjusted sling for increased comfort                Specific Interventions Next Treatment: follow protocol for massive RCR, BT, no resisitve elbow flexion x 6 weeks    Activity/Treatment Tolerance:  [x]  Patient tolerated treatment well  []  Patient limited by fatigue  []  Patient limited by pain   []  Patient limited by other medical complications  []  Other:     Assessment:  Progressing toward goals nicely. Patient does continue to have some weakness in left UE. GOALS:  Patient Goal: return to previous functional level, return to part time jobs     Short Term Goals:  Time Frame: 4 weeks     Increase passive left shoulder flexion to 165, abduction to 150, ER to 75, and IR to 70 to increase flexibility to allow eventual use of left in normal daily activities. NO PREVIOUS GOAL   Patient will be independent with HEP to increase ability for eventual return to work. Increase active left shoulder flexion to 145, abduction to 150, ER at 90 to 70, and get left thumb 5\" above waistband behind back to increase his ability to reach overhead. Long Term Goals:  Time Frame: 8 weeks     Be able to use left arm to help pull self up into semi without difficulty or pain. 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 3.   Be able to use left arm to move heavy grocery bags from cart to car without difficulty. Patient Education:   [x]  HEP/Education Completed: to use blue theraband for HEP    []  No new Education completed  []  Reviewed Prior HEP      [x]  Patient verbalized and/or demonstrated understanding of education provided. []  Patient unable to verbalize and/or demonstrate understanding of education provided. Will continue education. [x]  Barriers to learning: none    PLAN:      []  Plan of care initiated. Plan to see patient 2 times per week for 12 weeks to address the treatment planned outlined above.   [x]  Continue with current plan of care  []  Modify plan of care as follows: see patient 2 x week x 8 weeks from 1/5/23   []  Hold pending physician visit  []  Discharge    Time In 0900   Time Out 0930   Timed Code Minutes: 30 min   Total Treatment Time: 30 min       Sandy Domínguez OTR/ANTON #[id number]

## 2023-01-10 ENCOUNTER — APPOINTMENT (OUTPATIENT)
Dept: OCCUPATIONAL THERAPY | Age: 73
End: 2023-01-10
Payer: COMMERCIAL

## 2023-01-12 ENCOUNTER — HOSPITAL ENCOUNTER (OUTPATIENT)
Dept: OCCUPATIONAL THERAPY | Age: 73
Setting detail: THERAPIES SERIES
Discharge: HOME | End: 2023-01-12
Payer: COMMERCIAL

## 2023-01-12 PROCEDURE — 97110 THERAPEUTIC EXERCISES: CPT

## 2023-01-12 NOTE — DISCHARGE SUMMARY
[address]  [] EVALUATION  [] DAILY NOTE (LAND) [] DAILY NOTE (AQUATIC )   [] PROGRESS NOTE [x] DISCHARGE NOTE    [] [facility]   [x] [facility]    [] [facility]   [] [facility]    Date: 2023  Patient Name:  Jer Peres  : 1950  MRN: [id number]  CSN: [id number]    Referring Practitioner Yonathan Abel MD   Diagnosis Complete rotator cuff tear or rupture of left shoulder, not specified as traumatic [M75.122]  Superior glenoid labrum lesion of left shoulder, initial encounter [E22.019Y]    Treatment Diagnosis Decreased ROM L UE, increased pain L UE, decreased ADLs   Date of Evaluation 10/3/22      Functional Outcome Measure Used UEFS   Functional Outcome Score  (10/3/22) 73/80 (23) 74/80 (23)      Insurance: Primary: Payor: Ca [employer] /  /  / ,   Secondary:    Authorization Information: PRECERTIFICATION REQUIRED:  No  INSURANCE THERAPY BENEFIT:  Patient has unlimited visits based on medical necessity  Benefit will not cover maintenance or preventative treatment. AQUATIC THERAPY COVERED:   Yes  MODALITIES COVERED:  Yes, with the exception of iontophoresis and hot packs/cold pack  TELEHEALTH COVERED: Yes   Visit # 28   Visits Allowed: Unlimited   Recertification Date: 46   Physician Follow-Up: 3/10/23   Physician Orders: Follow protocol   Pertinent History:  Patient reports that his shoulder gave out in 2022. Patient underwent surgery on 22 for arthroscopic RCR, BT, SAD, DCR, ED.      has a past medical history of Chondromalacia of right knee, Hypertension, and Legionnaire's disease ([facility] [address] SUBJECTIVE:    States that he only has pain in his left elbow and he is aware of what the problem is going on with his elbow. States that he feels as though he is doing \"real well\" regarding his shoulder rehab.      TREATMENT   Precautions: Goes by Samira Gomez, surgery date 22, follow protocol for massive RCR, BT, no resistive elbow flexion x 6 weeks   Pain:  No pain at time of therapy LOCATION: left upper arm along bicep    X in shaded column indicates Activity Completed Today   Modalities Parameters/  Location  Notes/Comments                     Manual Therapy Time/  Technique  Notes/Comments   STM to left upper trapezius region      STM to left upper trap, bicep, and deltoid            Exercises   Sets/  Sec Reps  Notes/Comments   Pulleys for shoulder flexion 1 15  Warm up   Scapular retraction 1 15     Pendulums with left UE - all 3 directions 1 10 each     Supine ER with dowel within 40 degree limitation 1 15     Submaximal isometrics with left UE - shoulder flexion, extension, abduction, IR, and ER 1 10 each  5 second hold   Supine dowel for shoulder flexion and chest press 1 10 each     Biodex at 60 speed x 3 minutes forward and 3 minutes backward   X     Supine PROM to left shoulder in all planes to patient tolerance       Wall slide for left shoulder flexion and then removing hand from wall 1 10  5 second hold   Pulleys for shoulder flexion 1 15 X Warm up this date    IR towel stretch behind the back 10 sec 5     Supine AROM flexion to 90 degrees, sidelying ER with elbow at side, sidelying abduction to 90 degrees 1 10 ea  Good strength noted with no complaints   Supine scapular punch, circles Cw and Ccw, supine flexion 90 to tolerance- hand in neutral  1 10 ea  Trial with 1# this date, good tolerance throughout    Reaching endurance activity - used left UE to place clothespins on and off vertical post 1 2 cycles     green theraband - yusra with left UE 1 10 each     green theraband - standing riivalid 1 10 each     Supine LUE propped on towel roll 1# bicep curl  1 15     Activities Time    Notes/Comments   Education for passive dressing and bathing      Adjusted sling for increased comfort              LEFT UPPER EXTREMITY  RANGE OF MOTION    AROM PROM COMMENTS Shoulder Flexion 137 164    Shoulder Extension 50     Shoulder Abduction 140 125    Shoulder External Rotation 78 60    Shoulder Internal Rotation Can get left thumb 6.5\" above waist behind back 80        LEFT UPPER EXTREMITY  STRENGTH    Strength Rating Comments   Shoulder Flexion 4+/5    Shoulder Extension 4+/5    Shoulder Abduction 4+/5    Shoulder Adduction 4+/5    Shoulder External Rotation 4+/5    Shoulder Internal Rotation 4+/5              Specific Interventions Next Treatment: follow protocol for massive RCR, BT, no resisitve elbow flexion x 6 weeks    Activity/Treatment Tolerance:  [x]  Patient tolerated treatment well  []  Patient limited by fatigue  []  Patient limited by pain   []  Patient limited by other medical complications  []  Other:     Assessment:   Patient has made very nice progress toward goals. Patient does continue to have some limitations in both AROM and PROM of left shoulder, but patient is able to complete his normal work and daily activities. Patient has been instructed to continue with HEP after discharge from therapy. GOALS:  Patient Goal: return to previous functional level, return to part time jobs     Short Term Goals:  Time Frame: 4 weeks     Increase passive left shoulder flexion to 165, abduction to 150, ER to 75, and IR to 70 to increase flexibility to allow eventual use of left in normal daily activities. GOAL MET FOR FLEXION AND IR; GOAL NOT MET FOR ABDUCTION AND ER - SEE ABOVE FOR DETAILS   NO PREVIOUS GOAL   Patient will be independent with HEP to increase ability for eventual return to work. GOAL MET   Increase active left shoulder flexion to 145, abduction to 150, ER at 90 to 70, and get left thumb 5\" above waistband behind back to increase his ability to reach overhead.  GOAL MET FOR ER AND IR; GOAL NOT MET FOR FLEXION AND ABDUCTION - SEE ABOVE FOR DETAILS      Long Term Goals:  Time Frame: 8 weeks     Be able to use left arm to help pull self up into semi without difficulty or pain. 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 GOAL MET  3.   Be able to use left arm to move heavy grocery bags from cart to car without difficulty. GOAL MET    Patient Education:   [x]  HEP/Education Completed: goal status; to continue with HEP after discharge    []  No new Education completed  []  Reviewed Prior HEP      [x]  Patient verbalized and/or demonstrated understanding of education provided. []  Patient unable to verbalize and/or demonstrate understanding of education provided. Will continue education. [x]  Barriers to learning: none    PLAN:      []  Plan of care initiated. Plan to see patient 2 times per week for 12 weeks to address the treatment planned outlined above.   []  Continue with current plan of care  []  Modify plan of care as follows: see patient 2 x week x 8 weeks from 1/5/23   []  Hold pending physician visit  [x]  Discharge    Time In 0830   Time Out 0900   Timed Code Minutes: 30 min   Total Treatment Time: 30 min       Barrett Yen OTR/L #[id number]

## 2024-01-04 RX ORDER — IRBESARTAN AND HYDROCHLOROTHIAZIDE 150; 12.5 MG/1; MG/1
TABLET, FILM COATED ORAL
Qty: 90 TABLET | Refills: 3 | Status: SHIPPED | OUTPATIENT
Start: 2024-01-04

## 2024-01-04 RX ORDER — METOPROLOL SUCCINATE 25 MG/1
TABLET, EXTENDED RELEASE ORAL
Qty: 90 TABLET | Refills: 3 | Status: SHIPPED | OUTPATIENT
Start: 2024-01-04

## 2024-01-04 RX ORDER — ALLOPURINOL 300 MG/1
TABLET ORAL
Qty: 90 TABLET | Refills: 3 | Status: SHIPPED | OUTPATIENT
Start: 2024-01-04

## 2024-01-04 NOTE — TELEPHONE ENCOUNTER
Patient requesting refill of Allopurinol, Metoprolol and Irbesartan-HCTZ to [facility].  Medicare AWV scheduled for 2/20/24 when patient comes back from vacation.  Leaving next week until the middle of February.  Please refill if appropriate.  Will check with pharmacy after 5pm.

## 2024-03-04 SDOH — HEALTH STABILITY: PHYSICAL HEALTH: ON AVERAGE, HOW MANY DAYS PER WEEK DO YOU ENGAGE IN MODERATE TO STRENUOUS EXERCISE (LIKE A BRISK WALK)?: 5 DAYS

## 2024-03-04 SDOH — ECONOMIC STABILITY: HOUSING INSECURITY
IN THE LAST 12 MONTHS, WAS THERE A TIME WHEN YOU DID NOT HAVE A STEADY PLACE TO SLEEP OR SLEPT IN A SHELTER (INCLUDING NOW)?: NO

## 2024-03-04 SDOH — ECONOMIC STABILITY: FOOD INSECURITY: WITHIN THE PAST 12 MONTHS, YOU WORRIED THAT YOUR FOOD WOULD RUN OUT BEFORE YOU GOT MONEY TO BUY MORE.: NEVER TRUE

## 2024-03-04 SDOH — HEALTH STABILITY: PHYSICAL HEALTH: ON AVERAGE, HOW MANY MINUTES DO YOU ENGAGE IN EXERCISE AT THIS LEVEL?: 60 MIN

## 2024-03-04 SDOH — ECONOMIC STABILITY: FOOD INSECURITY: WITHIN THE PAST 12 MONTHS, THE FOOD YOU BOUGHT JUST DIDN'T LAST AND YOU DIDN'T HAVE MONEY TO GET MORE.: NEVER TRUE

## 2024-03-04 SDOH — ECONOMIC STABILITY: TRANSPORTATION INSECURITY
IN THE PAST 12 MONTHS, HAS LACK OF TRANSPORTATION KEPT YOU FROM MEETINGS, WORK, OR FROM GETTING THINGS NEEDED FOR DAILY LIVING?: NO

## 2024-03-04 SDOH — ECONOMIC STABILITY: INCOME INSECURITY: HOW HARD IS IT FOR YOU TO PAY FOR THE VERY BASICS LIKE FOOD, HOUSING, MEDICAL CARE, AND HEATING?: NOT HARD AT ALL

## 2024-03-04 ASSESSMENT — PATIENT HEALTH QUESTIONNAIRE - PHQ9
1. LITTLE INTEREST OR PLEASURE IN DOING THINGS: 0
SUM OF ALL RESPONSES TO PHQ QUESTIONS 1-9: 0
2. FEELING DOWN, DEPRESSED OR HOPELESS: 0
SUM OF ALL RESPONSES TO PHQ9 QUESTIONS 1 & 2: 0
SUM OF ALL RESPONSES TO PHQ QUESTIONS 1-9: 0

## 2024-03-04 ASSESSMENT — LIFESTYLE VARIABLES
HOW OFTEN DO YOU HAVE A DRINK CONTAINING ALCOHOL: NEVER
HOW OFTEN DO YOU HAVE SIX OR MORE DRINKS ON ONE OCCASION: 1
HOW MANY STANDARD DRINKS CONTAINING ALCOHOL DO YOU HAVE ON A TYPICAL DAY: PATIENT DOES NOT DRINK
HOW OFTEN DO YOU HAVE A DRINK CONTAINING ALCOHOL: 1
HOW MANY STANDARD DRINKS CONTAINING ALCOHOL DO YOU HAVE ON A TYPICAL DAY: 0

## 2024-03-05 ENCOUNTER — OFFICE VISIT (OUTPATIENT)
Dept: FAMILY MEDICINE CLINIC | Age: 74
End: 2024-03-05
Payer: COMMERCIAL

## 2024-03-05 VITALS
BODY MASS INDEX: 32.89 KG/M2 | SYSTOLIC BLOOD PRESSURE: 138 MMHG | WEIGHT: 234.9 LBS | RESPIRATION RATE: 16 BRPM | DIASTOLIC BLOOD PRESSURE: 82 MMHG | HEART RATE: 88 BPM | HEIGHT: 71 IN

## 2024-03-05 DIAGNOSIS — E78.00 PURE HYPERCHOLESTEROLEMIA: ICD-10-CM

## 2024-03-05 DIAGNOSIS — R73.01 IFG (IMPAIRED FASTING GLUCOSE): ICD-10-CM

## 2024-03-05 DIAGNOSIS — M10.00 IDIOPATHIC GOUT, UNSPECIFIED CHRONICITY, UNSPECIFIED SITE: ICD-10-CM

## 2024-03-05 DIAGNOSIS — Z00.00 MEDICARE ANNUAL WELLNESS VISIT, SUBSEQUENT: Primary | ICD-10-CM

## 2024-03-05 DIAGNOSIS — E66.9 OBESITY WITH BODY MASS INDEX GREATER THAN 30: ICD-10-CM

## 2024-03-05 DIAGNOSIS — I10 HYPERTENSION, UNSPECIFIED TYPE: ICD-10-CM

## 2024-03-05 PROBLEM — M75.120 FULL THICKNESS ROTATOR CUFF TEAR: Status: ACTIVE | Noted: 2024-03-05

## 2024-03-05 PROBLEM — M75.20 BICIPITAL TENOSYNOVITIS: Status: ACTIVE | Noted: 2024-03-05

## 2024-03-05 PROBLEM — M25.519 SHOULDER JOINT PAIN: Status: ACTIVE | Noted: 2024-03-05

## 2024-03-05 PROBLEM — M24.812: Status: ACTIVE | Noted: 2024-03-05

## 2024-03-05 PROBLEM — M75.42 IMPINGEMENT SYNDROME OF LEFT SHOULDER REGION: Status: ACTIVE | Noted: 2024-03-05

## 2024-03-05 PROBLEM — M25.569 KNEE PAIN: Status: ACTIVE | Noted: 2018-02-21

## 2024-03-05 PROBLEM — M19.012 ARTHRITIS OF LEFT ACROMIOCLAVICULAR JOINT: Status: ACTIVE | Noted: 2024-03-05

## 2024-03-05 PROCEDURE — 3079F DIAST BP 80-89 MM HG: CPT | Performed by: FAMILY MEDICINE

## 2024-03-05 PROCEDURE — G0439 PPPS, SUBSEQ VISIT: HCPCS | Performed by: FAMILY MEDICINE

## 2024-03-05 PROCEDURE — 3075F SYST BP GE 130 - 139MM HG: CPT | Performed by: FAMILY MEDICINE

## 2024-03-05 PROCEDURE — 99214 OFFICE O/P EST MOD 30 MIN: CPT | Performed by: FAMILY MEDICINE

## 2024-03-05 PROCEDURE — 1123F ACP DISCUSS/DSCN MKR DOCD: CPT | Performed by: FAMILY MEDICINE

## 2024-03-05 ASSESSMENT — ENCOUNTER SYMPTOMS
RESPIRATORY NEGATIVE: 1
GASTROINTESTINAL NEGATIVE: 1

## 2024-03-05 NOTE — PROGRESS NOTES
call      Return in about 1 year (around 3/5/2025) for AWV.         --Deepak Zhu, DO      Medicare Annual Wellness Visit    Mars Lovelace is here for Medicare AWV    Assessment & Plan   Medicare annual wellness visit, subsequent  Hypertension, unspecified type  -     CBC with Auto Differential; Future  Idiopathic gout, unspecified chronicity, unspecified site  -     Uric Acid; Future  Obesity with body mass index greater than 30  Pure hypercholesterolemia  -     Lipid Panel w/ Reflex Direct LDL; Future  -     Comprehensive Metabolic Panel; Future  -     TSH with Reflex; Future  IFG (impaired fasting glucose)  -     Comprehensive Metabolic Panel; Future  -     Hemoglobin A1C; Future  Recommendations for Preventive Services Due: see orders and patient instructions/AVS.  Recommended screening schedule for the next 5-10 years is provided to the patient in written form: see Patient Instructions/AVS.     Return in about 1 year (around 3/5/2025) for AWV.     Subjective       Patient's complete Health Risk Assessment and screening values have been reviewed and are found in Flowsheets. The following problems were reviewed today and where indicated follow up appointments were made and/or referrals ordered.    Positive Risk Factor Screenings with Interventions:       Cognitive:   Clock Drawing Test (CDT): (!) Abnormal  Words recalled: 3 Words Recalled  Total Score: 3  Total Score Interpretation: Normal Mini-Cog  Interventions:  See AVS for additional education material            Activity, Diet, and Weight:  On average, how many days per week do you engage in moderate to strenuous exercise (like a brisk walk)?: 5 days  On average, how many minutes do you engage in exercise at this level?: 60 min    Do you eat balanced/healthy meals regularly?: Yes    Body mass index is 32.53 kg/m². (!) Abnormal    Obesity Interventions:  See AVS for additional education material              Vision Screen:  Do you have difficulty

## 2024-03-05 NOTE — PATIENT INSTRUCTIONS
and/or copay.    Some of these benefits include a comprehensive review of your medical history including lifestyle, illnesses that may run in your family, and various assessments and screenings as appropriate.    After reviewing your medical record and screening and assessments performed today your provider may have ordered immunizations, labs, imaging, and/or referrals for you.  A list of these orders (if applicable) as well as your Preventive Care list are included within your After Visit Summary for your review.    Other Preventive Recommendations:    A preventive eye exam performed by an eye specialist is recommended every 1-2 years to screen for glaucoma; cataracts, macular degeneration, and other eye disorders.  A preventive dental visit is recommended every 6 months.  Try to get at least 150 minutes of exercise per week or 10,000 steps per day on a pedometer .  Order or download the FREE \"Exercise & Physical Activity: Your Everyday Guide\" from [employer]. Call 1-558.623.6758 or search [employer] online.  You need 0353-6508 mg of calcium and 3969-3599 IU of vitamin D per day. It is possible to meet your calcium requirement with diet alone, but a vitamin D supplement is usually necessary to meet this goal.  When exposed to the sun, use a sunscreen that protects against both UVA and UVB radiation with an SPF of 30 or greater. Reapply every 2 to 3 hours or after sweating, drying off with a towel, or swimming.  Always wear a seat belt when traveling in a car. Always wear a helmet when riding a bicycle or motorcycle.

## 2024-03-07 ENCOUNTER — HOSPITAL ENCOUNTER (OUTPATIENT)
Age: 74
Discharge: HOME | End: 2024-03-07
Payer: COMMERCIAL

## 2024-03-07 DIAGNOSIS — I10 HYPERTENSION, UNSPECIFIED TYPE: ICD-10-CM

## 2024-03-07 DIAGNOSIS — R73.01 IFG (IMPAIRED FASTING GLUCOSE): ICD-10-CM

## 2024-03-07 DIAGNOSIS — E78.00 PURE HYPERCHOLESTEROLEMIA: ICD-10-CM

## 2024-03-07 DIAGNOSIS — M10.00 IDIOPATHIC GOUT, UNSPECIFIED CHRONICITY, UNSPECIFIED SITE: ICD-10-CM

## 2024-03-07 LAB
ALBUMIN SERPL BCG-MCNC: 4.2 G/DL (ref 3.5–5.1)
ALP SERPL-CCNC: 100 U/L (ref 38–126)
ALT SERPL W/O P-5'-P-CCNC: 26 U/L (ref 11–66)
ANION GAP SERPL CALC-SCNC: 12 MEQ/L (ref 8–16)
AST SERPL-CCNC: 30 U/L (ref 5–40)
BASOPHILS ABSOLUTE: 0 THOU/MM3 (ref 0–0.1)
BASOPHILS NFR BLD AUTO: 0.6 %
BILIRUB SERPL-MCNC: 0.7 MG/DL (ref 0.3–1.2)
BUN SERPL-MCNC: 14 MG/DL (ref 7–22)
CALCIUM SERPL-MCNC: 9.6 MG/DL (ref 8.5–10.5)
CHLORIDE SERPL-SCNC: 107 MEQ/L (ref 98–111)
CHOLEST SERPL-MCNC: 142 MG/DL (ref 100–199)
CO2 SERPL-SCNC: 24 MEQ/L (ref 23–33)
CREAT SERPL-MCNC: 0.8 MG/DL (ref 0.4–1.2)
DEPRECATED MEAN GLUCOSE BLD GHB EST-ACNC: 114 MG/DL (ref 70–126)
DEPRECATED RDW RBC AUTO: 50.2 FL (ref 35–45)
EOSINOPHIL NFR BLD AUTO: 2.7 %
EOSINOPHILS ABSOLUTE: 0.2 THOU/MM3 (ref 0–0.4)
ERYTHROCYTE [DISTWIDTH] IN BLOOD BY AUTOMATED COUNT: 13.8 % (ref 11.5–14.5)
GFR SERPL CREATININE-BSD FRML MDRD: > 60 ML/MIN/1.73M2
GLUCOSE SERPL-MCNC: 110 MG/DL (ref 70–108)
HBA1C MFR BLD HPLC: 5.8 % (ref 4.4–6.4)
HCT VFR BLD AUTO: 51.6 % (ref 42–52)
HDLC SERPL-MCNC: 41 MG/DL
HGB BLD-MCNC: 16.8 GM/DL (ref 14–18)
IMM GRANULOCYTES # BLD AUTO: 0.02 THOU/MM3 (ref 0–0.07)
IMM GRANULOCYTES NFR BLD AUTO: 0.3 %
LDLC SERPL CALC-MCNC: 74 MG/DL
LYMPHOCYTES ABSOLUTE: 1.5 THOU/MM3 (ref 1–4.8)
LYMPHOCYTES NFR BLD AUTO: 24.2 %
MCH RBC QN AUTO: 31.9 PG (ref 26–33)
MCHC RBC AUTO-ENTMCNC: 32.6 GM/DL (ref 32.2–35.5)
MCV RBC AUTO: 97.9 FL (ref 80–94)
MONOCYTES ABSOLUTE: 0.6 THOU/MM3 (ref 0.4–1.3)
MONOCYTES NFR BLD AUTO: 9.7 %
NEUTROPHILS NFR BLD AUTO: 62.5 %
NRBC BLD AUTO-RTO: 0 /100 WBC
PLATELET # BLD AUTO: 188 THOU/MM3 (ref 130–400)
PMV BLD AUTO: 11.1 FL (ref 9.4–12.4)
POTASSIUM SERPL-SCNC: 4.1 MEQ/L (ref 3.5–5.2)
PROT SERPL-MCNC: 7.2 G/DL (ref 6.1–8)
RBC # BLD AUTO: 5.27 MILL/MM3 (ref 4.7–6.1)
SEGMENTED NEUTROPHILS ABSOLUTE COUNT: 3.9 THOU/MM3 (ref 1.8–7.7)
SODIUM SERPL-SCNC: 143 MEQ/L (ref 135–145)
TRIGL SERPL-MCNC: 137 MG/DL (ref 0–199)
TSH SERPL DL<=0.005 MIU/L-ACNC: 2.73 UIU/ML (ref 0.4–4.2)
URATE SERPL-MCNC: 5 MG/DL (ref 3.7–7)
WBC # BLD AUTO: 6.2 THOU/MM3 (ref 4.8–10.8)

## 2024-03-07 PROCEDURE — 36415 COLL VENOUS BLD VENIPUNCTURE: CPT

## 2024-03-07 PROCEDURE — 84550 ASSAY OF BLOOD/URIC ACID: CPT

## 2024-03-07 PROCEDURE — 85025 COMPLETE CBC W/AUTO DIFF WBC: CPT

## 2024-03-07 PROCEDURE — 80061 LIPID PANEL: CPT

## 2024-03-07 PROCEDURE — 83036 HEMOGLOBIN GLYCOSYLATED A1C: CPT

## 2024-03-07 PROCEDURE — 84443 ASSAY THYROID STIM HORMONE: CPT

## 2024-03-07 PROCEDURE — 80053 COMPREHEN METABOLIC PANEL: CPT

## 2024-12-30 RX ORDER — IRBESARTAN AND HYDROCHLOROTHIAZIDE 150; 12.5 MG/1; MG/1
TABLET, FILM COATED ORAL
Qty: 90 TABLET | Refills: 0 | Status: SHIPPED | OUTPATIENT
Start: 2024-12-30

## 2024-12-30 RX ORDER — ALLOPURINOL 300 MG/1
TABLET ORAL
Qty: 90 TABLET | Refills: 0 | Status: SHIPPED | OUTPATIENT
Start: 2024-12-30

## 2024-12-30 RX ORDER — METOPROLOL SUCCINATE 25 MG/1
TABLET, EXTENDED RELEASE ORAL
Qty: 90 TABLET | Refills: 0 | Status: SHIPPED | OUTPATIENT
Start: 2024-12-30

## 2025-03-28 RX ORDER — ALLOPURINOL 300 MG/1
300 TABLET ORAL DAILY
Qty: 90 TABLET | Refills: 3 | Status: SHIPPED | OUTPATIENT
Start: 2025-03-28

## 2025-03-28 RX ORDER — METOPROLOL SUCCINATE 25 MG/1
25 TABLET, EXTENDED RELEASE ORAL DAILY
Qty: 90 TABLET | Refills: 3 | Status: SHIPPED | OUTPATIENT
Start: 2025-03-28

## 2025-03-28 RX ORDER — IRBESARTAN AND HYDROCHLOROTHIAZIDE 150; 12.5 MG/1; MG/1
1 TABLET, FILM COATED ORAL DAILY
Qty: 90 TABLET | Refills: 3 | Status: SHIPPED | OUTPATIENT
Start: 2025-03-28

## 2025-04-09 ENCOUNTER — OFFICE VISIT (OUTPATIENT)
Dept: FAMILY MEDICINE CLINIC | Age: 75
End: 2025-04-09
Payer: MEDICARE

## 2025-04-09 VITALS
DIASTOLIC BLOOD PRESSURE: 76 MMHG | WEIGHT: 237.7 LBS | HEIGHT: 71 IN | BODY MASS INDEX: 33.28 KG/M2 | SYSTOLIC BLOOD PRESSURE: 120 MMHG | RESPIRATION RATE: 16 BRPM | HEART RATE: 72 BPM

## 2025-04-09 DIAGNOSIS — R73.01 IFG (IMPAIRED FASTING GLUCOSE): ICD-10-CM

## 2025-04-09 DIAGNOSIS — E66.9 OBESITY WITH BODY MASS INDEX GREATER THAN 30: ICD-10-CM

## 2025-04-09 DIAGNOSIS — E78.00 PURE HYPERCHOLESTEROLEMIA: ICD-10-CM

## 2025-04-09 DIAGNOSIS — Z00.00 MEDICARE ANNUAL WELLNESS VISIT, SUBSEQUENT: Primary | ICD-10-CM

## 2025-04-09 DIAGNOSIS — M10.00 IDIOPATHIC GOUT, UNSPECIFIED CHRONICITY, UNSPECIFIED SITE: ICD-10-CM

## 2025-04-09 DIAGNOSIS — I10 HYPERTENSION, UNSPECIFIED TYPE: ICD-10-CM

## 2025-04-09 PROCEDURE — G0439 PPPS, SUBSEQ VISIT: HCPCS | Performed by: FAMILY MEDICINE

## 2025-04-09 PROCEDURE — 1123F ACP DISCUSS/DSCN MKR DOCD: CPT | Performed by: FAMILY MEDICINE

## 2025-04-09 PROCEDURE — 1159F MED LIST DOCD IN RCRD: CPT | Performed by: FAMILY MEDICINE

## 2025-04-09 PROCEDURE — 3078F DIAST BP <80 MM HG: CPT | Performed by: FAMILY MEDICINE

## 2025-04-09 PROCEDURE — 3074F SYST BP LT 130 MM HG: CPT | Performed by: FAMILY MEDICINE

## 2025-04-09 RX ORDER — ALLOPURINOL 300 MG/1
300 TABLET ORAL DAILY
Qty: 90 TABLET | Refills: 3 | Status: SHIPPED | OUTPATIENT
Start: 2025-04-09

## 2025-04-09 RX ORDER — IRBESARTAN AND HYDROCHLOROTHIAZIDE 150; 12.5 MG/1; MG/1
1 TABLET, FILM COATED ORAL DAILY
Qty: 90 TABLET | Refills: 3 | Status: SHIPPED | OUTPATIENT
Start: 2025-04-09

## 2025-04-09 RX ORDER — METOPROLOL SUCCINATE 25 MG/1
25 TABLET, EXTENDED RELEASE ORAL DAILY
Qty: 90 TABLET | Refills: 3 | Status: SHIPPED | OUTPATIENT
Start: 2025-04-09

## 2025-04-09 SDOH — ECONOMIC STABILITY: FOOD INSECURITY: WITHIN THE PAST 12 MONTHS, THE FOOD YOU BOUGHT JUST DIDN'T LAST AND YOU DIDN'T HAVE MONEY TO GET MORE.: NEVER TRUE

## 2025-04-09 SDOH — ECONOMIC STABILITY: FOOD INSECURITY: WITHIN THE PAST 12 MONTHS, YOU WORRIED THAT YOUR FOOD WOULD RUN OUT BEFORE YOU GOT MONEY TO BUY MORE.: NEVER TRUE

## 2025-04-09 ASSESSMENT — PATIENT HEALTH QUESTIONNAIRE - PHQ9
SUM OF ALL RESPONSES TO PHQ QUESTIONS 1-9: 0
2. FEELING DOWN, DEPRESSED OR HOPELESS: NOT AT ALL
1. LITTLE INTEREST OR PLEASURE IN DOING THINGS: NOT AT ALL
SUM OF ALL RESPONSES TO PHQ QUESTIONS 1-9: 0

## 2025-04-09 ASSESSMENT — ENCOUNTER SYMPTOMS
RESPIRATORY NEGATIVE: 1
GASTROINTESTINAL NEGATIVE: 1

## 2025-04-09 NOTE — PROGRESS NOTES
per tablet Take 1 tablet by mouth daily Yes Deepak Zhu DO   metoprolol succinate (TOPROL XL) 25 MG extended release tablet Take 1 tablet by mouth daily Yes Deepak Zhu DO       CareTeam (Including outside providers/suppliers regularly involved in providing care):   Patient Care Team:  Deepak Zhu DO as PCP - General (Family Medicine)  [facility]  Jarret Deutsch MD as Orthopedic Surgeon (Orthopedic Surgery)  Cipriano Etienne MD as Surgeon (General Surgery)     Recommendations for Preventive Services Due: see orders and patient instructions/AVS.  Recommended screening schedule for the next 5-10 years is provided to the patient in written form: see Patient Instructions/AVS.     Reviewed and updated this visit:  Tobacco  Allergies  Meds  Problems  Sexual Hx

## 2025-08-08 ENCOUNTER — OFFICE VISIT (OUTPATIENT)
Dept: FAMILY MEDICINE CLINIC | Age: 75
End: 2025-08-08
Payer: MEDICARE

## 2025-08-08 VITALS
HEART RATE: 104 BPM | WEIGHT: 243.8 LBS | DIASTOLIC BLOOD PRESSURE: 88 MMHG | RESPIRATION RATE: 16 BRPM | BODY MASS INDEX: 34.49 KG/M2 | SYSTOLIC BLOOD PRESSURE: 164 MMHG

## 2025-08-08 DIAGNOSIS — I10 HYPERTENSION, UNSPECIFIED TYPE: Primary | ICD-10-CM

## 2025-08-08 DIAGNOSIS — R00.0 TACHYCARDIA: ICD-10-CM

## 2025-08-08 DIAGNOSIS — E66.811 OBESITY (BMI 30.0-34.9): ICD-10-CM

## 2025-08-08 PROCEDURE — 3077F SYST BP >= 140 MM HG: CPT | Performed by: FAMILY MEDICINE

## 2025-08-08 PROCEDURE — 99214 OFFICE O/P EST MOD 30 MIN: CPT | Performed by: FAMILY MEDICINE

## 2025-08-08 PROCEDURE — 1123F ACP DISCUSS/DSCN MKR DOCD: CPT | Performed by: FAMILY MEDICINE

## 2025-08-08 PROCEDURE — 1159F MED LIST DOCD IN RCRD: CPT | Performed by: FAMILY MEDICINE

## 2025-08-08 PROCEDURE — 3079F DIAST BP 80-89 MM HG: CPT | Performed by: FAMILY MEDICINE

## 2025-08-08 RX ORDER — METOPROLOL SUCCINATE 25 MG/1
50 TABLET, EXTENDED RELEASE ORAL DAILY
Qty: 90 TABLET | Refills: 3
Start: 2025-08-08

## 2025-08-08 ASSESSMENT — ENCOUNTER SYMPTOMS
GASTROINTESTINAL NEGATIVE: 1
RESPIRATORY NEGATIVE: 1

## 2025-08-26 ENCOUNTER — OFFICE VISIT (OUTPATIENT)
Dept: FAMILY MEDICINE CLINIC | Age: 75
End: 2025-08-26
Payer: MEDICARE

## 2025-08-26 VITALS
SYSTOLIC BLOOD PRESSURE: 132 MMHG | HEART RATE: 72 BPM | BODY MASS INDEX: 33.26 KG/M2 | DIASTOLIC BLOOD PRESSURE: 76 MMHG | WEIGHT: 235.1 LBS | RESPIRATION RATE: 16 BRPM

## 2025-08-26 DIAGNOSIS — I10 HYPERTENSION, UNSPECIFIED TYPE: Primary | ICD-10-CM

## 2025-08-26 PROCEDURE — 1159F MED LIST DOCD IN RCRD: CPT | Performed by: FAMILY MEDICINE

## 2025-08-26 PROCEDURE — 99213 OFFICE O/P EST LOW 20 MIN: CPT | Performed by: FAMILY MEDICINE

## 2025-08-26 PROCEDURE — 3078F DIAST BP <80 MM HG: CPT | Performed by: FAMILY MEDICINE

## 2025-08-26 PROCEDURE — 1123F ACP DISCUSS/DSCN MKR DOCD: CPT | Performed by: FAMILY MEDICINE

## 2025-08-26 PROCEDURE — 3075F SYST BP GE 130 - 139MM HG: CPT | Performed by: FAMILY MEDICINE

## 2025-08-26 RX ORDER — METOPROLOL SUCCINATE 50 MG/1
50 TABLET, EXTENDED RELEASE ORAL DAILY
Qty: 90 TABLET | Refills: 3 | Status: SHIPPED | OUTPATIENT
Start: 2025-08-26

## 2025-08-26 SDOH — ECONOMIC STABILITY: FOOD INSECURITY: WITHIN THE PAST 12 MONTHS, THE FOOD YOU BOUGHT JUST DIDN'T LAST AND YOU DIDN'T HAVE MONEY TO GET MORE.: NEVER TRUE

## 2025-08-26 SDOH — ECONOMIC STABILITY: FOOD INSECURITY: WITHIN THE PAST 12 MONTHS, YOU WORRIED THAT YOUR FOOD WOULD RUN OUT BEFORE YOU GOT MONEY TO BUY MORE.: NEVER TRUE

## 2025-08-26 ASSESSMENT — PATIENT HEALTH QUESTIONNAIRE - PHQ9
1. LITTLE INTEREST OR PLEASURE IN DOING THINGS: NOT AT ALL
SUM OF ALL RESPONSES TO PHQ QUESTIONS 1-9: 0
2. FEELING DOWN, DEPRESSED OR HOPELESS: NOT AT ALL

## 2025-08-26 ASSESSMENT — ENCOUNTER SYMPTOMS
GASTROINTESTINAL NEGATIVE: 1
RESPIRATORY NEGATIVE: 1

## (undated) DEVICE — SET CATH 20GA L1.5IN RAD ART POLYUR RADPQ W/ INTEGR 0.018IN

## (undated) DEVICE — AEGIS 1" DISK 4MM HOLE, PEEL OPEN: Brand: MEDLINE

## (undated) DEVICE — SUTURE SILK 2-0 CP-1 30IN N ABSRB BRAID BLK 443H

## (undated) DEVICE — SPLINT ARMBRD W3XL10.5IN POLYFOAM DLX A LN

## (undated) DEVICE — BASIC SINGLE BASIN BTC-LF: Brand: MEDLINE INDUSTRIES, INC.

## (undated) DEVICE — Device

## (undated) DEVICE — DRAIN,WOUND,15FR,3/16,FULL-FLUTED: Brand: MEDLINE

## (undated) DEVICE — TROCAR: Brand: KII SHIELDED BLADED ACCESS SYSTEM

## (undated) DEVICE — AGENT HEMSTAT W4XL8IN OXIDIZED REGENERATED CELOS ABSRB

## (undated) DEVICE — GENERAL LAPAROSCOPY PACK-LF: Brand: MEDLINE INDUSTRIES, INC.

## (undated) DEVICE — ELECTROSURGICAL PENCIL BUTTON SWITCH E-Z CLEAN COATED BLADE ELECTRODE 10 FT (3 M) CORD HOLSTER: Brand: MEGADYNE

## (undated) DEVICE — SOLUTION IV 500ML 0.9% SOD CHL PH 5 INJ USP VIAFLX PLAS

## (undated) DEVICE — SOLUTION IV 1000ML LAC RINGERS PH 6.5 INJ USP VIAFLX PLAS

## (undated) DEVICE — 500ML,PRESSURE INFUSER W/STOPCOCK: Brand: MEDLINE

## (undated) DEVICE — BAG SPEC REM 224ML W4XL6IN DIA10MM 1 HND GYN DISP ENDOPCH

## (undated) DEVICE — YANKAUER,BULB TIP,W/O VENT,RIGID,STERILE: Brand: MEDLINE

## (undated) DEVICE — UNIVERSAL MONOPOLAR LAPAROSCOPIC CABLE 10FT, 4MM PIN CONNECTOR: Brand: CONMED

## (undated) DEVICE — BANDAGE ADH W1XL3IN NAT FAB WVN FLX DURABLE N ADH PD SEAL

## (undated) DEVICE — PRESSURE MONITORING SET: Brand: TRUWAVE

## (undated) DEVICE — 3M™ TRANSPORE™ WHITE SURGICAL TAPE 1534-2, 2 INCH X 10 YARD (5CM X 9,1M), 6 ROLLS/CARTON 10 CARTONS/CASE: Brand: 3M™ TRANSPORE™

## (undated) DEVICE — WARMER SCP 2 ANTIFOG LAP DISP

## (undated) DEVICE — GAUZE,SPONGE,3"X3",12PLY,STERILE,LF,2'S: Brand: MEDLINE

## (undated) DEVICE — APPLIER LIG CLP M L11IN TI STR RNG HNDL FOR 20 CLP DISP

## (undated) DEVICE — TUBING, SUCTION, 1/4" X 20', STRAIGHT: Brand: MEDLINE INDUSTRIES, INC.

## (undated) DEVICE — 450 ML BOTTLE OF 0.05% CHLORHEXIDINE GLUCONATE IN 99.95% STERILE WATER FOR IRRIGATION, USP AND APPLICATOR.: Brand: IRRISEPT ANTIMICROBIAL WOUND LAVAGE

## (undated) DEVICE — TROCAR: Brand: KII® SLEEVE

## (undated) DEVICE — Z CONVERTED USE 2715898 SWABSTICK MEDICATED W1.75XL6.5IN 1.6ML 3.15% CHG 70% ISO

## (undated) DEVICE — Z DUPLICATE USE 2431315 SET INSUF TBNG HI FLO W/ SMK EVAC FOR PNEUMOCLEAR

## (undated) DEVICE — LINER SUCT CANSTR 1500CC SEMI RIG W/ POR HYDROPHOBIC SHUT

## (undated) DEVICE — TROCAR: Brand: KII FIOS FIRST ENTRY

## (undated) DEVICE — PUMP SUC IRR TBNG L10FT W/ HNDPC ASSEMB STRYKEFLOW 2

## (undated) DEVICE — INSUFFLATION NEEDLE TO ESTABLISH PNEUMOPERITONEUM.: Brand: INSUFFLATION NEEDLE

## (undated) DEVICE — EVACUATOR SURG 100CC SIL BLB SUCT RESVR FOR CLS WND DRNGE

## (undated) DEVICE — E-Z CLEAN, NON-STICK, PTFE COATED, ELECTROSURGICAL BLADE ELECTRODE, 6.5 INCH (16.5 CM): Brand: MEGADYNE

## (undated) DEVICE — APPLIER CLP M L L11.4IN DIA10MM ENDOSCP ROT MULT FOR LIG

## (undated) DEVICE — SPONGE GZ W4XL4IN COT 12 PLY TYP VII WVN C FLD DSGN

## (undated) DEVICE — KIT,ANTI FOG,W/SPONGE & FLUID,SOFT PACK: Brand: MEDLINE

## (undated) DEVICE — SUTURE PERMA-HAND SZ 3-0 L30IN NONABSORBABLE BLK L60MM KS 622H